# Patient Record
Sex: FEMALE | Race: WHITE | NOT HISPANIC OR LATINO | Employment: OTHER | URBAN - METROPOLITAN AREA
[De-identification: names, ages, dates, MRNs, and addresses within clinical notes are randomized per-mention and may not be internally consistent; named-entity substitution may affect disease eponyms.]

---

## 2017-02-16 ENCOUNTER — OFFICE VISIT (OUTPATIENT)
Dept: FAMILY MEDICINE | Facility: CLINIC | Age: 32
End: 2017-02-16
Payer: COMMERCIAL

## 2017-02-16 VITALS
WEIGHT: 135 LBS | SYSTOLIC BLOOD PRESSURE: 129 MMHG | BODY MASS INDEX: 22.47 KG/M2 | DIASTOLIC BLOOD PRESSURE: 89 MMHG | HEART RATE: 103 BPM | TEMPERATURE: 97.6 F | OXYGEN SATURATION: 98 %

## 2017-02-16 DIAGNOSIS — R07.0 THROAT PAIN: ICD-10-CM

## 2017-02-16 DIAGNOSIS — J01.90 ACUTE SINUSITIS WITH SYMPTOMS > 10 DAYS: Primary | ICD-10-CM

## 2017-02-16 LAB
DEPRECATED S PYO AG THROAT QL EIA: NORMAL
MICRO REPORT STATUS: NORMAL
SPECIMEN SOURCE: NORMAL

## 2017-02-16 PROCEDURE — 87880 STREP A ASSAY W/OPTIC: CPT | Performed by: PHYSICIAN ASSISTANT

## 2017-02-16 PROCEDURE — 87081 CULTURE SCREEN ONLY: CPT | Performed by: PHYSICIAN ASSISTANT

## 2017-02-16 PROCEDURE — 99213 OFFICE O/P EST LOW 20 MIN: CPT | Performed by: PHYSICIAN ASSISTANT

## 2017-02-16 RX ORDER — DOXYCYCLINE 100 MG/1
100 CAPSULE ORAL 2 TIMES DAILY
Qty: 20 CAPSULE | Refills: 0 | Status: SHIPPED | OUTPATIENT
Start: 2017-02-16 | End: 2017-02-26

## 2017-02-16 NOTE — MR AVS SNAPSHOT
After Visit Summary   2/16/2017    Ely Lott    MRN: [id number]           Patient Information     Date Of Birth          1985        Visit Information        Provider Department      2/16/2017 2:20 PM Josiane Mitchell PA-C [facility]        Today's Diagnoses     Acute sinusitis with symptoms > 10 days    -  1    Throat pain          Care Instructions    Doxycycline 100 mg twice a day for 10 days  Increase fluids  Nasal wash  Mucinex DM  F/u if not better after the antibiotic course.     Sinusitis (Antibiotic Treatment)    The sinuses are air-filled spaces within the bones of the face. They connect to the inside of the nose. Sinusitis is an inflammation of the tissue lining the sinus cavity. Sinus inflammation can occur during a cold. It can also be due to allergies to pollens and other particles in the air. Sinusitis can cause symptoms of sinus congestion and fullness. A sinus infection causes fever, headache and facial pain. There is often green or yellow drainage from the nose or into the back of the throat (post-nasal drip). You have been given antibiotics to treat this condition.  Home care:    Take the full course of antibiotics as instructed. Do not stop taking them, even if you feel better.    Drink plenty of water, hot tea, and other liquids. This may help thin mucus. It also may promote sinus drainage.    Heat may help soothe painful areas of the face. Use a towel soaked in hot water. Or,  the shower and direct the hot spray onto your face. Using a vaporizer along with a menthol rub at night may also help.     An expectorant containing guaifenesin may help thin the mucus and promote drainage from the sinuses.    Over-the-counter decongestants may be used unless a similar medicine was prescribed. Nasal sprays work the fastest. Use one that contains phenylephrine or oxymetazoline. First blow the nose gently. Then use the spray. Do not use  these medicines more often than directed on the label or symptoms may get worse. You may also use tablets containing pseudoephedrine. Avoid products that combine ingredients, because side effects may be increased. Read labels. You can also ask the pharmacist for help. (NOTE: Persons with high blood pressure should not use decongestants. They can raise blood pressure.)    Over-the-counter antihistamines may help if allergies contributed to your sinusitis.      Do not use nasal rinses or irrigation during an acute sinus infection, unless told to by your health care provider. Rinsing may spread the infection to other sinuses.    Use acetaminophen or ibuprofen to control pain, unless another pain medicine was prescribed. (If you have chronic liver or kidney disease or ever had a stomach ulcer, talk with your doctor before using these medicines. Aspirin should never be used in anyone under 18 years of age who is ill with a fever. It may cause severe liver damage.)    Don't smoke. This can worsen symptoms.  Follow-up care  Follow up with your healthcare provider or our staff if you are not improving within the next week.  When to seek medical advice  Call your healthcare provider if any of these occur:    Facial pain or headache becoming more severe    Stiff neck    Unusual drowsiness or confusion    Swelling of the forehead or eyelids    Vision problems, including blurred or double vision    Fever of 100.4 F (38 C) or higher, or as directed by your healthcare provider    Seizure    Breathing problems    Symptoms not resolving within 10 days    7631-8228 [employer]. [address] [place] [address]. All rights reserved. This information is not intended as a substitute for professional medical care. Always follow your healthcare professional's instructions.              Follow-ups after your visit        Your next 10 appointments already scheduled     Feb 16, 2017  2:20 PM CST   Office Visit with  "Josiane Mitchell PA-C   [facility] ([facility])    [address] [facility] [address] [phone]           Bring a current list of meds and any records pertaining to this visit.  For Physicals, please bring immunization records and any forms needing to be filled out.  Please arrive 10 minutes early to complete paperwork.              Who to contact     If you have questions or need follow up information about today's clinic visit or your schedule please contact [facility] directly at [phone].  Normal or non-critical lab and imaging results will be communicated to you by MyChart, letter or phone within 4 business days after the clinic has received the results. If you do not hear from us within 7 days, please contact the clinic through [employer] or phone. If you have a critical or abnormal lab result, we will notify you by phone as soon as possible.  Submit refill requests through [email or web address] or call your pharmacy and they will forward the refill request to us. Please allow 3 business days for your refill to be completed.          Additional Information About Your Visit        [employer] Information     [email or web address] lets you send messages to your doctor, view your test results, renew your prescriptions, schedule appointments and more. To sign up, go to [email or web address] . Click on \"Log in\" on the left side of the screen, which will take you to the Welcome page. Then click on \"Sign up Now\" on the right side of the page.     You will be asked to enter the access code listed below, as well as some personal information. Please follow the directions to create your username and password.     Your access code is: [email or web address]  Expires: 2017  2:11 PM     Your access code will  in 90 days. If you need help or a new code, please call your [facility] or [phone].        Care EveryWhere ID     This is your Care " EveryWhere ID. This could be used by other organizations to access your [facility] medical records  [id number]        Your Vitals Were     Pulse Temperature Pulse Oximetry Breastfeeding? BMI (Body Mass Index)       103 97.6  F (36.4  C) (Oral) 98% No 22.47 kg/m2        Blood Pressure from Last 3 Encounters:   02/16/17 129/89   04/18/16 121/73   01/09/15 125/80    Weight from Last 3 Encounters:   02/16/17 135 lb (61.2 kg)   04/18/16 138 lb 12.8 oz (63 kg)   01/09/15 125 lb (56.7 kg)              We Performed the Following     Rapid strep screen          Today's Medication Changes          These changes are accurate as of: 2/16/17  2:11 PM.  If you have any questions, ask your nurse or doctor.               Start taking these medicines.        Dose/Directions    doxycycline Monohydrate 100 MG Caps   Used for:  Acute sinusitis with symptoms > 10 days   Started by:  Josiane Mitchell PA-C        Dose:  100 mg   Take 1 capsule (100 mg) by mouth 2 times daily for 10 days   Quantity:  20 capsule   Refills:  0            Where to get your medicines      These medications were sent to [facility]/pharmacy #[id number] - [place], [place] - [address]  [address] [address]     Phone:  [phone]     doxycycline Monohydrate 100 MG Caps                Primary Care Provider Office Phone # Fax #    Shazia Coleman [phone]       [address] [address]        Thank you!     Thank you for choosing [facility]  for your care. Our goal is always to provide you with excellent care. Hearing back from our patients is one way we can continue to improve our services. Please take a few minutes to complete the written survey that you may receive in the mail after your visit with us. Thank you!             Your Updated Medication List - Protect others around you: Learn how to safely use, store and throw away your  medicines at www.disposemymeds.org.          This list is accurate as of: 2/16/17  2:11 PM.  Always use your most recent med list.                   Brand Name Dispense Instructions for use    doxycycline Monohydrate 100 MG Caps     20 capsule    Take 1 capsule (100 mg) by mouth 2 times daily for 10 days       FLEXERIL PO      Take by mouth At Bedtime       ibuprofen 600 MG tablet    ADVIL/MOTRIN    100 tablet    Take 1 tablet by mouth every 8 hours as needed for pain.

## 2017-02-16 NOTE — PATIENT INSTRUCTIONS
Doxycycline 100 mg twice a day for 10 days  Increase fluids  Nasal wash  Mucinex DM  F/u if not better after the antibiotic course.     Sinusitis (Antibiotic Treatment)    The sinuses are air-filled spaces within the bones of the face. They connect to the inside of the nose. Sinusitis is an inflammation of the tissue lining the sinus cavity. Sinus inflammation can occur during a cold. It can also be due to allergies to pollens and other particles in the air. Sinusitis can cause symptoms of sinus congestion and fullness. A sinus infection causes fever, headache and facial pain. There is often green or yellow drainage from the nose or into the back of the throat (post-nasal drip). You have been given antibiotics to treat this condition.  Home care:    Take the full course of antibiotics as instructed. Do not stop taking them, even if you feel better.    Drink plenty of water, hot tea, and other liquids. This may help thin mucus. It also may promote sinus drainage.    Heat may help soothe painful areas of the face. Use a towel soaked in hot water. Or,  the shower and direct the hot spray onto your face. Using a vaporizer along with a menthol rub at night may also help.     An expectorant containing guaifenesin may help thin the mucus and promote drainage from the sinuses.    Over-the-counter decongestants may be used unless a similar medicine was prescribed. Nasal sprays work the fastest. Use one that contains phenylephrine or oxymetazoline. First blow the nose gently. Then use the spray. Do not use these medicines more often than directed on the label or symptoms may get worse. You may also use tablets containing pseudoephedrine. Avoid products that combine ingredients, because side effects may be increased. Read labels. You can also ask the pharmacist for help. (NOTE: Persons with high blood pressure should not use decongestants. They can raise blood pressure.)    Over-the-counter antihistamines may help if  allergies contributed to your sinusitis.      Do not use nasal rinses or irrigation during an acute sinus infection, unless told to by your health care provider. Rinsing may spread the infection to other sinuses.    Use acetaminophen or ibuprofen to control pain, unless another pain medicine was prescribed. (If you have chronic liver or kidney disease or ever had a stomach ulcer, talk with your doctor before using these medicines. Aspirin should never be used in anyone under 18 years of age who is ill with a fever. It may cause severe liver damage.)    Don't smoke. This can worsen symptoms.  Follow-up care  Follow up with your healthcare provider or our staff if you are not improving within the next week.  When to seek medical advice  Call your healthcare provider if any of these occur:    Facial pain or headache becoming more severe    Stiff neck    Unusual drowsiness or confusion    Swelling of the forehead or eyelids    Vision problems, including blurred or double vision    Fever of 100.4 F (38 C) or higher, or as directed by your healthcare provider    Seizure    Breathing problems    Symptoms not resolving within 10 days    2738-0185 [employer]. [address] [place] [address], [place] [address]. All rights reserved. This information is not intended as a substitute for professional medical care. Always follow your healthcare professional's instructions.

## 2017-02-16 NOTE — NURSING NOTE
"Chief Complaint   Patient presents with     URI     Sore throat       Initial /89 (BP Location: Right arm, Patient Position: Chair, Cuff Size: Adult Regular)  Pulse 103  Temp 97.6  F (36.4  C) (Oral)  Wt 135 lb (61.2 kg)  SpO2 98%  Breastfeeding? No  BMI 22.47 kg/m2 Estimated body mass index is 22.47 kg/(m^2) as calculated from the following:    Height as of 4/18/16: 5' 5\" (1.651 m).    Weight as of this encounter: 135 lb (61.2 kg).  Medication Reconciliation: complete   An,CMA      "

## 2017-02-18 LAB
BACTERIA SPEC CULT: NORMAL
MICRO REPORT STATUS: NORMAL
SPECIMEN SOURCE: NORMAL

## 2017-03-21 ENCOUNTER — OFFICE VISIT (OUTPATIENT)
Dept: FAMILY MEDICINE | Facility: CLINIC | Age: 32
End: 2017-03-21
Payer: COMMERCIAL

## 2017-03-21 VITALS
SYSTOLIC BLOOD PRESSURE: 126 MMHG | DIASTOLIC BLOOD PRESSURE: 76 MMHG | WEIGHT: 136 LBS | HEIGHT: 65 IN | TEMPERATURE: 98.1 F | BODY MASS INDEX: 22.66 KG/M2 | HEART RATE: 106 BPM

## 2017-03-21 DIAGNOSIS — Z32.01 PREGNANCY CONFIRMED BY POSITIVE URINE TEST: Primary | ICD-10-CM

## 2017-03-21 LAB — BETA HCG QUAL IFA URINE: POSITIVE

## 2017-03-21 PROCEDURE — 99213 OFFICE O/P EST LOW 20 MIN: CPT | Performed by: PHYSICIAN ASSISTANT

## 2017-03-21 PROCEDURE — 84703 CHORIONIC GONADOTROPIN ASSAY: CPT | Performed by: PHYSICIAN ASSISTANT

## 2017-03-21 ASSESSMENT — PAIN SCALES - GENERAL: PAINLEVEL: NO PAIN (0)

## 2017-03-21 NOTE — PATIENT INSTRUCTIONS
LMP 2/13/17  EGA 5 weeks 1 day  MYLES 11/21/17    Make appointment with GYN at around 8 weeks of gestation    Pregnancy    Your exam today shows that you are pregnant.  Pregnancy symptoms  During pregnancy your body s hormones change. This causes physical and emotional changes. This is normal. Knowing what to expect is important for your piece of mind and so you know when to seek help for a problem. Here are some of the most common symptoms:    Morning sickness or nausea. This can happen any time of the day or night.    Tender, swollen breasts    Need to urinate frequently    Tiredness or fatigue    Dizziness    Indigestion or heartburn    Food cravings or turn-offs    Constipation    Emotional changes. This can range from anxiety to excitement to depression.  Guidelines for a healthy pregnancy  Here are things you can do to help make sure your baby is born healthy:    Rest when you feel tired. This is especially true in the later months of pregnancy.    Drink more fluids. Your body needs more fluids than you may be used to. Drink 8 to10 glasses of juice, milk, or water every day.    Eat well-balanced meals. Eat at regular times to give your body enough protein. You can expect to gain about 30 pounds during the pregnancy. Don t try to diet or lose weight while you are pregnant.    Take 1 prenatal vitamin every day. This helps give you meet the extra nutritional needs of pregnancy.    Don t take any other medicine during your pregnancy unless your doctor tells you to. This includes prescription medicines and those you buy over the counter. Many drugs can harm the growing baby.    If you have nausea or vomiting, don t eat greasy or fried foods. Eat several smaller meals throughout the day rather than 3 large meals.    If you smoke, you must stop. The nicotine you breathe in goes right to the baby.    Stay away from alcohol, even in moderate amounts. Daily drinking will harm your baby and can cause permanent brain  damage.    Don t use recreational drugs, especially cocaine, crack, and heroin. These will harm your baby. Also avoid marijuana.    If you were using recreational drugs or prescribed medicine when you found out that you were pregnant, talk with your health care provider about possible effects on your growing baby.    If you have medical problems that you need to take medicine for, talk with your doctor about this.  Follow-up care  Call your health care provider to arrange for prenatal care. Prenatal care is important. You can see your family doctor, a pregnancy specialist (obstetrician), or a primary care clinic.  When to seek medical advice  Call your health care provider right away if any of these occur:    Vaginal bleeding    Pain in your belly (abdomen) or back that is moderate or severe    Lots of vomiting, or  you can t keep any fluids down for 6 hours    Burning feeling when you urinate    Headache, dizziness, or rapid weight gain    Fever    Vision changes or blurred vision       2332-5496 [employer]. [address] [place] [address]. All rights reserved. This information is not intended as a substitute for professional medical care. Always follow your healthcare professional's instructions.        Adapting to Pregnancy: First Trimester  As your body adjusts, you may have to change or limit your daily activities. You ll need more rest. You may also need to use the energy you have more wisely.     Eat stomach-friendly foods like cottage cheese, crackers, or bread throughout the day.   Your changing body  Almost every part of your body is affected as you adapt to pregnancy. The uterus and cervix will begin to soften right away. You may not look very pregnant during the first 3 months. But you are likely to have some common signs of early pregnancy:    Nausea    Fatigue    Frequent urination    Mood swings    Bloating of the abdomen    Missed or light periods (first trimester  bleeding)    Nipple or breast tenderness, breast swelling  It s not too late to start good habits  What matters most is protecting your baby from this moment on. If you smoke, drink alcohol, or use drugs, now is the time to stop. If you need help, talk with your healthcare provider.    Smoking increases the risk of  stillbirth or having a low-birth-weight baby. If you smoke, quit now.    Alcohol and drugs have been linked with miscarriage, birth defects, intellectual disability, and low birth weight. Do not drink alcohol or take drugs.  Tips to relieve nausea  Although nausea can happen at any time of the day, it may be worse in the morning. To help prevent nausea:    Eat small, light meals at frequent intervals.    Get up slowly. Eat a few unsalted crackers before you get out of bed.    Avoid smells that bother you.    Avoid spicy and fatty foods.    Eat an ice pop in your favorite flavor.    Get plenty of rest.    Ask your healthcare provider about taking tonya or vitamin B6 for nausea and vomiting.    Talk with your healthcare provider if you take vitamins that upset your stomach.  Work concerns  The end of the first trimester is a good time to discuss working during pregnancy with your employer. Follow your healthcare provider s advice if your job requires you to stand for a long time, work with hazardous tools, or even sit at a desk all day. Your workspace, workload, or scheduled hours may need to be adjusted. Perhaps you can change body postures more often or take an extra break.  Advice for travel  Talk to your healthcare provider first, but the second trimester may be the best time for any travel. You may be advised to avoid certain trips while you re pregnant. Food and water can be concerns in developing countries. Travel by car is a good choice, as you can stop, get out, and stretch. Bring snacks and water along. Fasten the lap belt below your belly, low over your hips. Also be sure to wear the shoulder  harness.  Intimacy  Unless your healthcare provider tells you to, there is no reason to stop having sex while you re pregnant. You or your partner may notice changes in desire. Desire may be less in the first trimester, due to nausea and fatigue. In the second trimester, sex may be very enjoyable. The third trimester can be a challenge comfort-wise. Try different positions and see what s best for you both.    8585-0828 [employer]. [address] [place] [address], [place] [address]. All rights reserved. This information is not intended as a substitute for professional medical care. Always follow your healthcare professional's instructions.        Adapting to Pregnancy: Second Trimester  Keep up the healthy habits you started in your first trimester. You might be a little more tired than normal. So plan your day wisely. Look at the tips below and choose the ones that suit your lifestyle.    If you have any questions, check with your healthcare provider.   If you work  If you can, adjust your work with your employer to fit your needs. Try these tips:    If you stand for long periods, find ways to do some tasks while sitting. Also, try to stand with 1 foot resting on a low stool or ledge. Shift your weight from foot to foot often. Wear low-heeled shoes.    If you sit, keep your knees level with your hips. Rest your feet on a firm surface. Sit tall with support for your low back.    If you work long hours, ask about adjusting your schedule. Try taking shorter breaks more often.  When you travel  The second trimester may be the best time for any travel. Talk to your healthcare provider about any special plans you may need to make. Always:    Wear a seat belt. Fasten the lap part under your belly. Wear the shoulder part also.    Take breaks often during long trips by car or plane. Move around to stretch your legs.    Drink plenty of fluids on flights. The air in plane cabins is very dry.    Avoid hot climates or high  altitudes if you are not used to them.    Avoid places where the food and water might make you sick.    Make sure you are up-to-date on all immunizations, including the flu vaccine. This is especially important when traveling overseas.  Taking time to relax  Find time to rest and relax at work or at home:    Take short time-outs daily. Do relaxation exercises.    Breathe deeply during stressful times.    Try not to take on too much. Plan tasks for times when you have the most energy.    Take naps when you can. Or just sit and relax.    After week 16, avoid lying on your back for more than a few minutes. Instead, lie on your side. Switch sides often.  Continuing as lovers  Unless your healthcare provider tells you otherwise, there is no reason to stop having sex now. Blood supply increases to the pelvic area in the second trimester. Because of this, sex might be more enjoyable. Try different positions and see what s best. Also, talk to your partner about any changes in desire. Spotting may happen after sex. Be sure to let your healthcare provider know if there is heavy bleeding.  Keeping your environment safe  You can still clean house and use scented products. Just take some simple precautions:    Wear gloves when using cleaning fluids.    Open windows to let in fresh air. Use a fan if you paint.    Avoid secondhand smoke.    Don t breathe fumes from nail polish, hair spray, cleansers, or other chemicals.    1723-3988 [employer]. [address] [place] [address], [place] [address]. All rights reserved. This information is not intended as a substitute for professional medical care. Always follow your healthcare professional's instructions.

## 2017-03-21 NOTE — PROGRESS NOTES
"  SUBJECTIVE:                                                    Ely Lott is a 31 year old female who presents to clinic today for the following health issues:    Confirmation of Pregnancy  -LMP : 2/13/17    Patient is feeling well. She has had intermittent cramping and feeling slightly bloated, but otherwise is doing well. No spotting or bleeding.     Problem list and histories reviewed & adjusted, as indicated.  Additional history: as documented    Patient Active Problem List   Diagnosis     Routine general medical examination at a health care facility     ASCUS favor benign     History reviewed. No pertinent past surgical history.    Social History   Substance Use Topics     Smoking status: Never Smoker     Smokeless tobacco: Never Used     Alcohol use Yes      Comment: Occasional     Family History   Problem Relation Age of Onset     DIABETES Father          Current Outpatient Prescriptions   Medication Sig Dispense Refill     Cyclobenzaprine HCl (FLEXERIL PO) Take by mouth At Bedtime       ibuprofen (ADVIL,MOTRIN) 600 MG tablet Take 1 tablet by mouth every 8 hours as needed for pain. (Patient not taking: Reported on 3/21/2017) 100 tablet 3     Allergies   Allergen Reactions     Penicillins        Reviewed and updated as needed this visit by clinical staff  Tobacco  Allergies  Meds  Med Hx  Surg Hx  Fam Hx  Soc Hx      Reviewed and updated as needed this visit by Provider         ROS:  Constitutional, HEENT, cardiovascular, pulmonary, gi and gu systems are negative, except as otherwise noted.    OBJECTIVE:                                                    /76 (BP Location: Left arm, Patient Position: Chair, Cuff Size: Adult Regular)  Pulse 106  Temp 98.1  F (36.7  C) (Oral)  Ht 5' 5\" (1.651 m)  Wt 136 lb (61.7 kg)  LMP 02/13/2017 (Exact Date)  Breastfeeding? No  BMI 22.63 kg/m2  Body mass index is 22.63 kg/(m^2).  GENERAL: healthy, alert and no distress  NECK: no adenopathy, no asymmetry, " masses, or scars and thyroid normal to palpation  RESP: lungs clear to auscultation - no rales, rhonchi or wheezes  CV: regular rate and rhythm, normal S1 S2, no S3 or S4, no murmur, click or rub, no peripheral edema and peripheral pulses strong  ABDOMEN: soft, nontender, no hepatosplenomegaly, no masses and bowel sounds normal  MS: no gross musculoskeletal defects noted, no edema    Diagnostic Test Results:  Results for orders placed or performed in visit on 03/21/17 (from the past 24 hour(s))   Beta HCG qual IFA urine   Result Value Ref Range    Beta HCG Qual IFA Urine Positive (A) NEG        ASSESSMENT/PLAN:                                                        ICD-10-CM    1. Amenorrhea N91.2 Beta HCG qual IFA urine   2. Pregnancy confirmed by positive urine test Z32.01 OB/GYN REFERRAL     LMP 2/13/17  EGA 5 weeks 1 day  MYLES 11/21/17    Make appointment with GYN at around 8 weeks of gestation  Josiane Mitchell PA-C  [facility]

## 2017-03-21 NOTE — MR AVS SNAPSHOT
After Visit Summary   3/21/2017    Ely Lott    MRN: [id number]           Patient Information     Date Of Birth          1985        Visit Information        Provider Department      3/21/2017 7:00 AM Josiane Mitchell PA-C [facility]        Today's Diagnoses     Amenorrhea    -  1    Pregnancy confirmed by positive urine test          Care Instructions    LMP 2/13/17  EGA 5 weeks 1 day  MYLES 11/21/17    Make appointment with GYN at around 8 weeks of gestation    Pregnancy    Your exam today shows that you are pregnant.  Pregnancy symptoms  During pregnancy your body s hormones change. This causes physical and emotional changes. This is normal. Knowing what to expect is important for your piece of mind and so you know when to seek help for a problem. Here are some of the most common symptoms:    Morning sickness or nausea. This can happen any time of the day or night.    Tender, swollen breasts    Need to urinate frequently    Tiredness or fatigue    Dizziness    Indigestion or heartburn    Food cravings or turn-offs    Constipation    Emotional changes. This can range from anxiety to excitement to depression.  Guidelines for a healthy pregnancy  Here are things you can do to help make sure your baby is born healthy:    Rest when you feel tired. This is especially true in the later months of pregnancy.    Drink more fluids. Your body needs more fluids than you may be used to. Drink 8 to10 glasses of juice, milk, or water every day.    Eat well-balanced meals. Eat at regular times to give your body enough protein. You can expect to gain about 30 pounds during the pregnancy. Don t try to diet or lose weight while you are pregnant.    Take 1 prenatal vitamin every day. This helps give you meet the extra nutritional needs of pregnancy.    Don t take any other medicine during your pregnancy unless your doctor tells you to. This includes prescription medicines and  those you buy over the counter. Many drugs can harm the growing baby.    If you have nausea or vomiting, don t eat greasy or fried foods. Eat several smaller meals throughout the day rather than 3 large meals.    If you smoke, you must stop. The nicotine you breathe in goes right to the baby.    Stay away from alcohol, even in moderate amounts. Daily drinking will harm your baby and can cause permanent brain damage.    Don t use recreational drugs, especially cocaine, crack, and heroin. These will harm your baby. Also avoid marijuana.    If you were using recreational drugs or prescribed medicine when you found out that you were pregnant, talk with your health care provider about possible effects on your growing baby.    If you have medical problems that you need to take medicine for, talk with your doctor about this.  Follow-up care  Call your health care provider to arrange for prenatal care. Prenatal care is important. You can see your family doctor, a pregnancy specialist (obstetrician), or a primary care clinic.  When to seek medical advice  Call your health care provider right away if any of these occur:    Vaginal bleeding    Pain in your belly (abdomen) or back that is moderate or severe    Lots of vomiting, or  you can t keep any fluids down for 6 hours    Burning feeling when you urinate    Headache, dizziness, or rapid weight gain    Fever    Vision changes or blurred vision       6129-4867 [employer]. [address] [place] [address], [place] [address]. All rights reserved. This information is not intended as a substitute for professional medical care. Always follow your healthcare professional's instructions.        Adapting to Pregnancy: First Trimester  As your body adjusts, you may have to change or limit your daily activities. You ll need more rest. You may also need to use the energy you have more wisely.     Eat stomach-friendly foods like cottage cheese, crackers, or bread throughout the  day.   Your changing body  Almost every part of your body is affected as you adapt to pregnancy. The uterus and cervix will begin to soften right away. You may not look very pregnant during the first 3 months. But you are likely to have some common signs of early pregnancy:    Nausea    Fatigue    Frequent urination    Mood swings    Bloating of the abdomen    Missed or light periods (first trimester bleeding)    Nipple or breast tenderness, breast swelling  It s not too late to start good habits  What matters most is protecting your baby from this moment on. If you smoke, drink alcohol, or use drugs, now is the time to stop. If you need help, talk with your healthcare provider.    Smoking increases the risk of  stillbirth or having a low-birth-weight baby. If you smoke, quit now.    Alcohol and drugs have been linked with miscarriage, birth defects, intellectual disability, and low birth weight. Do not drink alcohol or take drugs.  Tips to relieve nausea  Although nausea can happen at any time of the day, it may be worse in the morning. To help prevent nausea:    Eat small, light meals at frequent intervals.    Get up slowly. Eat a few unsalted crackers before you get out of bed.    Avoid smells that bother you.    Avoid spicy and fatty foods.    Eat an ice pop in your favorite flavor.    Get plenty of rest.    Ask your healthcare provider about taking tonya or vitamin B6 for nausea and vomiting.    Talk with your healthcare provider if you take vitamins that upset your stomach.  Work concerns  The end of the first trimester is a good time to discuss working during pregnancy with your employer. Follow your healthcare provider s advice if your job requires you to stand for a long time, work with hazardous tools, or even sit at a desk all day. Your workspace, workload, or scheduled hours may need to be adjusted. Perhaps you can change body postures more often or take an extra break.  Advice for travel  Talk to your  healthcare provider first, but the second trimester may be the best time for any travel. You may be advised to avoid certain trips while you re pregnant. Food and water can be concerns in developing countries. Travel by car is a good choice, as you can stop, get out, and stretch. Bring snacks and water along. Fasten the lap belt below your belly, low over your hips. Also be sure to wear the shoulder harness.  Intimacy  Unless your healthcare provider tells you to, there is no reason to stop having sex while you re pregnant. You or your partner may notice changes in desire. Desire may be less in the first trimester, due to nausea and fatigue. In the second trimester, sex may be very enjoyable. The third trimester can be a challenge comfort-wise. Try different positions and see what s best for you both.    2277-9697 [employer]. [address] [place] [address]. All rights reserved. This information is not intended as a substitute for professional medical care. Always follow your healthcare professional's instructions.        Adapting to Pregnancy: Second Trimester  Keep up the healthy habits you started in your first trimester. You might be a little more tired than normal. So plan your day wisely. Look at the tips below and choose the ones that suit your lifestyle.    If you have any questions, check with your healthcare provider.   If you work  If you can, adjust your work with your employer to fit your needs. Try these tips:    If you stand for long periods, find ways to do some tasks while sitting. Also, try to stand with 1 foot resting on a low stool or ledge. Shift your weight from foot to foot often. Wear low-heeled shoes.    If you sit, keep your knees level with your hips. Rest your feet on a firm surface. Sit tall with support for your low back.    If you work long hours, ask about adjusting your schedule. Try taking shorter breaks more often.  When you travel  The second trimester may  be the best time for any travel. Talk to your healthcare provider about any special plans you may need to make. Always:    Wear a seat belt. Fasten the lap part under your belly. Wear the shoulder part also.    Take breaks often during long trips by car or plane. Move around to stretch your legs.    Drink plenty of fluids on flights. The air in plane cabins is very dry.    Avoid hot climates or high altitudes if you are not used to them.    Avoid places where the food and water might make you sick.    Make sure you are up-to-date on all immunizations, including the flu vaccine. This is especially important when traveling overseas.  Taking time to relax  Find time to rest and relax at work or at home:    Take short time-outs daily. Do relaxation exercises.    Breathe deeply during stressful times.    Try not to take on too much. Plan tasks for times when you have the most energy.    Take naps when you can. Or just sit and relax.    After week 16, avoid lying on your back for more than a few minutes. Instead, lie on your side. Switch sides often.  Continuing as lovers  Unless your healthcare provider tells you otherwise, there is no reason to stop having sex now. Blood supply increases to the pelvic area in the second trimester. Because of this, sex might be more enjoyable. Try different positions and see what s best. Also, talk to your partner about any changes in desire. Spotting may happen after sex. Be sure to let your healthcare provider know if there is heavy bleeding.  Keeping your environment safe  You can still clean house and use scented products. Just take some simple precautions:    Wear gloves when using cleaning fluids.    Open windows to let in fresh air. Use a fan if you paint.    Avoid secondhand smoke.    Don t breathe fumes from nail polish, hair spray, cleansers, or other chemicals.    2884-7507 [employer]. [address] [place] [address], [place] [address]. All rights reserved. This  "information is not intended as a substitute for professional medical care. Always follow your healthcare professional's instructions.              Follow-ups after your visit        Additional Services     OB/GYN REFERRAL       Your provider has referred you to:  FMDARY: [facility] [phone]   [email or web address]    Please be aware that coverage of these services is subject to the terms and limitations of your health insurance plan.  Call member services at your health plan with any benefit or coverage questions.      Please bring the following with you to your appointment:    (1) Any X-Rays, CTs or MRIs which have been performed.  Contact the facility where they were done to arrange for  prior to your scheduled appointment.   (2) List of current medications   (3) This referral request   (4) Any documents/labs given to you for this referral                  Who to contact     If you have questions or need follow up information about today's clinic visit or your schedule please contact [facility] directly at [phone].  Normal or non-critical lab and imaging results will be communicated to you by MyChart, letter or phone within 4 business days after the clinic has received the results. If you do not hear from us within 7 days, please contact the clinic through [employer] or phone. If you have a critical or abnormal lab result, we will notify you by phone as soon as possible.  Submit refill requests through [employer] or call your pharmacy and they will forward the refill request to us. Please allow 3 business days for your refill to be completed.          Additional Information About Your Visit        [employer] [email or web address] Information     [employer] lets you send messages to your doctor, view your test results, renew your prescriptions, schedule appointments and more. To sign up, go to [email or web address] . Click on \"Log in\" on the left side " "of the screen, which will take you to the Welcome page. Then click on \"Sign up Now\" on the right side of the page.     You will be asked to enter the access code listed below, as well as some personal information. Please follow the directions to create your username and password.     Your access code is: [email or web address]  Expires: 2017  3:11 PM     Your access code will  in 90 days. If you need help or a new code, please call your [facility] or [phone].        Care EveryWhere ID     This is your Care EveryWhere ID. This could be used by other organizations to access your [place] medical records  [id number]        Your Vitals Were     Pulse Temperature Height Last Period Breastfeeding? BMI (Body Mass Index)    106 98.1  F (36.7  C) (Oral) 5' 5\" (1.651 m) 2017 (Exact Date) No 22.63 kg/m2       Blood Pressure from Last 3 Encounters:   17 126/76   17 129/89   16 121/73    Weight from Last 3 Encounters:   17 136 lb (61.7 kg)   17 135 lb (61.2 kg)   16 138 lb 12.8 oz (63 kg)              We Performed the Following     Beta HCG qual IFA urine     OB/GYN REFERRAL        Primary Care Provider Office Phone # Fax #    Shazia Coleman [phone]       [address] [address]        Thank you!     Thank you for choosing [facility]  for your care. Our goal is always to provide you with excellent care. Hearing back from our patients is one way we can continue to improve our services. Please take a few minutes to complete the written survey that you may receive in the mail after your visit with us. Thank you!             Your Updated Medication List - Protect others around you: Learn how to safely use, store and throw away your medicines at www.disposemymeds.org.          This list is accurate as of: 3/21/17  8:07 AM.  Always use your most recent med list.                   Brand " Name Dispense Instructions for use    FLEXERIL PO      Take by mouth At Bedtime       ibuprofen 600 MG tablet    ADVIL/MOTRIN    100 tablet    Take 1 tablet by mouth every 8 hours as needed for pain.

## 2017-03-21 NOTE — NURSING NOTE
"Chief Complaint   Patient presents with     Confirmation Of Pregnancy       Initial /76 (BP Location: Left arm, Patient Position: Chair, Cuff Size: Adult Regular)  Pulse 106  Temp 98.1  F (36.7  C) (Oral)  Ht 5' 5\" (1.651 m)  Wt 136 lb (61.7 kg)  LMP 02/13/2017 (Exact Date)  Breastfeeding? No  BMI 22.63 kg/m2 Estimated body mass index is 22.63 kg/(m^2) as calculated from the following:    Height as of this encounter: 5' 5\" (1.651 m).    Weight as of this encounter: 136 lb (61.7 kg).  Medication Reconciliation: complete     Josee Mae MA       "

## 2017-04-12 ENCOUNTER — PRENATAL OFFICE VISIT (OUTPATIENT)
Dept: OBGYN | Facility: CLINIC | Age: 32
End: 2017-04-12
Payer: COMMERCIAL

## 2017-04-12 VITALS
DIASTOLIC BLOOD PRESSURE: 81 MMHG | WEIGHT: 135 LBS | BODY MASS INDEX: 22.47 KG/M2 | HEART RATE: 83 BPM | TEMPERATURE: 97.8 F | SYSTOLIC BLOOD PRESSURE: 118 MMHG

## 2017-04-12 DIAGNOSIS — Z34.01 ENCOUNTER FOR SUPERVISION OF NORMAL FIRST PREGNANCY IN FIRST TRIMESTER: Primary | ICD-10-CM

## 2017-04-12 LAB
ERYTHROCYTE [DISTWIDTH] IN BLOOD BY AUTOMATED COUNT: 12.1 % (ref 10–15)
HCT VFR BLD AUTO: 39.9 % (ref 35–47)
HGB BLD-MCNC: 13.9 G/DL (ref 11.7–15.7)
MCH RBC QN AUTO: 31.7 PG (ref 26.5–33)
MCHC RBC AUTO-ENTMCNC: 34.8 G/DL (ref 31.5–36.5)
MCV RBC AUTO: 91 FL (ref 78–100)
PLATELET # BLD AUTO: 222 10E9/L (ref 150–450)
RBC # BLD AUTO: 4.38 10E12/L (ref 3.8–5.2)
WBC # BLD AUTO: 10.1 10E9/L (ref 4–11)

## 2017-04-12 PROCEDURE — 87086 URINE CULTURE/COLONY COUNT: CPT | Performed by: OBSTETRICS & GYNECOLOGY

## 2017-04-12 PROCEDURE — 85027 COMPLETE CBC AUTOMATED: CPT | Performed by: OBSTETRICS & GYNECOLOGY

## 2017-04-12 PROCEDURE — 86850 RBC ANTIBODY SCREEN: CPT | Performed by: OBSTETRICS & GYNECOLOGY

## 2017-04-12 PROCEDURE — 86900 BLOOD TYPING SEROLOGIC ABO: CPT | Performed by: OBSTETRICS & GYNECOLOGY

## 2017-04-12 PROCEDURE — 87389 HIV-1 AG W/HIV-1&-2 AB AG IA: CPT | Performed by: OBSTETRICS & GYNECOLOGY

## 2017-04-12 PROCEDURE — 86901 BLOOD TYPING SEROLOGIC RH(D): CPT | Performed by: OBSTETRICS & GYNECOLOGY

## 2017-04-12 PROCEDURE — 86780 TREPONEMA PALLIDUM: CPT | Performed by: OBSTETRICS & GYNECOLOGY

## 2017-04-12 PROCEDURE — 99207 ZZC FIRST OB VISIT: CPT | Performed by: OBSTETRICS & GYNECOLOGY

## 2017-04-12 PROCEDURE — 86762 RUBELLA ANTIBODY: CPT | Performed by: OBSTETRICS & GYNECOLOGY

## 2017-04-12 PROCEDURE — 87340 HEPATITIS B SURFACE AG IA: CPT | Performed by: OBSTETRICS & GYNECOLOGY

## 2017-04-12 PROCEDURE — 36415 COLL VENOUS BLD VENIPUNCTURE: CPT | Performed by: OBSTETRICS & GYNECOLOGY

## 2017-04-12 RX ORDER — PRENATAL VIT/IRON FUM/FOLIC AC 27MG-0.8MG
1 TABLET ORAL DAILY
COMMUNITY
End: 2023-03-29

## 2017-04-12 NOTE — MR AVS SNAPSHOT
After Visit Summary   4/12/2017    Ely Lott    MRN: [id number]           Patient Information     Date Of Birth          1985        Visit Information        Provider Department      4/12/2017 4:45 PM Kelvin Waddell MD [facility]        Care Instructions                                                        If you have any questions regarding your visit, Please contact your care team.     [employer] Access Services: [phone]    [facility] CLINIC HOURS TELEPHONE NUMBER   Kelvin Waddell M.D.      [place]-    Lakisha Gusman-MARIAN Wills-Medical Assistant   Monday-[place]  8:00a.m-4:45 p.m  Wednesday-[place] 8:00a.m-4:45 p.m.  Thursday-[place]  8:00a.m-4:45 p.m.  Friday-[place]  8:00a.m-4:45 p.m. [facility]  [address] [address] [place] [address] [place] [address]  [phone] ask [facility]  [phone] Fax  Imaging [phone]    [facility] Labor and Delivery  [address] [place] [address] Dr.  [place], [place] [address] [phone]    [facility]  [address] [place] [address]  [phone] [facility]  [phone] Fax  Imaging [phone]     Urgent Care locations:    [facility] Monday-Friday  5 pm - 9 pm  Saturday and Sunday   9 am - 5 pm    Monday-Friday   11 am - 9 pm  Saturday and Sunday   9 am - 5 pm   [phone]       If you need a medication refill, please contact your pharmacy. Please allow 3 business days for your refill to be completed.  As always, Thank you for trusting us with your healthcare needs!          Follow-ups after your visit        Who to contact     If you have questions or need follow up information about today's clinic visit or your schedule please contact [facility] directly at [phone].  Normal or non-critical lab and imaging results will be communicated to you by  "MyChart, letter or phone within 4 business days after the clinic has received the results. If you do not hear from us within 7 days, please contact the clinic through [employer] or phone. If you have a critical or abnormal lab result, we will notify you by phone as soon as possible.  Submit refill requests through [employer] or call your pharmacy and they will forward the refill request to us. Please allow 3 business days for your refill to be completed.          Additional Information About Your Visit        [employer] Information     [employer] lets you send messages to your doctor, view your test results, renew your prescriptions, schedule appointments and more. To sign up, go to [email or web address] . Click on \"Log in\" on the left side of the screen, which will take you to the Welcome page. Then click on \"Sign up Now\" on the right side of the page.     You will be asked to enter the access code listed below, as well as some personal information. Please follow the directions to create your username and password.     Your access code is: [email or web address]  Expires: 2017  3:11 PM     Your access code will  in 90 days. If you need help or a new code, please call your [place] clinic or [phone].        Care EveryWhere ID     This is your Care EveryWhere ID. This could be used by other organizations to access your [place] medical records  [id number]        Your Vitals Were     Pulse Temperature Last Period Breastfeeding? BMI (Body Mass Index)       83 97.8  F (36.6  C) (Oral) 2017 (Exact Date) No 22.47 kg/m2        Blood Pressure from Last 3 Encounters:   17 118/81   17 126/76   17 129/89    Weight from Last 3 Encounters:   17 61.2 kg (135 lb)   17 61.7 kg (136 lb)   17 61.2 kg (135 lb)              Today, you had the following     No orders found for display         Today's Medication Changes          These changes are accurate as of: 17  4:50 PM.  If you have any " questions, ask your nurse or doctor.               Stop taking these medicines if you haven't already. Please contact your care team if you have questions.     FLEXERIL PO   Stopped by:  Kelvin Waddell MD           ibuprofen 600 MG tablet   Commonly known as:  ADVIL/MOTRIN   Stopped by:  Kelvin Waddell MD                    Primary Care Provider Office Phone # Fax #    Shazia ENEDINA Coleman [phone]       [address] [address]        Thank you!     Thank you for choosing [facility]  for your care. Our goal is always to provide you with excellent care. Hearing back from our patients is one way we can continue to improve our services. Please take a few minutes to complete the written survey that you may receive in the mail after your visit with us. Thank you!             Your Updated Medication List - Protect others around you: Learn how to safely use, store and throw away your medicines at www.disposemymeds.org.          This list is accurate as of: 4/12/17  4:50 PM.  Always use your most recent med list.                   Brand Name Dispense Instructions for use    prenatal multivitamin  plus iron 27-0.8 MG Tabs per tablet      Take 1 tablet by mouth daily

## 2017-04-12 NOTE — PATIENT INSTRUCTIONS
If you have any questions regarding your visit, Please contact your care team.     [employer] [facility] Services: [phone]    [facility] CLINIC HOURS TELEPHONE NUMBER   DON [place]-    Lakisha Gusman-MARIAN Wills-Medical Assistant   Monday-[place]  8:00a.m-4:45 p.m  Wednesday-[place] 8:00a.m-4:45 p.m.  Thursday-[place]  8:00a.m-4:45 p.m.  Friday-[place]  8:00a.m-4:45 p.m. [facility]  [address]  [place], [place] [address] [phone] ask [facility]  [phone] Fax  Imaging [phone]    [facility] Labor and Delivery  [address] [place] [address] Dr.  [place], [place] [address] [phone]    [facility]  [address] [place] [address]  [phone] ask [facility]  [phone] Fax  Imaging [phone]     Urgent Care locations:    [place]        [place] Monday-Friday  5 pm - 9 pm  Saturday and Sunday   9 am - 5 pm    Monday-Friday   11 am - 9 pm  Saturday and Sunday   9 am - 5 pm   [phone]       If you need a medication refill, please contact your pharmacy. Please allow 3 business days for your refill to be completed.  As always, Thank you for trusting us with your healthcare needs!

## 2017-04-12 NOTE — NURSING NOTE
"Chief Complaint   Patient presents with     Prenatal Care     1st initial OB visit       Initial /81 (BP Location: Left arm, Patient Position: Chair, Cuff Size: Adult Regular)  Pulse 83  Temp 97.8  F (36.6  C) (Oral)  Wt 61.2 kg (135 lb)  LMP 02/13/2017 (Exact Date)  Breastfeeding? No  BMI 22.47 kg/m2 Estimated body mass index is 22.47 kg/(m^2) as calculated from the following:    Height as of 3/21/17: 1.651 m (5' 5\").    Weight as of this encounter: 61.2 kg (135 lb).  Medication Reconciliation: complete   Johann Beasley CMA      "

## 2017-04-13 LAB
ABO + RH BLD: NORMAL
ABO + RH BLD: NORMAL
BLD GP AB SCN SERPL QL: NORMAL
BLOOD BANK CMNT PATIENT-IMP: NORMAL
HBV SURFACE AG SERPL QL IA: NONREACTIVE
HIV 1+2 AB+HIV1 P24 AG SERPL QL IA: NORMAL
RUBV IGG SERPL IA-ACNC: 44 IU/ML
SPECIMEN EXP DATE BLD: NORMAL
T PALLIDUM IGG+IGM SER QL: NEGATIVE

## 2017-04-14 ENCOUNTER — RADIANT APPOINTMENT (OUTPATIENT)
Dept: ULTRASOUND IMAGING | Facility: CLINIC | Age: 32
End: 2017-04-14
Attending: OBSTETRICS & GYNECOLOGY
Payer: COMMERCIAL

## 2017-04-14 DIAGNOSIS — Z34.01 ENCOUNTER FOR SUPERVISION OF NORMAL FIRST PREGNANCY IN FIRST TRIMESTER: ICD-10-CM

## 2017-04-14 LAB
BACTERIA SPEC CULT: NORMAL
MICRO REPORT STATUS: NORMAL
SPECIMEN SOURCE: NORMAL

## 2017-04-14 PROCEDURE — 76801 OB US < 14 WKS SINGLE FETUS: CPT

## 2017-04-15 PROBLEM — Z34.00 SUPERVISION OF NORMAL FIRST PREGNANCY: Status: ACTIVE | Noted: 2017-04-15

## 2017-04-16 NOTE — PROGRESS NOTES
"\"Lizbeth\" Ely Lott is a 31 year old year old G 1 P 0 who presents for an initial obstetrical visit.  Referred by self.    Currently experiencing normal pregnancy symptoms without particular problems including pain, bleeding, marked vomiting or weight loss except: no exceptions.  This was a semi-planned pregnancy. Menses reg.   Here today with .  Additional concerns: poor sleep, chronic neck pain with chiropractic treatment.     ROS:     Systems reviewed include constitutional; breast;                 cardiac; respiratory; gastrointestinal; genitourinary;                                musculoskeletal; integumentary; psychological;                                hematologic/lymphatic and endocrine.                  These systems were negative for significant symptoms except                 for the following: none and see above HPI.    Past medical, obstetrical, surgical, family and social history reviewed and as noted or updated in chart.     Allergies, meds and supplements are as noted or updated in chart.                    Episode OB dating, demographic and history forms completed or reviewed.    EXAM:  VS as noted. BMI- Body mass index is 22.47 kg/(m^2).    Relatively recent normal general exam- not repeated now.       ASSESSMENT: (Z34.01) Encounter for supervision of normal first pregnancy in first trimester  (primary encounter diagnosis)  Comment:   Plan: CBC WITH PLATELETS, HIV Antigen Antibody Combo,        Rubella Antibody IgG Quantitative, Hepatitis B         surface antigen, Anti Treponema, ABO/RH TYPE         AND SCREEN, Urine Culture Aerobic Bacterial, US        OB < 14 Weeks Single               PLAN:  Anticipatory guidance as noted. Symptoms, problems and concerns reviewed. Recommended weight gain discussed. Problem list initiated. Check u/s now. Pap due in 2 yrs. Orders as noted. RTC in 4 weeks. Discuss special screening tests next.    Kelvin Waddell MD        "

## 2017-05-11 ENCOUNTER — PRENATAL OFFICE VISIT (OUTPATIENT)
Dept: OBGYN | Facility: CLINIC | Age: 32
End: 2017-05-11
Payer: COMMERCIAL

## 2017-05-11 VITALS
HEART RATE: 67 BPM | DIASTOLIC BLOOD PRESSURE: 78 MMHG | BODY MASS INDEX: 22.8 KG/M2 | WEIGHT: 137 LBS | SYSTOLIC BLOOD PRESSURE: 109 MMHG | TEMPERATURE: 96.7 F

## 2017-05-11 DIAGNOSIS — Z34.02 ENCOUNTER FOR SUPERVISION OF NORMAL FIRST PREGNANCY IN SECOND TRIMESTER: ICD-10-CM

## 2017-05-11 PROCEDURE — 99207 ZZC PRENATAL VISIT: CPT | Performed by: OBSTETRICS & GYNECOLOGY

## 2017-05-11 NOTE — PATIENT INSTRUCTIONS
If you have any questions regarding your visit, Please contact your care team.     [facility] Access [facility]: [phone]    [facility] CLINIC HOURS TELEPHONE NUMBER   DON [place]-    Lakisha Gusman-MARIAN Wills-Medical Assistant   Monday-[place]  8:00a.m-4:45 p.m  Wednesday-[place] 8:00a.m-4:45 p.m.  Thursday-[place]  8:00a.m-4:45 p.m.  Friday-[place]  8:00a.m-4:45 p.m. [facility]  [address]  [place], [place] [address] [phone] ask [facility]  [phone] Fax  Imaging [phone]    [facility] Labor and Delivery  [address] [place] [address] Dr.  [place], [place] [address] [phone]    [facility]  [address] [place] [address]  [phone] ask [facility]  [phone] Fax  Imaging [phone]     Urgent Care locations:    [place]        [place] Monday-Friday  5 pm - 9 pm  Saturday and Sunday   9 am - 5 pm    Monday-Friday   11 am - 9 pm  Saturday and Sunday   9 am - 5 pm   [phone]       If you need a medication refill, please contact your pharmacy. Please allow 3 business days for your refill to be completed.  As always, Thank you for trusting us with your healthcare needs!

## 2017-05-11 NOTE — PROGRESS NOTES
No signif signs, symptoms or concerns. Here with . Advice appropriate to gestational age reviewed including pertinent Checklist items. Discussed condition, tests and care plan including RBA. Problem list updated. Discuss special screening tests next.  A/P:  Ely was seen today for prenatal care.    Diagnoses and all orders for this visit:    Encounter for supervision of normal first pregnancy in second trimester        Kelvin Waddell MD

## 2017-05-11 NOTE — MR AVS SNAPSHOT
After Visit Summary   5/11/2017    Ely Lott    MRN: [id number]           Patient Information     Date Of Birth          1985        Visit Information        Provider Department      5/11/2017 4:15 PM Kelvin Waddell MD [facility]        Today's Diagnoses     Encounter for supervision of normal first pregnancy in second trimester          Care Instructions                                                        If you have any questions regarding your visit, Please contact your care team.     [facility] Access Services: [phone]    [facility] CLINIC HOURS TELEPHONE NUMBER   Kelvin Waddell M.D.      [place]-    Lakisha Gusman-MARIAN Wills-Medical Assistant   Monday-[place]  8:00a.m-4:45 p.m  Wednesday-[place] 8:00a.m-4:45 p.m.  Thursday-[place]  8:00a.m-4:45 p.m.  Friday-[place]  8:00a.m-4:45 p.m. [facility]  [address] [place] [address]. [place] [address]  [phone] ask for [facility]  [phone] Fax  Imaging [phone]    [facility] Labor and Delivery  [address]  [place], [place] [address] [phone]    [facility]  [address] [place] [address]  [phone] ask [facility]  [phone] Fax  Imaging [phone]     Urgent Care locations:    [facility] Monday-Friday  5 pm - 9 pm  Saturday and Sunday   9 am - 5 pm    Monday-Friday   11 am - 9 pm  Saturday and Sunday   9 am - 5 pm   [phone]       If you need a medication refill, please contact your pharmacy. Please allow 3 business days for your refill to be completed.  As always, Thank you for trusting us with your healthcare needs!          Follow-ups after your visit        Who to contact     If you have questions or need follow up information about today's clinic visit or your schedule please contact [facility] directly  "at [phone].  Normal or non-critical lab and imaging results will be communicated to you by MyChart, letter or phone within 4 business days after the clinic has received the results. If you do not hear from us within 7 days, please contact the clinic through Brandtonehart or phone. If you have a critical or abnormal lab result, we will notify you by phone as soon as possible.  Submit refill requests through PlayMob or call your pharmacy and they will forward the refill request to us. Please allow 3 business days for your refill to be completed.          Additional Information About Your Visit        Brandtonehart Information     PlayMob lets you send messages to your doctor, view your test results, renew your prescriptions, schedule appointments and more. To sign up, go to [email or web address] . Click on \"Log in\" on the left side of the screen, which will take you to the Welcome page. Then click on \"Sign up Now\" on the right side of the page.     You will be asked to enter the access code listed below, as well as some personal information. Please follow the directions to create your username and password.     Your access code is: [email or web address]  Expires: 2017  3:11 PM     Your access code will  in 90 days. If you need help or a new code, please call your [place] clinic or [phone].        Care EveryWhere ID     This is your Care EveryWhere ID. This could be used by other organizations to access your [place] medical records  [id number]        Your Vitals Were     Pulse Temperature Last Period Breastfeeding? BMI (Body Mass Index)       67 96.7  F (35.9  C) (Oral) 2017 (Exact Date) No 22.8 kg/m2        Blood Pressure from Last 3 Encounters:   17 109/78   17 118/81   17 126/76    Weight from Last 3 Encounters:   17 137 lb (62.1 kg)   17 135 lb (61.2 kg)   17 136 lb (61.7 kg)              Today, you had the following     No orders found for display       Primary Care " Provider Office Phone # Fax #    Shazia Coleman [phone]       [address] [address]        Thank you!     Thank you for choosing [facility]  for your care. Our goal is always to provide you with excellent care. Hearing back from our patients is one way we can continue to improve our services. Please take a few minutes to complete the written survey that you may receive in the mail after your visit with us. Thank you!             Your Updated Medication List - Protect others around you: Learn how to safely use, store and throw away your medicines at www.disposemymeds.org.          This list is accurate as of: 5/11/17  4:33 PM.  Always use your most recent med list.                   Brand Name Dispense Instructions for use    prenatal multivitamin  plus iron 27-0.8 MG Tabs per tablet      Take 1 tablet by mouth daily

## 2017-06-15 ENCOUNTER — PRENATAL OFFICE VISIT (OUTPATIENT)
Dept: OBGYN | Facility: CLINIC | Age: 32
End: 2017-06-15
Payer: COMMERCIAL

## 2017-06-15 VITALS
SYSTOLIC BLOOD PRESSURE: 113 MMHG | BODY MASS INDEX: 23.46 KG/M2 | WEIGHT: 141 LBS | TEMPERATURE: 97 F | HEART RATE: 80 BPM | DIASTOLIC BLOOD PRESSURE: 78 MMHG

## 2017-06-15 DIAGNOSIS — Z34.02 ENCOUNTER FOR SUPERVISION OF NORMAL FIRST PREGNANCY IN SECOND TRIMESTER: ICD-10-CM

## 2017-06-15 PROCEDURE — 36415 COLL VENOUS BLD VENIPUNCTURE: CPT | Performed by: OBSTETRICS & GYNECOLOGY

## 2017-06-15 PROCEDURE — 81511 FTL CGEN ABNOR FOUR ANAL: CPT | Mod: 90 | Performed by: OBSTETRICS & GYNECOLOGY

## 2017-06-15 PROCEDURE — 99000 SPECIMEN HANDLING OFFICE-LAB: CPT | Performed by: OBSTETRICS & GYNECOLOGY

## 2017-06-15 PROCEDURE — 99207 ZZC PRENATAL VISIT: CPT | Performed by: OBSTETRICS & GYNECOLOGY

## 2017-06-15 NOTE — MR AVS SNAPSHOT
After Visit Summary   6/15/2017    Ely Lott    MRN: [id number]           Patient Information     Date Of Birth          1985        Visit Information        Provider Department      6/15/2017 4:30 PM Kelvin Waddell MD [facility]        Today's Diagnoses     Encounter for supervision of normal first pregnancy in second trimester          Care Instructions                                                        If you have any questions regarding your visit, Please contact your care team.     [facility] Access Services: [phone]    [facility] CLINIC HOURS TELEPHONE NUMBER   Kelvin Waddell M.D.      [place]-    Lakisha Gusman-MARIAN Wills-Medical Assistant   Monday-[place]  8:00a.m-4:45 p.m  Wednesday-[place] 8:00a.m-4:45 p.m.  Thursday-[place]  8:00a.m-4:45 p.m.  Friday-[place]  8:00a.m-4:45 p.m. [facility]  [address] [place] [address]. [place] [address]  [phone] ask for [facility]  [phone] Fax  Imaging [phone]    [facility] Labor and Delivery  [address] [place] [address]  [phone]    [facility]  [address] [place] [address]  [phone] ask [facility]  [phone] Fax  Imaging [phone]     Urgent Care locations:    [facility] Monday-Friday  5 pm - 9 pm  Saturday and Sunday   9 am - 5 pm    Monday-Friday   11 am - 9 pm  Saturday and Sunday   9 am - 5 pm   [phone]       If you need a medication refill, please contact your pharmacy. Please allow 3 business days for your refill to be completed.  As always, Thank you for trusting us with your healthcare needs!            Follow-ups after your visit        Who to contact     If you have questions or need follow up information about today's clinic visit or your schedule please contact [facility]  "directly at [phone].  Normal or non-critical lab and imaging results will be communicated to you by Qualtricshart, letter or phone within 4 business days after the clinic has received the results. If you do not hear from us within 7 days, please contact the clinic through Qualtricshart or phone. If you have a critical or abnormal lab result, we will notify you by phone as soon as possible.  Submit refill requests through POS on CLOUD or call your pharmacy and they will forward the refill request to us. Please allow 3 business days for your refill to be completed.          Additional Information About Your Visit        QualtricsharSemasio Information     POS on CLOUD lets you send messages to your doctor, view your test results, renew your prescriptions, schedule appointments and more. To sign up, go to [email or web address] . Click on \"Log in\" on the left side of the screen, which will take you to the Welcome page. Then click on \"Sign up Now\" on the right side of the page.     You will be asked to enter the access code listed below, as well as some personal information. Please follow the directions to create your username and password.     Your access code is: [email or web address]  Expires: 2017  4:32 PM     Your access code will  in 90 days. If you need help or a new code, please call your [place] clinic or [phone].        Care EveryWhere ID     This is your Care EveryWhere ID. This could be used by other organizations to access your [place] medical records  [id number]        Your Vitals Were     Pulse Temperature Last Period Breastfeeding? BMI (Body Mass Index)       80 97  F (36.1  C) (Oral) 2017 (Exact Date) No 23.46 kg/m2        Blood Pressure from Last 3 Encounters:   06/15/17 113/78   17 109/78   17 118/81    Weight from Last 3 Encounters:   06/15/17 141 lb (64 kg)   17 137 lb (62.1 kg)   17 135 lb (61.2 kg)              Today, you had the following     No orders found for display       Primary " Care Provider Office Phone # Fax #    Shazia Coleman [phone]       [address] [address]        Thank you!     Thank you for choosing [facility]  for your care. Our goal is always to provide you with excellent care. Hearing back from our patients is one way we can continue to improve our services. Please take a few minutes to complete the written survey that you may receive in the mail after your visit with us. Thank you!             Your Updated Medication List - Protect others around you: Learn how to safely use, store and throw away your medicines at www.disposemymeds.org.          This list is accurate as of: 6/15/17  4:32 PM.  Always use your most recent med list.                   Brand Name Dispense Instructions for use    prenatal multivitamin  plus iron 27-0.8 MG Tabs per tablet      Take 1 tablet by mouth daily

## 2017-06-15 NOTE — NURSING NOTE
"Chief Complaint   Patient presents with     Prenatal Care     OBV 17w3d       Initial /78 (BP Location: Left arm, Patient Position: Chair, Cuff Size: Adult Regular)  Pulse 80  Temp 97  F (36.1  C) (Oral)  Wt 141 lb (64 kg)  LMP 02/13/2017 (Exact Date)  Breastfeeding? No  BMI 23.46 kg/m2 Estimated body mass index is 23.46 kg/(m^2) as calculated from the following:    Height as of 3/21/17: 5' 5\" (1.651 m).    Weight as of this encounter: 141 lb (64 kg).  Medication Reconciliation: complete   Johann Beasley CMA      "

## 2017-06-15 NOTE — PATIENT INSTRUCTIONS
If you have any questions regarding your visit, Please contact your care team.     [facility] Access Services: [phone]    [facility] CLINIC HOURS TELEPHONE NUMBER   DON [place]-    Lakisha Gusman-MARIAN Wills-Medical Assistant   Monday-[place]  8:00a.m-4:45 p.m  Wednesday-[place] 8:00a.m-4:45 p.m.  Thursday-[place]  8:00a.m-4:45 p.m.  Friday-[place]  8:00a.m-4:45 p.m. [facility]  [address]  [place], [place] [address] [phone] ask [facility]  [phone] Fax  Imaging [phone]    [facility] Labor and Delivery  [address] [place] [address] Dr.  [place], [place] [address] [phone]    [facility]  [address] [place] [address]  [phone] ask [facility]  [phone] Fax  Imaging [phone]     Urgent Care locations:    [place]        [place] Monday-Friday  5 pm - 9 pm  Saturday and Sunday   9 am - 5 pm    Monday-Friday   11 am - 9 pm  Saturday and Sunday   9 am - 5 pm   [phone]       If you need a medication refill, please contact your pharmacy. Please allow 3 business days for your refill to be completed.  As always, Thank you for trusting us with your healthcare needs!

## 2017-06-15 NOTE — LETTER
[address] [address]  [phone]      June 20, 2017      Ely Lott  [address] [place] [address]            Dear Ely,    Your Maternal Quad screen came back normal.  We will see you at your next appointment.      Sincerely,  Kelvin Waddell MD

## 2017-06-18 NOTE — PROGRESS NOTES
No signif signs, symptoms or concerns except transient nausea. Here with .   Discussed special diagnostic and screening tests and plans (y = yes, n = no/declined, u = uncertain/considering): quad scr = y, survey u/s = y, Level 2 survey u/s with MFM = n, CF carrier scr = n, hemoglobinopathy or thalassemia scr= n, SMA, fragile X  and other genetic scr= n, 1st trimester scr with NT and later AFP or with cell free fetal DNA and later AFP = n, cell free fetal DNA= n, genetic amnio/CVS = n.  Advice appropriate to gestational age reviewed including pertinent Checklist items. Discussed condition, tests and care plan including RBA. Problem list updated. Survey u/s next.  A/P:  Ely was seen today for prenatal care.    Diagnoses and all orders for this visit:    Encounter for supervision of normal first pregnancy in second trimester  -     Maternal quad screen  -     US OB > 14 Weeks Complete Single; Future        Kelvin Waddell MD

## 2017-06-19 LAB
# FETUSES US: NORMAL
AFP ADJ MOM AMN: 1.15
AFP SERPL-MCNC: 47 NG/ML
AGE - REPORTED: 31
DATING METHOD: NORMAL
DIABETIC AT CONCEPTION: NO
FAMILY MEMBER DISEASES HX: NO
FAMILY MEMBER DISEASES HX: NO
GA METHOD: NORMAL
GA: 17.43 WK
HCG MOM SERPL: 2.34
HCG SERPL-ACNC: NORMAL M[IU]/ML
HX OF HEREDITARY DISORDERS: NO
IDDM PATIENT QL: NO
INHIBIN A MOM SERPL: 1.52
INHIBIN A SERPL-MCNC: 247
INTEGRATED SCN PATIENT-IMP: NORMAL
LMP START DATE: NORMAL
PATHOLOGY STUDY: NORMAL
PREV HX CHROMOSOME ABNORMALITY: NO
SPECIMEN DRAWN SERPL: NORMAL
TWINS: NORMAL
U ESTRIOL MOM SERPL: 2.02
U ESTRIOL SERPL-MCNC: 2.65 NG/ML

## 2017-07-03 ENCOUNTER — RADIANT APPOINTMENT (OUTPATIENT)
Dept: ULTRASOUND IMAGING | Facility: CLINIC | Age: 32
End: 2017-07-03
Attending: OBSTETRICS & GYNECOLOGY
Payer: COMMERCIAL

## 2017-07-03 DIAGNOSIS — Z34.02 ENCOUNTER FOR SUPERVISION OF NORMAL FIRST PREGNANCY IN SECOND TRIMESTER: ICD-10-CM

## 2017-07-03 PROCEDURE — 76805 OB US >/= 14 WKS SNGL FETUS: CPT

## 2017-07-20 ENCOUNTER — PRENATAL OFFICE VISIT (OUTPATIENT)
Dept: OBGYN | Facility: CLINIC | Age: 32
End: 2017-07-20
Payer: COMMERCIAL

## 2017-07-20 VITALS
TEMPERATURE: 98.7 F | SYSTOLIC BLOOD PRESSURE: 118 MMHG | BODY MASS INDEX: 25.63 KG/M2 | DIASTOLIC BLOOD PRESSURE: 70 MMHG | WEIGHT: 154 LBS | HEART RATE: 81 BPM | OXYGEN SATURATION: 100 %

## 2017-07-20 DIAGNOSIS — Z34.02 ENCOUNTER FOR SUPERVISION OF NORMAL FIRST PREGNANCY IN SECOND TRIMESTER: ICD-10-CM

## 2017-07-20 PROCEDURE — 99207 ZZC PRENATAL VISIT: CPT | Performed by: OBSTETRICS & GYNECOLOGY

## 2017-07-20 NOTE — NURSING NOTE
"Chief Complaint   Patient presents with     Prenatal Care     OBV 22w3d       Initial /70  Pulse 81  Temp 98.7  F (37.1  C) (Oral)  Wt 154 lb (69.9 kg)  LMP 02/13/2017 (Exact Date)  SpO2 100%  Breastfeeding? No  BMI 25.63 kg/m2 Estimated body mass index is 25.63 kg/(m^2) as calculated from the following:    Height as of 3/21/17: 5' 5\" (1.651 m).    Weight as of this encounter: 154 lb (69.9 kg).  Medication Reconciliation: complete   Johann Beasley CMA      "

## 2017-07-20 NOTE — PROGRESS NOTES
No signif signs, symptoms or concerns except weight gain noted. Advice appropriate to gestational age reviewed including pertinent Checklist items. Discussed condition, tests and care plan including RBA. Problem list updated. 1h GTT next.  A/P:  Ely was seen today for prenatal care.    Diagnoses and all orders for this visit:    Encounter for supervision of normal first pregnancy in second trimester        Kelvin Waddell MD

## 2017-07-20 NOTE — PATIENT INSTRUCTIONS
If you have any questions regarding your visit, Please contact your care team.     [facility] Access Services: [phone]    [facility] CLINIC HOURS TELEPHONE NUMBER   DON [place]-    Lakisha Gusman-MARIAN Wills-Medical Assistant   Monday-[place]  8:00a.m-4:45 p.m  Wednesday-[place] 8:00a.m-4:45 p.m.  Thursday-[place]  8:00a.m-4:45 p.m.  Friday-[place]  8:00a.m-4:45 p.m. [facility]  [address]  [place], [place] [address] [phone] ask [facility]  [phone] Fax  Imaging [phone]    [facility] Labor and Delivery  [address] [place] [address] Dr.  [place], [place] [address] [phone]    [facility]  [address] [place] [address]  [phone] ask [facility]  [phone] Fax  Imaging [phone]     Urgent Care locations:    [place]        [place] Monday-Friday  5 pm - 9 pm  Saturday and Sunday   9 am - 5 pm    Monday-Friday   11 am - 9 pm  Saturday and Sunday   9 am - 5 pm   [phone]       If you need a medication refill, please contact your pharmacy. Please allow 3 business days for your refill to be completed.  As always, Thank you for trusting us with your healthcare needs!

## 2017-07-20 NOTE — MR AVS SNAPSHOT
After Visit Summary   7/20/2017    Ely Lott    MRN: [id number]           Patient Information     Date Of Birth          1985        Visit Information        Provider Department      7/20/2017 11:45 AM Kelvin Waddell MD [facility]        Today's Diagnoses     Encounter for supervision of normal first pregnancy in second trimester          Care Instructions                                                        If you have any questions regarding your visit, Please contact your care team.     [facility] Access Services: [phone]    [facility] CLINIC HOURS TELEPHONE NUMBER   Kelvin Waddell M.D.      [place]-    Lakisha Gusman-MARIAN Wills-Medical Assistant   Monday-[place]  8:00a.m-4:45 p.m  Wednesday-[place] 8:00a.m-4:45 p.m.  Thursday-[place]  8:00a.m-4:45 p.m.  Friday-[place]  8:00a.m-4:45 p.m. [facility]  [address] [address] [place] [address]. [place] [address]  [phone] ask for [facility]  [phone] Fax  Imaging [phone]    [facility]  [address] [place] [address]  [phone]    [facility]  [address] [place] [address]  [phone] ask [facility]  [phone] Fax  Imaging [phone]     Urgent Care locations:    [facility] Monday-Friday  5 pm - 9 pm  Saturday and Sunday   9 am - 5 pm    Monday-Friday   11 am - 9 pm  Saturday and Sunday   9 am - 5 pm   [phone]       If you need a medication refill, please contact your pharmacy. Please allow 3 business days for your refill to be completed.  As always, Thank you for trusting us with your healthcare needs!            Follow-ups after your visit        Who to contact     If you have questions or need follow up information about today's clinic visit or your schedule please contact [facility]  "directly at [phone].  Normal or non-critical lab and imaging results will be communicated to you by Taggablehart, letter or phone within 4 business days after the clinic has received the results. If you do not hear from us within 7 days, please contact the clinic through Taggablehart or phone. If you have a critical or abnormal lab result, we will notify you by phone as soon as possible.  Submit refill requests through Cinsay or call your pharmacy and they will forward the refill request to us. Please allow 3 business days for your refill to be completed.          Additional Information About Your Visit        Taggablehart Information     Cinsay lets you send messages to your doctor, view your test results, renew your prescriptions, schedule appointments and more. To sign up, go to [email or web address] . Click on \"Log in\" on the left side of the screen, which will take you to the Welcome page. Then click on \"Sign up Now\" on the right side of the page.     You will be asked to enter the access code listed below, as well as some personal information. Please follow the directions to create your username and password.     Your access code is: [email or web address]  Expires: 2017  4:32 PM     Your access code will  in 90 days. If you need help or a new code, please call your [place] clinic or [phone].        Care EveryWhere ID     This is your Care EveryWhere ID. This could be used by other organizations to access your [place] medical records  [id number]        Your Vitals Were     Pulse Temperature Last Period Pulse Oximetry Breastfeeding? BMI (Body Mass Index)    81 98.7  F (37.1  C) (Oral) 2017 (Exact Date) 100% No 25.63 kg/m2       Blood Pressure from Last 3 Encounters:   17 118/70   06/15/17 113/78   17 109/78    Weight from Last 3 Encounters:   17 154 lb (69.9 kg)   06/15/17 141 lb (64 kg)   17 137 lb (62.1 kg)              Today, you had the following     No orders found for " display       Primary Care Provider Office Phone # Fax #    Shazia Coleman [phone]       [address] [address]        Equal Access to Services     GIOVANNI MTZ : Tucker shankar rafaelo Soomaali, waaxda luqadaha, qaybta kaalmada adeegyada, angelina hieu chrisziggy waltercelia parkskelsey diego. So [facility] [phone].    ATENCIÓN: Si habla español, tiene a piedra disposición servicios gratuitos de asistencia lingüística. Llame al [phone].    We comply with applicable federal civil rights laws and [place] laws. We do not discriminate on the basis of race, color, national origin, age, disability sex, sexual orientation or gender identity.            Thank you!     Thank you for choosing [facility]  for your care. Our goal is always to provide you with excellent care. Hearing back from our patients is one way we can continue to improve our services. Please take a few minutes to complete the written survey that you may receive in the mail after your visit with us. Thank you!             Your Updated Medication List - Protect others around you: Learn how to safely use, store and throw away your medicines at www.disposemymeds.org.          This list is accurate as of: 7/20/17 11:51 AM.  Always use your most recent med list.                   Brand Name Dispense Instructions for use Diagnosis    prenatal multivitamin  plus iron 27-0.8 MG Tabs per tablet      Take 1 tablet by mouth daily

## 2017-08-11 ENCOUNTER — NURSE TRIAGE (OUTPATIENT)
Dept: NURSING | Facility: CLINIC | Age: 32
End: 2017-08-11

## 2017-08-11 ENCOUNTER — OFFICE VISIT (OUTPATIENT)
Dept: URGENT CARE | Facility: URGENT CARE | Age: 32
End: 2017-08-11
Payer: COMMERCIAL

## 2017-08-11 VITALS
SYSTOLIC BLOOD PRESSURE: 118 MMHG | BODY MASS INDEX: 26.13 KG/M2 | HEART RATE: 77 BPM | TEMPERATURE: 98.4 F | WEIGHT: 157 LBS | DIASTOLIC BLOOD PRESSURE: 76 MMHG | OXYGEN SATURATION: 99 %

## 2017-08-11 DIAGNOSIS — M79.662 PAIN OF LEFT CALF: Primary | ICD-10-CM

## 2017-08-11 PROCEDURE — 99213 OFFICE O/P EST LOW 20 MIN: CPT | Performed by: NURSE PRACTITIONER

## 2017-08-11 NOTE — PROGRESS NOTES
SUBJECTIVE:                                                    Ely Lott is a 31 year old female who presents to clinic today for the following health issues:      Calf pIN      Duration: onset 10am this morning    Description  Location: lower left leg    Intensity:  severe    Accompanying signs and symptoms: numbness, bruising, constant muscle ache and cramping    History  Previous similar problem: no   Previous evaluation:  none    Precipitating or alleviating factors:  Trauma or overuse: no   Aggravating factors include: walking    Therapies tried and outcome: nothing        Problem list and histories reviewed & adjusted, as indicated.  Additional history: as documented    Patient Active Problem List   Diagnosis     ASCUS favor benign     Chronic neck pain     Supervision of normal first pregnancy     Past Surgical History:   Procedure Laterality Date     IR RHIZOTOMY  2015, 2016       Social History   Substance Use Topics     Smoking status: Never Smoker     Smokeless tobacco: Never Used     Alcohol use Yes      Comment: Occasional, not in PG     Family History   Problem Relation Age of Onset     DIABETES Father          Current Outpatient Prescriptions   Medication Sig Dispense Refill     Prenatal Vit-Fe Fumarate-FA (PRENATAL MULTIVITAMIN  PLUS IRON) 27-0.8 MG TABS per tablet Take 1 tablet by mouth daily       Allergies   Allergen Reactions     Penicillins          Reviewed and updated as needed this visit by clinical staff     Reviewed and updated as needed this visit by Provider         ROS:  C: NEGATIVE for fever, chills, change in weight  E/M: NEGATIVE for ear, mouth and throat problems  R: NEGATIVE for significant cough or SOB  CV: NEGATIVE for chest pain, palpitations or peripheral edema  MUSCULOSKELETAL: POSITIVE  for calf pain on left    OBJECTIVE:     /76 (BP Location: Left arm, Patient Position: Chair)  Pulse 77  Temp 98.4  F (36.9  C) (Oral)  Wt 157 lb (71.2 kg)  LMP 02/13/2017  (Exact Date)  SpO2 99%  BMI 26.13 kg/m2  Body mass index is 26.13 kg/(m^2).  GENERAL: healthy, alert and no distress  NECK: no adenopathy, no asymmetry, masses, or scars and thyroid normal to palpation  RESP: lungs clear to auscultation - no rales, rhonchi or wheezes  CV: regular rate and rhythm, normal S1 S2, no S3 or S4, no murmur, click or rub, no peripheral edema and peripheral pulses strong  ABDOMEN: soft, nontender, no hepatosplenomegaly, no masses and bowel sounds normal  MS: left calf muscle tenderness, slight bruising on skin        ASSESSMENT/PLAN:       ICD-10-CM    1. Pain of left calf M79.662      Patient is having a left calf muscle pain, no history of trauma or use of birth control, no recent travel. I advised her not to massage her calf and  to go to hospital for a venous doppler to rule out DVT.   The patient indicates understanding of these issues and agrees with the plan.  Rubi Lovell NP  [facility]

## 2017-08-11 NOTE — MR AVS SNAPSHOT
"              After Visit Summary   8/11/2017    Ely Lott    MRN: [id number]           Patient Information     Date Of Birth          1985        Visit Information        Provider Department      8/11/2017 6:25 PM Rubi Lovell NP [facility]        Today's Diagnoses     Pain of left calf    -  1       Follow-ups after your visit        Follow-up notes from your care team     Return if symptoms worsen or fail to improve.      Your next 10 appointments already scheduled     Aug 16, 2017  3:15 PM CDT   ESTABLISHED PRENATAL with Kelvin Waddell MD   [facility]    [address] [place] [id number] [phone]              Who to contact     If you have questions or need follow up information about today's clinic visit or your schedule please contact [facility] directly at [phone].  Normal or non-critical lab and imaging results will be communicated to you by MyChart, letter or phone within 4 business days after the clinic has received the results. If you do not hear from us within 7 days, please contact the clinic through MyChart or phone. If you have a critical or abnormal lab result, we will notify you by phone as soon as possible.  Submit refill requests through [employer] or call your pharmacy and they will forward the refill request to us. Please allow 3 business days for your refill to be completed.          Additional Information About Your Visit        MyChart Information     [employer] lets you send messages to your doctor, view your test results, renew your prescriptions, schedule appointments and more. To sign up, go to [email or web address] . Click on \"Log in\" on the left side of the screen, which will take you to the Welcome page. Then click on \"Sign up Now\" on the right side of the page.     You will be asked to enter the access code listed below, as well as some personal " information. Please follow the directions to create your username and password.     Your access code is: [email or web address]  Expires: 2017  4:32 PM     Your access code will  in 90 days. If you need help or a new code, please call your [place] clinic or [phone].        Care EveryWhere ID     This is your Care EveryWhere ID. This could be used by other organizations to access your [place] medical records  [id number]        Your Vitals Were     Pulse Temperature Last Period Pulse Oximetry BMI (Body Mass Index)       77 98.4  F (36.9  C) (Oral) 2017 (Exact Date) 99% 26.13 kg/m2        Blood Pressure from Last 3 Encounters:   17 118/76   17 118/70   06/15/17 113/78    Weight from Last 3 Encounters:   17 157 lb (71.2 kg)   17 154 lb (69.9 kg)   06/15/17 141 lb (64 kg)              Today, you had the following     No orders found for display       Primary Care Provider Office Phone # Fax #    Shazia Coleman [phone]       [address] [address]        Equal Access to Services     GIOVANNI MTZ : Hadii naomi hallo Soomaali, waaxda luqadaha, qaybta kaalmada adeegyada, waxay hieu haykarla sanderson . So [facility] [phone].    ATENCIÓN: Si habla español, tiene a piedra disposición servicios gratuitos de asistencia lingüística. Llame al [phone].    We comply with applicable federal civil rights laws and [place] laws. We do not discriminate on the basis of race, color, national origin, age, disability sex, sexual orientation or gender identity.            Thank you!     Thank you for choosing [facility]  for your care. Our goal is always to provide you with excellent care. Hearing back from our patients is one way we can continue to improve our services. Please take a few minutes to complete the written survey that you may receive in the mail after your visit with us. Thank  you!             Your Updated Medication List - Protect others around you: Learn how to safely use, store and throw away your medicines at www.disposemymeds.org.          This list is accurate as of: 8/11/17  7:42 PM.  Always use your most recent med list.                   Brand Name Dispense Instructions for use Diagnosis    prenatal multivitamin plus iron 27-0.8 MG Tabs per tablet      Take 1 tablet by mouth daily

## 2017-08-11 NOTE — NURSING NOTE
"Initial /76 (BP Location: Left arm, Patient Position: Chair)  Pulse 77  Temp 98.4  F (36.9  C) (Oral)  Wt 157 lb (71.2 kg)  LMP 02/13/2017 (Exact Date)  SpO2 99%  BMI 26.13 kg/m2 Estimated body mass index is 26.13 kg/(m^2) as calculated from the following:    Height as of 3/21/17: 5' 5\" (1.651 m).    Weight as of this encounter: 157 lb (71.2 kg). .    "

## 2017-08-11 NOTE — TELEPHONE ENCOUNTER
Reason for Disposition    [1] Thigh or calf pain AND [2] only 1 side AND [3] present > 1 hour    Additional Information    Negative: Shock suspected (e.g., cold/pale/clammy skin, too weak to stand, low BP, rapid pulse)    Negative: Sounds like a life-threatening emergency to the triager    Negative: Bruises with fever    Negative: Tiny bruises (spots or dots) of unknown cause    Negative: Bruise(s) of forehead or head    Negative: Bruise(s) of face or jaw    Negative: Followed an injury, and triager doesn't know which injury guideline to use first    Negative: Post-operative bruising    Negative: Dizziness or lightheadedness    Negative: [1] Bruise on head/face, chest, or abdomen AND [2]  taking Coumadin (warfarin) or other strong blood thinner, or known bleeding disorder (e.g., thrombocytopenia)    Negative: Unexplained bleeding from another site (e.g., gums, nose, urine) as well    Negative: Looks like a broken bone or dislocated joint (e.g., crooked or deformed)    Negative: Sounds like a life-threatening emergency to the triager    Negative: Followed a leg injury    Negative: Leg swelling is main symptom    Negative: Back pain radiating (shooting) into leg(s)    Negative: Knee pain is main symptom    Negative: Ankle pain is main symptom    Negative: Pregnant    Negative: Chest pain    Negative: Difficulty breathing    Negative: Entire foot is cool or blue in comparison to other side    Negative: Unable to walk    Negative: [1] Red area or streak AND [2] fever    Negative: [1] Swollen joint AND [2] fever    Negative: [1] Cast on leg or ankle AND [2] now increased pain    Negative: Patient sounds very sick or weak to the triager    Negative: [1] SEVERE pain (e.g., excruciating, unable to do any normal activities) AND [2] not improved after 2 hours of pain medicine    Protocols used: LEG PAIN-ADULT-AH, BRUISES-ADULT-AH    States pain is 6 out of 10. Pain is still present even though two purple bruises appeared.  Concern is that it's a clot in her calf. They are on their way to urgent care for evaluation. Advised they may refer her to an emergency room.  Josi Lira RN-[facility] Nurse Advisors

## 2017-08-16 ENCOUNTER — PRENATAL OFFICE VISIT (OUTPATIENT)
Dept: OBGYN | Facility: CLINIC | Age: 32
End: 2017-08-16
Payer: COMMERCIAL

## 2017-08-16 VITALS
TEMPERATURE: 97.2 F | BODY MASS INDEX: 26.46 KG/M2 | DIASTOLIC BLOOD PRESSURE: 76 MMHG | HEART RATE: 86 BPM | WEIGHT: 159 LBS | SYSTOLIC BLOOD PRESSURE: 117 MMHG

## 2017-08-16 DIAGNOSIS — Z34.02 ENCOUNTER FOR SUPERVISION OF NORMAL FIRST PREGNANCY IN SECOND TRIMESTER: ICD-10-CM

## 2017-08-16 LAB
GLUCOSE 1H P 50 G GLC PO SERPL-MCNC: 123 MG/DL (ref 60–129)
HGB BLD-MCNC: 11.2 G/DL (ref 11.7–15.7)

## 2017-08-16 PROCEDURE — 36415 COLL VENOUS BLD VENIPUNCTURE: CPT | Performed by: OBSTETRICS & GYNECOLOGY

## 2017-08-16 PROCEDURE — 82950 GLUCOSE TEST: CPT | Performed by: OBSTETRICS & GYNECOLOGY

## 2017-08-16 PROCEDURE — 99207 ZZC PRENATAL VISIT: CPT | Performed by: OBSTETRICS & GYNECOLOGY

## 2017-08-16 PROCEDURE — 00000218 ZZHCL STATISTIC OBHBG - HEMOGLOBIN: Performed by: OBSTETRICS & GYNECOLOGY

## 2017-08-16 NOTE — NURSING NOTE
"Chief Complaint   Patient presents with     Prenatal Care     OBV 26w1d       Initial /76 (BP Location: Left arm, Patient Position: Chair, Cuff Size: Adult Regular)  Pulse 86  Temp 97.2  F (36.2  C) (Oral)  Wt 159 lb (72.1 kg)  LMP 02/13/2017 (Exact Date)  Breastfeeding? No  BMI 26.46 kg/m2 Estimated body mass index is 26.46 kg/(m^2) as calculated from the following:    Height as of 3/21/17: 5' 5\" (1.651 m).    Weight as of this encounter: 159 lb (72.1 kg).  Medication Reconciliation: complete   Johann Beasley CMA      "

## 2017-08-16 NOTE — PROGRESS NOTES
No signif signs, symptoms or concerns except Urgent Care evaluation for left calf pain and neg leg u/s at [facility]- improved. Working [place] [employer]. Advice appropriate to gestational age reviewed including pertinent Checklist items. Discussed condition, tests and care plan including RBA. Problem list updated.   A/P:  Ely was seen today for prenatal care.    Diagnoses and all orders for this visit:    Encounter for supervision of normal first pregnancy in second trimester  -     Glucose tolerance gest screen 1 hour  -     OB hemoglobin        Kelvin Waddell MD

## 2017-08-16 NOTE — MR AVS SNAPSHOT
After Visit Summary   8/16/2017    Ely Lott    MRN: [id number]           Patient Information     Date Of Birth          1985        Visit Information        Provider Department      8/16/2017 3:15 PM Kelvin Waddell MD [facility]        Today's Diagnoses     Encounter for supervision of normal first pregnancy in second trimester          Care Instructions                                                        If you have any questions regarding your visit, Please contact your care team.     [facility] Access Services: [phone]    [facility] CLINIC HOURS TELEPHONE NUMBER   Kelvin Waddell M.D.      [place]-    Lakisha Gusman-MARIAN Wills-Medical Assistant   Monday-[place]  8:00a.m-4:45 p.m  Wednesday-[place] 8:00a.m-4:45 p.m.  Thursday-[place]  8:00a.m-4:45 p.m.  Friday-[place]  8:00a.m-4:45 p.m. [facility]  [address] [address] [place] [address]. [place] [address]  [phone] ask for [facility]  [phone] Fax  Imaging [phone]    [facility] Labor and Delivery  [address] [place] [address]  [phone]    [facility]  [address] [place] [address]  [phone] ask [facility]  [phone] Fax  Imaging [phone]     Urgent Care locations:    [facility] Monday-Friday  5 pm - 9 pm  Saturday and Sunday   9 am - 5 pm    Monday-Friday   11 am - 9 pm  Saturday and Sunday   9 am - 5 pm   [phone]       If you need a medication refill, please contact your pharmacy. Please allow 3 business days for your refill to be completed.  As always, Thank you for trusting us with your healthcare needs!            Follow-ups after your visit        Who to contact     If you have questions or need follow up information about today's clinic visit or your schedule please contact [facility]  "directly at [phone].  Normal or non-critical lab and imaging results will be communicated to you by Bringmehart, letter or phone within 4 business days after the clinic has received the results. If you do not hear from us within 7 days, please contact the clinic through Bringmehart or phone. If you have a critical or abnormal lab result, we will notify you by phone as soon as possible.  Submit refill requests through [employer] or call your pharmacy and they will forward the refill request to us. Please allow 3 business days for your refill to be completed.          Additional Information About Your Visit        BringmeharDotProduct Information     [employer] lets you send messages to your doctor, view your test results, renew your prescriptions, schedule appointments and more. To sign up, go to [email or web address] . Click on \"Log in\" on the left side of the screen, which will take you to the Welcome page. Then click on \"Sign up Now\" on the right side of the page.     You will be asked to enter the access code listed below, as well as some personal information. Please follow the directions to create your username and password.     Your access code is: [email or web address]  Expires: 2017  4:32 PM     Your access code will  in 90 days. If you need help or a new code, please call your [place] clinic or [phone].        Care EveryWhere ID     This is your Care EveryWhere ID. This could be used by other organizations to access your [place] medical records  [id number]        Your Vitals Were     Pulse Temperature Last Period Breastfeeding? BMI (Body Mass Index)       86 97.2  F (36.2  C) (Oral) 2017 (Exact Date) No 26.46 kg/m2        Blood Pressure from Last 3 Encounters:   17 117/76   17 118/76   17 118/70    Weight from Last 3 Encounters:   17 159 lb (72.1 kg)   17 157 lb (71.2 kg)   17 154 lb (69.9 kg)              Today, you had the following     No orders found for display       " Primary Care Provider Office Phone # Fax #    Shazia Coleman [phone]       [address] [address]        Equal Access to Services     GIOVANNI MTZ : Tucker shankar rafaelo Soomaali, waaxda luqadaha, qaybta kaalmada adeceliayada, angelina fernyin hayaaziggy martinez kaseykelsey diego. So [facility] [phone].    ATENCIÓN: Si habla español, tiene a piedra disposición servicios gratuitos de asistencia lingüística. Llame al [phone].    We comply with applicable federal civil rights laws and [place] laws. We do not discriminate on the basis of race, color, national origin, age, disability sex, sexual orientation or gender identity.            Thank you!     Thank you for choosing [facility]  for your care. Our goal is always to provide you with excellent care. Hearing back from our patients is one way we can continue to improve our services. Please take a few minutes to complete the written survey that you may receive in the mail after your visit with us. Thank you!             Your Updated Medication List - Protect others around you: Learn how to safely use, store and throw away your medicines at www.disposemymeds.org.          This list is accurate as of: 8/16/17  3:23 PM.  Always use your most recent med list.                   Brand Name Dispense Instructions for use Diagnosis    prenatal multivitamin plus iron 27-0.8 MG Tabs per tablet      Take 1 tablet by mouth daily

## 2017-08-16 NOTE — PATIENT INSTRUCTIONS
If you have any questions regarding your visit, Please contact your care team.     [facility] Access Services: [phone]    [facility] CLINIC HOURS TELEPHONE NUMBER   DON [place]-    Lakisha Gusman-MARIAN Wills-Medical Assistant   Monday-[place]  8:00a.m-4:45 p.m  Wednesday-[place] 8:00a.m-4:45 p.m.  Thursday-[place]  8:00a.m-4:45 p.m.  Friday-[place]  8:00a.m-4:45 p.m. [facility]  [address]  [place], [place] [address] [phone] ask [facility]  [phone] Fax  Imaging [phone]    [facility] Labor and Delivery  [address] [place] [address] Dr.  [place], [place] [address] [phone]    [facility]  [address] [place] [address]  [phone] ask [facility]  [phone] Fax  Imaging [phone]     Urgent Care locations:    [place]        [place] Monday-Friday  5 pm - 9 pm  Saturday and Sunday   9 am - 5 pm    Monday-Friday   11 am - 9 pm  Saturday and Sunday   9 am - 5 pm   [phone]       If you need a medication refill, please contact your pharmacy. Please allow 3 business days for your refill to be completed.  As always, Thank you for trusting us with your healthcare needs!

## 2017-09-03 ENCOUNTER — NURSE TRIAGE (OUTPATIENT)
Dept: NURSING | Facility: CLINIC | Age: 32
End: 2017-09-03
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
## 2017-09-06 ENCOUNTER — PRENATAL OFFICE VISIT (OUTPATIENT)
Dept: OBGYN | Facility: CLINIC | Age: 32
End: 2017-09-06
Payer: COMMERCIAL

## 2017-09-06 VITALS
WEIGHT: 166 LBS | DIASTOLIC BLOOD PRESSURE: 84 MMHG | TEMPERATURE: 96.4 F | SYSTOLIC BLOOD PRESSURE: 122 MMHG | BODY MASS INDEX: 27.62 KG/M2 | HEART RATE: 90 BPM

## 2017-09-06 DIAGNOSIS — Z34.03 ENCOUNTER FOR SUPERVISION OF NORMAL FIRST PREGNANCY IN THIRD TRIMESTER: ICD-10-CM

## 2017-09-06 PROCEDURE — 99207 ZZC PRENATAL VISIT: CPT | Performed by: OBSTETRICS & GYNECOLOGY

## 2017-09-06 NOTE — PATIENT INSTRUCTIONS
If you have any questions regarding your visit, Please contact your care team.     [facility]: [phone]    [facility] CLINIC HOURS TELEPHONE NUMBER   DON [place]-    Lakisha Gusman-MARIAN Wills-Medical Assistant   Monday-[place]  8:00a.m-4:45 p.m  Wednesday-[place] 8:00a.m-4:45 p.m.  Thursday-[place]  8:00a.m-4:45 p.m.  Friday-[place]  8:00a.m-4:45 p.m. [facility]  [address]  [place], [place] [address] [phone] ask [facility]  [phone] Fax  Imaging [phone]    [facility] Labor and Delivery  [address] [place] [address] Dr.  [place], [place] [address] [phone]    [facility]  [address] [place] [address]  [phone] ask [facility]  [phone] Fax  Imaging [phone]     Urgent Care locations:    [place]        [place] Monday-Friday  5 pm - 9 pm  Saturday and Sunday   9 am - 5 pm    Monday-Friday   11 am - 9 pm  Saturday and Sunday   9 am - 5 pm   [phone]       If you need a medication refill, please contact your pharmacy. Please allow 3 business days for your refill to be completed.  As always, Thank you for trusting us with your healthcare needs!

## 2017-09-06 NOTE — NURSING NOTE
"Chief Complaint   Patient presents with     Prenatal Care     OBV 29w2d       Initial /84 (BP Location: Left arm, Patient Position: Chair, Cuff Size: Adult Regular)  Pulse 90  Temp 96.4  F (35.8  C) (Oral)  Wt 166 lb (75.3 kg)  LMP 02/13/2017 (Exact Date)  Breastfeeding? No  BMI 27.62 kg/m2 Estimated body mass index is 27.62 kg/(m^2) as calculated from the following:    Height as of 3/21/17: 5' 5\" (1.651 m).    Weight as of this encounter: 166 lb (75.3 kg).  Medication Reconciliation: complete   Johann Beasley CMA      "

## 2017-09-06 NOTE — PROGRESS NOTES
No signif signs, symptoms or concerns except some activity related aches and weight gain reviewed. Starting labor classes soon. Advice appropriate to gestational age reviewed including pertinent Checklist items. Discussed condition, tests and care plan including RBA. Problem list updated. Considering Tdap next.  A/P:  Ely was seen today for prenatal care.    Diagnoses and all orders for this visit:    Encounter for supervision of normal first pregnancy in third trimester        Kelvin Waddell MD

## 2017-09-06 NOTE — MR AVS SNAPSHOT
After Visit Summary   9/6/2017    Ely Lott    MRN: [id number]           Patient Information     Date Of Birth          1985        Visit Information        Provider Department      9/6/2017 10:30 AM Kelvin Waddell MD [facility]        Today's Diagnoses     Encounter for supervision of normal first pregnancy in third trimester          Care Instructions                                                        If you have any questions regarding your visit, Please contact your care team.     [facility] Access Services: [phone]    [facility] CLINIC HOURS TELEPHONE NUMBER   Kelvin Waddell M.D.      [place]-    Lakisha Gusman-MARIAN Wills-Medical Assistant   Monday-[place]  8:00a.m-4:45 p.m  Wednesday-[place] 8:00a.m-4:45 p.m.  Thursday-[place]  8:00a.m-4:45 p.m.  Friday-[place]  8:00a.m-4:45 p.m. [facility]  [address] [place] [address]. [place] [address]  [phone] ask for [facility]  [phone] Fax  Imaging [phone]    [facility] Labor and Delivery  [address] [place] [address]  [phone]    [facility]  [address] [place] [address]  [phone] ask [facility]  [phone] Fax  Imaging [phone]     Urgent Care locations:    [facility] Monday-Friday  5 pm - 9 pm  Saturday and Sunday   9 am - 5 pm    Monday-Friday   11 am - 9 pm  Saturday and Sunday   9 am - 5 pm   [phone]       If you need a medication refill, please contact your pharmacy. Please allow 3 business days for your refill to be completed.  As always, Thank you for trusting us with your healthcare needs!              Follow-ups after your visit        Who to contact     If you have questions or need follow up information about today's clinic visit or your schedule please contact [facility]  directly at [phone].  Normal or non-critical lab and imaging results will be communicated to you by MyChart, letter or phone within 4 business days after the clinic has received the results. If you do not hear from us within 7 days, please contact the clinic through [employer] or phone. If you have a critical or abnormal lab result, we will notify you by phone as soon as possible.  Submit refill requests through [employer] or call your pharmacy and they will forward the refill request to us. Please allow 3 business days for your refill to be completed.          Additional Information About Your Visit        [employer] Information     [employer] gives you secure access to your electronic health record. If you see a primary care provider, you can also send messages to your care team and make appointments. If you have questions, please call your primary care clinic.  If you do not have a primary care provider, please call [phone] and they will assist you.        Care EveryWhere ID     This is your Care EveryWhere ID. This could be used by other organizations to access your [place] medical records  [id number]        Your Vitals Were     Pulse Temperature Last Period Breastfeeding? BMI (Body Mass Index)       90 96.4  F (35.8  C) (Oral) 02/13/2017 (Exact Date) No 27.62 kg/m2        Blood Pressure from Last 3 Encounters:   09/06/17 122/84   08/16/17 117/76   08/11/17 118/76    Weight from Last 3 Encounters:   09/06/17 166 lb (75.3 kg)   08/16/17 159 lb (72.1 kg)   08/11/17 157 lb (71.2 kg)              Today, you had the following     No orders found for display       Primary Care Provider Office Phone # Fax #    Shazia Coleman [phone]       [address] [address]        Equal Access to Services     GIOVANNI MTZ : Tucker Bryan, wajayesh luqadaha, qaybta kaalmada carlos, angelina diego. So [facility]  [phone].    ATENCIÓN: Si charlene bardales, tiene a piedra disposición servicios gratuitos de asistencia lingüística. Henny al [phone].    We comply with applicable federal civil rights laws and [place] laws. We do not discriminate on the basis of race, color, national origin, age, disability sex, sexual orientation or gender identity.            Thank you!     Thank you for choosing [facility]  for your care. Our goal is always to provide you with excellent care. Hearing back from our patients is one way we can continue to improve our services. Please take a few minutes to complete the written survey that you may receive in the mail after your visit with us. Thank you!             Your Updated Medication List - Protect others around you: Learn how to safely use, store and throw away your medicines at www.disposemymeds.org.          This list is accurate as of: 9/6/17 10:46 AM.  Always use your most recent med list.                   Brand Name Dispense Instructions for use Diagnosis    prenatal multivitamin plus iron 27-0.8 MG Tabs per tablet      Take 1 tablet by mouth daily

## 2017-09-20 ENCOUNTER — PRENATAL OFFICE VISIT (OUTPATIENT)
Dept: OBGYN | Facility: CLINIC | Age: 32
End: 2017-09-20
Payer: COMMERCIAL

## 2017-09-20 VITALS
WEIGHT: 166 LBS | HEART RATE: 81 BPM | TEMPERATURE: 97 F | BODY MASS INDEX: 27.62 KG/M2 | DIASTOLIC BLOOD PRESSURE: 61 MMHG | SYSTOLIC BLOOD PRESSURE: 119 MMHG

## 2017-09-20 DIAGNOSIS — Z34.03 ENCOUNTER FOR SUPERVISION OF NORMAL FIRST PREGNANCY IN THIRD TRIMESTER: Primary | ICD-10-CM

## 2017-09-20 DIAGNOSIS — Z23 NEED FOR TDAP VACCINATION: ICD-10-CM

## 2017-09-20 PROCEDURE — 99207 ZZC PRENATAL VISIT: CPT | Performed by: OBSTETRICS & GYNECOLOGY

## 2017-09-20 PROCEDURE — 90471 IMMUNIZATION ADMIN: CPT | Performed by: OBSTETRICS & GYNECOLOGY

## 2017-09-20 PROCEDURE — 90715 TDAP VACCINE 7 YRS/> IM: CPT | Performed by: OBSTETRICS & GYNECOLOGY

## 2017-09-20 NOTE — NURSING NOTE
Screening Questionnaire for Adult Immunization    Are you sick today?   No   Do you have allergies to medications, food, a vaccine component or latex?   Yes   Have you ever had a serious reaction after receiving a vaccination?   No   Do you have a long-term health problem with heart disease, lung disease, asthma, kidney disease, metabolic disease (e.g. diabetes), anemia, or other blood disorder?   No   Do you have cancer, leukemia, HIV/AIDS, or any other immune system problem?   No   In the past 3 months, have you taken medications that affect  your immune system, such as prednisone, other steroids, or anticancer drugs; drugs for the treatment of rheumatoid arthritis, Crohn s disease, or psoriasis; or have you had radiation treatments?   No   Have you had a seizure, or a brain or other nervous system problem?   No   During the past year, have you received a transfusion of blood or blood     products, or been given immune (gamma) globulin or antiviral drug?   No   For women: Are you pregnant or is there a chance you could become        pregnant during the next month?   Yes   Have you received any vaccinations in the past 4 weeks?   No     Immunization questionnaire Established pregnancy patient. Allergies noted in chart       Per orders of Dr. Waddell, injection of TDAP given by Johann Beasley. Patient instructed to remain in clinic for 15 minutes afterwards, and to report any adverse reaction to me immediately.       Screening performed by Johann Beasley on 9/20/2017 at 11:10 AM.

## 2017-09-20 NOTE — PATIENT INSTRUCTIONS
If you have any questions regarding your visit, Please contact your care team.     [employer] [facility]: [phone]    [facility] CLINIC HOURS TELEPHONE NUMBER   DON [place]-    Lakisha Gusman-MARIAN Wills-Medical Assistant   Monday-[place]  8:00a.m-4:45 p.m  Wednesday-[place] 8:00a.m-4:45 p.m.  Thursday-[place]  8:00a.m-4:45 p.m.  Friday-[place]  8:00a.m-4:45 p.m. [facility]  [address]  [place], [place] [address] [phone] ask [facility]  [phone] Fax  Imaging [phone]    [facility] Labor and Delivery  [address] [place] [address] Dr.  [place], [place] [address] [phone]    [facility]  [address] [place] [address]  [phone] ask [facility]  [phone] Fax  Imaging [phone]     Urgent Care locations:    [place]        [place] Monday-Friday  5 pm - 9 pm  Saturday and Sunday   9 am - 5 pm    Monday-Friday   11 am - 9 pm  Saturday and Sunday   9 am - 5 pm   [phone]       If you need a medication refill, please contact your pharmacy. Please allow 3 business days for your refill to be completed.  As always, Thank you for trusting us with your healthcare needs!

## 2017-09-20 NOTE — PROGRESS NOTES
No signif signs, symptoms or concerns. Tdap given. Flu vac declined. Advice appropriate to gestational age reviewed including pertinent Checklist items. Discussed condition, tests and care plan including RBA. Problem list updated.   A/P:  Ely was seen today for prenatal care.    Diagnoses and all orders for this visit:    Need for Tdap vaccination  -     TDAP VACCINE (ADACEL)    Encounter for supervision of normal first pregnancy in third trimester  -     TDAP VACCINE (ADACEL)        Kelvin Waddell MD

## 2017-09-20 NOTE — NURSING NOTE
"Chief Complaint   Patient presents with     Prenatal Care     OBV 31w1d       Initial /61 (BP Location: Left arm, Patient Position: Chair, Cuff Size: Adult Regular)  Pulse 81  Temp 97  F (36.1  C) (Oral)  Wt 166 lb (75.3 kg)  LMP 02/13/2017 (Exact Date)  Breastfeeding? No  BMI 27.62 kg/m2 Estimated body mass index is 27.62 kg/(m^2) as calculated from the following:    Height as of 3/21/17: 5' 5\" (1.651 m).    Weight as of this encounter: 166 lb (75.3 kg).  Medication Reconciliation: complete   Johann Beasley CMA      "

## 2017-09-20 NOTE — PROGRESS NOTES
Immunization History   Administered Date(s) Administered     DTAP (<7y) 01/06/1987, 04/06/1987, 05/28/1987, 05/11/1988, 02/07/1992, 11/04/1998     HIB 05/11/1998     HepB 01/11/1999, 06/16/1999     MMR 02/25/1988, 06/06/1996     OPV, trivalent, live 01/06/1987, 04/06/1987, 05/28/1987, 05/11/1988, 02/07/1992     TDAP Vaccine (Adacel) 04/12/2013, 09/20/2017

## 2017-09-20 NOTE — MR AVS SNAPSHOT
After Visit Summary   9/20/2017    Ely Lott    MRN: [id number]           Patient Information     Date Of Birth          1985        Visit Information        Provider Department      9/20/2017 11:00 AM Kelvin Waddell MD [facility]        Today's Diagnoses     Encounter for supervision of normal first pregnancy in third trimester          Care Instructions                                                        If you have any questions regarding your visit, Please contact your care team.     [facility] Access Services: [phone]    [facility] CLINIC HOURS TELEPHONE NUMBER   Kelvin Waddell M.D.      [place]-    Lakisha Gusman-MARIAN Wills-Medical Assistant   Monday-[place]  8:00a.m-4:45 p.m  Wednesday-[place] 8:00a.m-4:45 p.m.  Thursday-[place]  8:00a.m-4:45 p.m.  Friday-[place]  8:00a.m-4:45 p.m. [facility]  [address] [place] [address]. [place] [address]  [phone] ask for [facility] [place] [facility]  [phone] Fax  Imaging [phone]    [facility] Labor and Delivery  [address] [address]  [phone]    [facility]  [address] [place] [address]  [phone] ask [facility]  [phone] Fax  Imaging [phone]     Urgent Care locations:    [facility] Monday-Friday  5 pm - 9 pm  Saturday and Sunday   9 am - 5 pm    Monday-Friday   11 am - 9 pm  Saturday and Sunday   9 am - 5 pm   [phone]       If you need a medication refill, please contact your pharmacy. Please allow 3 business days for your refill to be completed.  As always, Thank you for trusting us with your healthcare needs!            Follow-ups after your visit        Who to contact     If you have questions or need follow up information about today's clinic visit or your schedule please contact [facility]  directly at [phone].  Normal or non-critical lab and imaging results will be communicated to you by MyChart, letter or phone within 4 business days after the clinic has received the results. If you do not hear from us within 7 days, please contact the clinic through [employer] or phone. If you have a critical or abnormal lab result, we will notify you by phone as soon as possible.  Submit refill requests through [employer] or call your pharmacy and they will forward the refill request to us. Please allow 3 business days for your refill to be completed.          Additional Information About Your Visit        [employer] Information     [employer] gives you secure access to your electronic health record. If you see a primary care provider, you can also send messages to your care team and make appointments. If you have questions, please call your primary care clinic.  If you do not have a primary care provider, please call [phone] and they will assist you.        Care EveryWhere ID     This is your Care EveryWhere ID. This could be used by other organizations to access your [place] medical records  [id number]        Your Vitals Were     Pulse Temperature Last Period Breastfeeding? BMI (Body Mass Index)       81 97  F (36.1  C) (Oral) 02/13/2017 (Exact Date) No 27.62 kg/m2        Blood Pressure from Last 3 Encounters:   09/20/17 119/61   09/06/17 122/84   08/16/17 117/76    Weight from Last 3 Encounters:   09/20/17 166 lb (75.3 kg)   09/06/17 166 lb (75.3 kg)   08/16/17 159 lb (72.1 kg)              Today, you had the following     No orders found for display       Primary Care Provider Office Phone # Fax #    Shazia Coleman [phone]       [address] [address]        Equal Access to Services     GIOVANNI MTZ : Tucker Bryan, wajayesh guardado, qaybta kaalmastephenie gonzalez, angelina diego. So [facility]  [phone].    ATENCIÓN: Si charlene bardales, tiene a piedra disposición servicios gratuitos de asistencia lingüística. Henny al [phone].    We comply with applicable federal civil rights laws and [place] laws. We do not discriminate on the basis of race, color, national origin, age, disability sex, sexual orientation or gender identity.            Thank you!     Thank you for choosing [facility]  for your care. Our goal is always to provide you with excellent care. Hearing back from our patients is one way we can continue to improve our services. Please take a few minutes to complete the written survey that you may receive in the mail after your visit with us. Thank you!             Your Updated Medication List - Protect others around you: Learn how to safely use, store and throw away your medicines at www.disposemymeds.org.          This list is accurate as of: 9/20/17 11:04 AM.  Always use your most recent med list.                   Brand Name Dispense Instructions for use Diagnosis    prenatal multivitamin plus iron 27-0.8 MG Tabs per tablet      Take 1 tablet by mouth daily

## 2017-10-03 ENCOUNTER — PRENATAL OFFICE VISIT (OUTPATIENT)
Dept: OBGYN | Facility: CLINIC | Age: 32
End: 2017-10-03
Payer: COMMERCIAL

## 2017-10-03 VITALS
DIASTOLIC BLOOD PRESSURE: 82 MMHG | BODY MASS INDEX: 28.12 KG/M2 | SYSTOLIC BLOOD PRESSURE: 121 MMHG | TEMPERATURE: 96.9 F | WEIGHT: 169 LBS | HEART RATE: 79 BPM

## 2017-10-03 DIAGNOSIS — Z34.03 ENCOUNTER FOR SUPERVISION OF NORMAL FIRST PREGNANCY IN THIRD TRIMESTER: ICD-10-CM

## 2017-10-03 DIAGNOSIS — R82.90 NONSPECIFIC FINDING ON EXAMINATION OF URINE: Primary | ICD-10-CM

## 2017-10-03 LAB
ALBUMIN UR-MCNC: ABNORMAL MG/DL
AMORPH CRY #/AREA URNS HPF: ABNORMAL /HPF
APPEARANCE UR: ABNORMAL
BACTERIA #/AREA URNS HPF: ABNORMAL /HPF
BILIRUB UR QL STRIP: NEGATIVE
COLOR UR AUTO: YELLOW
GLUCOSE UR STRIP-MCNC: NEGATIVE MG/DL
HGB UR QL STRIP: NEGATIVE
KETONES UR STRIP-MCNC: NEGATIVE MG/DL
LEUKOCYTE ESTERASE UR QL STRIP: ABNORMAL
MUCOUS THREADS #/AREA URNS LPF: PRESENT /LPF
NITRATE UR QL: NEGATIVE
NON-SQ EPI CELLS #/AREA URNS LPF: ABNORMAL /LPF
PH UR STRIP: 7 PH (ref 5–7)
RBC #/AREA URNS AUTO: ABNORMAL /HPF
SOURCE: ABNORMAL
SP GR UR STRIP: 1.01 (ref 1–1.03)
UROBILINOGEN UR STRIP-ACNC: 0.2 EU/DL (ref 0.2–1)
WBC #/AREA URNS AUTO: ABNORMAL /HPF

## 2017-10-03 PROCEDURE — 87086 URINE CULTURE/COLONY COUNT: CPT | Performed by: OBSTETRICS & GYNECOLOGY

## 2017-10-03 PROCEDURE — 99207 ZZC PRENATAL VISIT: CPT | Performed by: OBSTETRICS & GYNECOLOGY

## 2017-10-03 PROCEDURE — 81001 URINALYSIS AUTO W/SCOPE: CPT | Performed by: OBSTETRICS & GYNECOLOGY

## 2017-10-03 NOTE — MR AVS SNAPSHOT
After Visit Summary   10/3/2017    Ely Lott    MRN: [id number]           Patient Information     Date Of Birth          1985        Visit Information        Provider Department      10/3/2017 9:45 AM Kelvin Waddell MD [facility]        Today's Diagnoses     Encounter for supervision of normal first pregnancy in third trimester          Care Instructions                                                        If you have any questions regarding your visit, Please contact your care team.     [facility] Access Services: [phone]    [facility] CLINIC HOURS TELEPHONE NUMBER   Kelvin Waddell M.D.      [place]-    Lakisha Gusman-MARIAN Wills-Medical Assistant   Monday-[place]  8:00a.m-4:45 p.m  Wednesday-[place] 8:00a.m-4:45 p.m.  Thursday-[place]  8:00a.m-4:45 p.m.  Friday-[place]  8:00a.m-4:45 p.m. [facility]  [address] [place] [address]. [place] [address]  [phone] ask for [facility]  [phone] Fax  Imaging [phone]    [facility] Labor and Delivery  [address] [place] [address]  [phone]    [facility]  [address] [place] [address]  [phone] ask [facility]  [phone] Fax  Imaging [phone]     Urgent Care locations:    [facility] Monday-Friday  5 pm - 9 pm  Saturday and Sunday   9 am - 5 pm    Monday-Friday   11 am - 9 pm  Saturday and Sunday   9 am - 5 pm   [phone]       If you need a medication refill, please contact your pharmacy. Please allow 3 business days for your refill to be completed.  As always, Thank you for trusting us with your healthcare needs!            Follow-ups after your visit        Who to contact     If you have questions or need follow up information about today's clinic visit or your schedule please contact [facility]  directly at [phone].  Normal or non-critical lab and imaging results will be communicated to you by MyChart, letter or phone within 4 business days after the clinic has received the results. If you do not hear from us within 7 days, please contact the clinic through Swift[employer]hart or phone. If you have a critical or abnormal lab result, we will notify you by phone as soon as possible.  Submit refill requests through [employer] or call your pharmacy and they will forward the refill request to us. Please allow 3 business days for your refill to be completed.          Additional Information About Your Visit        [employer] Information     [employer] gives you secure access to your electronic health record. If you see a primary care provider, you can also send messages to your care team and make appointments. If you have questions, please call your primary care clinic.  If you do not have a primary care provider, please call [phone] and they will assist you.        Care EveryWhere ID     This is your Care EveryWhere ID. This could be used by other organizations to access your [place] medical records  [id number]        Your Vitals Were     Pulse Temperature Last Period Breastfeeding? BMI (Body Mass Index)       79 96.9  F (36.1  C) (Oral) 02/13/2017 (Exact Date) No 28.12 kg/m2        Blood Pressure from Last 3 Encounters:   10/03/17 121/82   09/20/17 119/61   09/06/17 122/84    Weight from Last 3 Encounters:   10/03/17 169 lb (76.7 kg)   09/20/17 166 lb (75.3 kg)   09/06/17 166 lb (75.3 kg)              Today, you had the following     No orders found for display       Primary Care Provider Office Phone # Fax #    Shazia Coleman [phone]       [address] [address]        Equal Access to Services     GIOVANNI MTZ : Tucker Bryan, waaxda luqadaha, qaybta kaalmada carlos, angelina diego. So [facility]  [phone].    ATENCIÓN: Si charlene bardales, tiene a piedra disposición servicios gratuitos de asistencia lingüística. Henny al [phone].    We comply with applicable federal civil rights laws and [place] laws. We do not discriminate on the basis of race, color, national origin, age, disability, sex, sexual orientation, or gender identity.            Thank you!     Thank you for choosing [facility]  for your care. Our goal is always to provide you with excellent care. Hearing back from our patients is one way we can continue to improve our services. Please take a few minutes to complete the written survey that you may receive in the mail after your visit with us. Thank you!             Your Updated Medication List - Protect others around you: Learn how to safely use, store and throw away your medicines at www.disposemymeds.org.          This list is accurate as of: 10/3/17  9:49 AM.  Always use your most recent med list.                   Brand Name Dispense Instructions for use Diagnosis    prenatal multivitamin plus iron 27-0.8 MG Tabs per tablet      Take 1 tablet by mouth daily

## 2017-10-03 NOTE — PROGRESS NOTES
No signif signs, symptoms or concerns except some urinary freq and mild dysuria- check UA. Taking classes- discussed Peds/FP. Advice appropriate to gestational age reviewed including pertinent Checklist items. Discussed condition, tests and care plan including RBA. Problem list updated. Possible GBS next.  A/P:  Ely was seen today for prenatal care.    Diagnoses and all orders for this visit:    Encounter for supervision of normal first pregnancy in third trimester  -     UA with Microscopic reflex to Culture        Kelvin Waddell MD

## 2017-10-03 NOTE — NURSING NOTE
"Chief Complaint   Patient presents with     Prenatal Care     OBV 33w1d       Initial /82 (BP Location: Left arm, Patient Position: Chair, Cuff Size: Adult Regular)  Pulse 79  Temp 96.9  F (36.1  C) (Oral)  Wt 169 lb (76.7 kg)  LMP 02/13/2017 (Exact Date)  Breastfeeding? No  BMI 28.12 kg/m2 Estimated body mass index is 28.12 kg/(m^2) as calculated from the following:    Height as of 3/21/17: 5' 5\" (1.651 m).    Weight as of this encounter: 169 lb (76.7 kg).  Medication Reconciliation: complete   Johann Beasley CMA      "

## 2017-10-04 LAB
BACTERIA SPEC CULT: NORMAL
SPECIMEN SOURCE: NORMAL

## 2017-10-17 ENCOUNTER — PRENATAL OFFICE VISIT (OUTPATIENT)
Dept: OBGYN | Facility: CLINIC | Age: 32
End: 2017-10-17
Payer: COMMERCIAL

## 2017-10-17 VITALS
BODY MASS INDEX: 28.79 KG/M2 | DIASTOLIC BLOOD PRESSURE: 82 MMHG | TEMPERATURE: 97 F | WEIGHT: 173 LBS | SYSTOLIC BLOOD PRESSURE: 122 MMHG | HEART RATE: 88 BPM

## 2017-10-17 DIAGNOSIS — Z34.03 ENCOUNTER FOR SUPERVISION OF NORMAL FIRST PREGNANCY IN THIRD TRIMESTER: ICD-10-CM

## 2017-10-17 PROCEDURE — 87653 STREP B DNA AMP PROBE: CPT | Performed by: OBSTETRICS & GYNECOLOGY

## 2017-10-17 PROCEDURE — 99207 ZZC PRENATAL VISIT: CPT | Performed by: OBSTETRICS & GYNECOLOGY

## 2017-10-17 NOTE — PATIENT INSTRUCTIONS
If you have any questions regarding your visit, Please contact your care team.     [facility] Access Services: [phone]    [facility] CLINIC HOURS TELEPHONE NUMBER   DON [place]-    Lakisha Gusman-MARIAN Wills-Medical Assistant   Monday-[place]  8:00a.m-4:45 p.m  Wednesday-[place] 8:00a.m-4:45 p.m.  Thursday-[place]  8:00a.m-4:45 p.m.  Friday-[place]  8:00a.m-4:45 p.m. [facility]  [address]  [place], [place] [address] [phone] ask [facility]  [phone] Fax  Imaging [phone]    [facility] Labor and Delivery  [address] [place] [address] Dr.  [place], [place] [address] [phone]    [facility]  [address] [place] [place] [address]  [phone] ask [facility]  [phone] Fax  Imaging [phone]     Urgent Care locations:    [place]        [place] Monday-Friday  5 pm - 9 pm  Saturday and Sunday   9 am - 5 pm    Monday-Friday   11 am - 9 pm  Saturday and Sunday   9 am - 5 pm   [phone]       If you need a medication refill, please contact your pharmacy. Please allow 3 business days for your refill to be completed.  As always, Thank you for trusting us with your healthcare needs!

## 2017-10-17 NOTE — PROGRESS NOTES
No signif signs, symptoms or concerns except pregnancy discomforts. Advice appropriate to gestational age reviewed including pertinent Checklist items. Discussed condition, tests and care plan including RBA. Problem list updated. Check cervix next.  A/P:  Ely was seen today for prenatal care.    Diagnoses and all orders for this visit:    Encounter for supervision of normal first pregnancy in third trimester  -     Group B strep PCR        Kelvin Waddell MD

## 2017-10-17 NOTE — MR AVS SNAPSHOT
After Visit Summary   10/17/2017    Ely Lott    MRN: [id number]           Patient Information     Date Of Birth          1985        Visit Information        Provider Department      10/17/2017 10:00 AM Kelvin Waddell MD [facility]        Today's Diagnoses     Encounter for supervision of normal first pregnancy in third trimester          Care Instructions                                                        If you have any questions regarding your visit, Please contact your care team.     [facility] Access Services: [phone]    [facility] CLINIC HOURS TELEPHONE NUMBER   Kelvin Waddell M.D.      [place]-    Lakisha Gusman-MARIAN Wills-Medical Assistant   Monday-[place]  8:00a.m-4:45 p.m  Wednesday-[place] 8:00a.m-4:45 p.m.  Thursday-[place]  8:00a.m-4:45 p.m.  Friday-[place]  8:00a.m-4:45 p.m. [facility]  [address] [place] [address]. [place] [address]  [phone] ask for [facility]  [phone] Fax  Imaging [phone]    [facility] Labor and Delivery  [address] [place] [address]  [phone]    [facility]  [address] [place] [address]  [phone] ask [facility]  [phone] Fax  Imaging [phone]     Urgent Care locations:    [facility] Monday-Friday  5 pm - 9 pm  Saturday and Sunday   9 am - 5 pm    Monday-Friday   11 am - 9 pm  Saturday and Sunday   9 am - 5 pm   [phone]       If you need a medication refill, please contact your pharmacy. Please allow 3 business days for your refill to be completed.  As always, Thank you for trusting us with your healthcare needs!            Follow-ups after your visit        Who to contact     If you have questions or need follow up information about today's clinic visit or your schedule please contact [facility]  directly at [phone].  Normal or non-critical lab and imaging results will be communicated to you by MyChart, letter or phone within 4 business days after the clinic has received the results. If you do not hear from us within 7 days, please contact the clinic through MDJunctionhart or phone. If you have a critical or abnormal lab result, we will notify you by phone as soon as possible.  Submit refill requests through [email or web address] or call your pharmacy and they will forward the refill request to us. Please allow 3 business days for your refill to be completed.          Additional Information About Your Visit        MDJunctionhart Information     [email or web address] gives you secure access to your electronic health record. If you see a primary care provider, you can also send messages to your care team and make appointments. If you have questions, please call your primary care clinic.  If you do not have a primary care provider, please call [phone] and they will assist you.        Care EveryWhere ID     This is your Care EveryWhere ID. This could be used by other organizations to access your [facility] medical records  [id number]        Your Vitals Were     Pulse Temperature Last Period Breastfeeding? BMI (Body Mass Index)       88 97  F (36.1  C) (Oral) 02/13/2017 (Exact Date) No 28.79 kg/m2        Blood Pressure from Last 3 Encounters:   10/17/17 122/82   10/03/17 121/82   09/20/17 119/61    Weight from Last 3 Encounters:   10/17/17 173 lb (78.5 kg)   10/03/17 169 lb (76.7 kg)   09/20/17 166 lb (75.3 kg)              Today, you had the following     No orders found for display       Primary Care Provider Office Phone # Fax #    Shazia Coleman [phone]       [address] [address]        Equal Access to Services     GIOVANNI MTZ : Tucker Bryan, wajayesh guardado, qaybta kaalmastephenie gonzalez, angelina diego. So [facility]  [phone].    ATENCIÓN: Si charlene bardales, tiene a piedra disposición servicios gratuitos de asistencia lingüística. Henny al [phone].    We comply with applicable federal civil rights laws and [place] laws. We do not discriminate on the basis of race, color, national origin, age, disability, sex, sexual orientation, or gender identity.            Thank you!     Thank you for choosing [facility]  for your care. Our goal is always to provide you with excellent care. Hearing back from our patients is one way we can continue to improve our services. Please take a few minutes to complete the written survey that you may receive in the mail after your visit with us. Thank you!             Your Updated Medication List - Protect others around you: Learn how to safely use, store and throw away your medicines at www.disposemymeds.org.          This list is accurate as of: 10/17/17 10:18 AM.  Always use your most recent med list.                   Brand Name Dispense Instructions for use Diagnosis    prenatal multivitamin plus iron 27-0.8 MG Tabs per tablet      Take 1 tablet by mouth daily

## 2017-10-17 NOTE — NURSING NOTE
"Chief Complaint   Patient presents with     Prenatal Care     OBV 35w1d       Initial /82 (BP Location: Left arm, Patient Position: Chair, Cuff Size: Adult Regular)  Pulse 88  Temp 97  F (36.1  C) (Oral)  Wt 173 lb (78.5 kg)  LMP 02/13/2017 (Exact Date)  Breastfeeding? No  BMI 28.79 kg/m2 Estimated body mass index is 28.79 kg/(m^2) as calculated from the following:    Height as of 3/21/17: 5' 5\" (1.651 m).    Weight as of this encounter: 173 lb (78.5 kg).  Medication Reconciliation: complete   Johann Beasley CMA      "

## 2017-10-18 LAB
GP B STREP DNA SPEC QL NAA+PROBE: NEGATIVE
SPECIMEN SOURCE: NORMAL

## 2017-10-26 ENCOUNTER — PRENATAL OFFICE VISIT (OUTPATIENT)
Dept: OBGYN | Facility: CLINIC | Age: 32
End: 2017-10-26
Payer: COMMERCIAL

## 2017-10-26 VITALS
SYSTOLIC BLOOD PRESSURE: 125 MMHG | TEMPERATURE: 96.4 F | WEIGHT: 176 LBS | DIASTOLIC BLOOD PRESSURE: 83 MMHG | BODY MASS INDEX: 29.29 KG/M2 | HEART RATE: 80 BPM

## 2017-10-26 DIAGNOSIS — Z34.03 ENCOUNTER FOR SUPERVISION OF NORMAL FIRST PREGNANCY IN THIRD TRIMESTER: ICD-10-CM

## 2017-10-26 PROCEDURE — 99207 ZZC PRENATAL VISIT: CPT | Performed by: OBSTETRICS & GYNECOLOGY

## 2017-10-26 NOTE — PATIENT INSTRUCTIONS
If you have any questions regarding your visit, Please contact your care team.     [facility] Access Services: [phone]    [facility] CLINIC HOURS TELEPHONE NUMBER   DON [place]-    Lakisha Gusman-MARIAN Wills-Medical Assistant   Monday-[place]  8:00a.m-4:45 p.m  Wednesday-[place] 8:00a.m-4:45 p.m.  Thursday-[place]  8:00a.m-4:45 p.m.  Friday-[place]  8:00a.m-4:45 p.m. [facility]  [address]  [place], [place] [address] [phone] ask [facility]  [phone] Fax  Imaging [phone]    [facility] Labor and Delivery  [address] [place] [address] Dr.  [place], [place] [address] [phone]    [facility]  [address] [place] [address]  [phone] ask [facility]  [phone] Fax  Imaging [phone]     Urgent Care locations:    [place]        [place] Monday-Friday  5 pm - 9 pm  Saturday and Sunday   9 am - 5 pm    Monday-Friday   11 am - 9 pm  Saturday and Sunday   9 am - 5 pm   [phone]       If you need a medication refill, please contact your pharmacy. Please allow 3 business days for your refill to be completed.  As always, Thank you for trusting us with your healthcare needs!

## 2017-10-26 NOTE — NURSING NOTE
"Chief Complaint   Patient presents with     Prenatal Care     OBV 36w3d       Initial /83 (BP Location: Left arm, Patient Position: Chair, Cuff Size: Adult Regular)  Pulse 80  Temp 96.4  F (35.8  C) (Oral)  Wt 176 lb (79.8 kg)  LMP 02/13/2017 (Exact Date)  Breastfeeding? No  BMI 29.29 kg/m2 Estimated body mass index is 29.29 kg/(m^2) as calculated from the following:    Height as of 3/21/17: 5' 5\" (1.651 m).    Weight as of this encounter: 176 lb (79.8 kg).  Medication Reconciliation: complete   Johann Beasley CMA      "

## 2017-10-26 NOTE — MR AVS SNAPSHOT
After Visit Summary   10/26/2017    Ely Lott    MRN: [id number]           Patient Information     Date Of Birth          1985        Visit Information        Provider Department      10/26/2017 1:45 PM Kelvin Waddell MD [facility]        Today's Diagnoses     Encounter for supervision of normal first pregnancy in third trimester          Care Instructions                                                        If you have any questions regarding your visit, Please contact your care team.     [facility] Access Services: [phone]    [facility] CLINIC HOURS TELEPHONE NUMBER   Kelvin Waddell M.D.      [place]-    Lakisha Gusman-MARIAN Wills-Medical Assistant   Monday-[place]  8:00a.m-4:45 p.m  Wednesday-[place] 8:00a.m-4:45 p.m.  Thursday-[place]  8:00a.m-4:45 p.m.  Friday-[place]  8:00a.m-4:45 p.m. [facility]  [address] [place] [address]. [place] [address]  [phone] ask for [facility]  [phone] Fax  Imaging [phone]    [facility] Labor and Delivery  [address] [place] [address]  [phone]    [facility]  [address] [place] [address]  [phone] ask [facility]  [phone] Fax  Imaging [phone]     Urgent Care locations:    [facility] Monday-Friday  5 pm - 9 pm  Saturday and Sunday   9 am - 5 pm    Monday-Friday   11 am - 9 pm  Saturday and Sunday   9 am - 5 pm   [phone]       If you need a medication refill, please contact your pharmacy. Please allow 3 business days for your refill to be completed.  As always, Thank you for trusting us with your healthcare needs!            Follow-ups after your visit        Who to contact     If you have questions or need follow up information about today's clinic visit or your schedule please contact [facility]  directly at [phone].  Normal or non-critical lab and imaging results will be communicated to you by MyChart, letter or phone within 4 business days after the clinic has received the results. If you do not hear from us within 7 days, please contact the clinic through Tunehart or phone. If you have a critical or abnormal lab result, we will notify you by phone as soon as possible.  Submit refill requests through [employer] or call your pharmacy and they will forward the refill request to us. Please allow 3 business days for your refill to be completed.          Additional Information About Your Visit        Tunehart Information     [employer] gives you secure access to your electronic health record. If you see a primary care provider, you can also send messages to your care team and make appointments. If you have questions, please call your primary care clinic.  If you do not have a primary care provider, please call [phone] and they will assist you.        Care EveryWhere ID     This is your Care EveryWhere ID. This could be used by other organizations to access your [facility] medical records  [id number]        Your Vitals Were     Pulse Temperature Last Period Breastfeeding? BMI (Body Mass Index)       80 96.4  F (35.8  C) (Oral) 02/13/2017 (Exact Date) No 29.29 kg/m2        Blood Pressure from Last 3 Encounters:   10/26/17 125/83   10/17/17 122/82   10/03/17 121/82    Weight from Last 3 Encounters:   10/26/17 176 lb (79.8 kg)   10/17/17 173 lb (78.5 kg)   10/03/17 169 lb (76.7 kg)              Today, you had the following     No orders found for display       Primary Care Provider Office Phone # Fax #    Shazia Coleman [phone]       [address] [address]        Equal Access to Services     GIOVANNI MTZ : Tucker Bryan, waaxda luqadaha, qaybta kaalmada carlos, angelina diego. So [facility]  [phone].    ATENCIÓN: Si charlene bardales, tiene a piedra disposición servicios gratuitos de asistencia lingüística. Henny al [phone].    We comply with applicable federal civil rights laws and [place] laws. We do not discriminate on the basis of race, color, national origin, age, disability, sex, sexual orientation, or gender identity.            Thank you!     Thank you for choosing [facility]  for your care. Our goal is always to provide you with excellent care. Hearing back from our patients is one way we can continue to improve our services. Please take a few minutes to complete the written survey that you may receive in the mail after your visit with us. Thank you!             Your Updated Medication List - Protect others around you: Learn how to safely use, store and throw away your medicines at www.disposemymeds.org.          This list is accurate as of: 10/26/17  1:53 PM.  Always use your most recent med list.                   Brand Name Dispense Instructions for use Diagnosis    prenatal multivitamin plus iron 27-0.8 MG Tabs per tablet      Take 1 tablet by mouth daily

## 2017-10-26 NOTE — PROGRESS NOTES
No signif signs, symptoms or concerns. Advice appropriate to gestational age reviewed including pertinent Checklist items. Discussed condition, tests and care plan including RBA. Problem list updated.   A/P:  Ely was seen today for prenatal care.    Diagnoses and all orders for this visit:    Encounter for supervision of normal first pregnancy in third trimester        Kelvin Waddell MD

## 2017-11-01 ENCOUNTER — PRENATAL OFFICE VISIT (OUTPATIENT)
Dept: OBGYN | Facility: CLINIC | Age: 32
End: 2017-11-01
Payer: COMMERCIAL

## 2017-11-01 VITALS
HEART RATE: 93 BPM | DIASTOLIC BLOOD PRESSURE: 85 MMHG | SYSTOLIC BLOOD PRESSURE: 122 MMHG | WEIGHT: 178 LBS | TEMPERATURE: 96.7 F | BODY MASS INDEX: 29.62 KG/M2

## 2017-11-01 DIAGNOSIS — Z34.03 ENCOUNTER FOR SUPERVISION OF NORMAL FIRST PREGNANCY IN THIRD TRIMESTER: ICD-10-CM

## 2017-11-01 PROCEDURE — 99207 ZZC PRENATAL VISIT: CPT | Performed by: OBSTETRICS & GYNECOLOGY

## 2017-11-01 NOTE — NURSING NOTE
"Chief Complaint   Patient presents with     Prenatal Care     OBV 37w2d       Initial /85 (BP Location: Left arm, Patient Position: Chair, Cuff Size: Adult Regular)  Pulse 93  Temp 96.7  F (35.9  C) (Oral)  Wt 178 lb (80.7 kg)  LMP 02/13/2017 (Exact Date)  Breastfeeding? No  BMI 29.62 kg/m2 Estimated body mass index is 29.62 kg/(m^2) as calculated from the following:    Height as of 3/21/17: 5' 5\" (1.651 m).    Weight as of this encounter: 178 lb (80.7 kg).  Medication Reconciliation: complete   Johann Beasley CMA      "

## 2017-11-01 NOTE — PROGRESS NOTES
No signif signs, symptoms or concerns except some contractions and headache. Here with . Advice appropriate to gestational age reviewed including pertinent Checklist items. Discussed condition, tests and care plan including RBA. Problem list updated.   A/P:  Ely was seen today for prenatal care.    Diagnoses and all orders for this visit:    Encounter for supervision of normal first pregnancy in third trimester        Kelvin Waddell MD

## 2017-11-01 NOTE — MR AVS SNAPSHOT
After Visit Summary   11/1/2017    Ely Lott    MRN: [id number]           Patient Information     Date Of Birth          1985        Visit Information        Provider Department      11/1/2017 2:00 PM Kelvin Waddell MD [facility]        Today's Diagnoses     Encounter for supervision of normal first pregnancy in third trimester          Care Instructions                                                        If you have any questions regarding your visit, Please contact your care team.     [facility] Access Services: [phone]    [facility] CLINIC HOURS TELEPHONE NUMBER   Kelvin Waddell M.D.      [place]-    Lakisha Gusman-RN    Christai-Medical Assistant   Monday-[place]  8:00a.m-4:45 p.m  Wednesday-[place] 8:00a.m-4:45 p.m.  Thursday-[place]  8:00a.m-4:45 p.m.  Friday-[place]  8:00a.m-4:45 p.m. [facility]  [address] [place] [address]. [place].  [place], [place] [address] [phone] ask [facility]  [phone] Fax  Imaging [phone]    [facility] Labor and Delivery  [address]  [place], [place] [address] [phone]    [facility]  [address] [facility] [place] [address]  [phone] ask [facility]  [phone] Fax  Imaging [phone]     Urgent Care locations:    [facility] Monday-Friday  5 pm - 9 pm  Saturday and Sunday   9 am - 5 pm    Monday-Friday   11 am - 9 pm  Saturday and Sunday   9 am - 5 pm   [phone]       If you need a medication refill, please contact your pharmacy. Please allow 3 business days for your refill to be completed.  As always, Thank you for trusting us with your healthcare needs!            Follow-ups after your visit        Your next 10 appointments already scheduled     Nov 08, 2017 11:15 AM CST   ESTABLISHED PRENATAL with Kelvin Waddell MD   [facility]  [facility]    [address] [facility] [id number] [phone]              Who to contact     If you have questions or need follow up information about today's clinic visit or your schedule please contact [facility] directly at [phone].  Normal or non-critical lab and imaging results will be communicated to you by MyChart, letter or phone within 4 business days after the clinic has received the results. If you do not hear from us within 7 days, please contact the clinic through BinWisehart or phone. If you have a critical or abnormal lab result, we will notify you by phone as soon as possible.  Submit refill requests through [employer] or call your pharmacy and they will forward the refill request to us. Please allow 3 business days for your refill to be completed.          Additional Information About Your Visit        MyChart Information     [employer] gives you secure access to your electronic health record. If you see a primary care provider, you can also send messages to your care team and make appointments. If you have questions, please call your primary care clinic.  If you do not have a primary care provider, please call [phone] and they will assist you.        Care EveryWhere ID     This is your Care EveryWhere ID. This could be used by other organizations to access your [facility] medical records  [id number]        Your Vitals Were     Pulse Temperature Last Period Breastfeeding? BMI (Body Mass Index)       93 96.7  F (35.9  C) (Oral) 02/13/2017 (Exact Date) No 29.62 kg/m2        Blood Pressure from Last 3 Encounters:   11/01/17 122/85   10/26/17 125/83   10/17/17 122/82    Weight from Last 3 Encounters:   11/01/17 178 lb (80.7 kg)   10/26/17 176 lb (79.8 kg)   10/17/17 173 lb (78.5 kg)              Today, you had the following     No orders found for display       Primary Care Provider Office Phone # Fax #    Shazia Coleman [phone]  [phone]       [address] [address]        Equal Access to Services     GIOVANNI MTZ : Hadii aad ku haddovdeb Irvin, wayonida tremainejo, giuseppe kajoseda carlos, angelina fernyin hayaan sabacelia montelongo lagenesisziggy johnathon. So [facility] [phone].    ATENCIÓN: Si habla español, tiene a piedra disposición servicios gratuitos de asistencia lingüística. Llame al [phone].    We comply with applicable federal civil rights laws and [place] laws. We do not discriminate on the basis of race, color, national origin, age, disability, sex, sexual orientation, or gender identity.            Thank you!     Thank you for choosing [facility]  for your care. Our goal is always to provide you with excellent care. Hearing back from our patients is one way we can continue to improve our services. Please take a few minutes to complete the written survey that you may receive in the mail after your visit with us. Thank you!             Your Updated Medication List - Protect others around you: Learn how to safely use, store and throw away your medicines at www.disposemymeds.org.          This list is accurate as of: 11/1/17  2:05 PM.  Always use your most recent med list.                   Brand Name Dispense Instructions for use Diagnosis    prenatal multivitamin plus iron 27-0.8 MG Tabs per tablet      Take 1 tablet by mouth daily

## 2017-11-01 NOTE — PATIENT INSTRUCTIONS
If you have any questions regarding your visit, Please contact your care team.     [facility] Access Services: [phone]    [facility] CLINIC HOURS TELEPHONE NUMBER   DON [place]-    Lakisha Gusman-MARIAN Wills-Medical Assistant   Monday-[place]  8:00a.m-4:45 p.m  Wednesday-[place] 8:00a.m-4:45 p.m.  Thursday-[place]  8:00a.m-4:45 p.m.  Friday-[place]  8:00a.m-4:45 p.m. [facility]  [address]  [place], [place] [address] [phone] ask [facility]  [phone] Fax  Imaging [phone]    [facility] Labor and Delivery  [address] [place] [address] Dr.  [place], [place] [address] [phone]    [facility]  [address] [place] [address]  [phone] ask [facility]  [phone] Fax  Imaging [phone]     Urgent Care locations:    [place]        [place] Monday-Friday  5 pm - 9 pm  Saturday and Sunday   9 am - 5 pm    Monday-Friday   11 am - 9 pm  Saturday and Sunday   9 am - 5 pm   [phone]       If you need a medication refill, please contact your pharmacy. Please allow 3 business days for your refill to be completed.  As always, Thank you for trusting us with your healthcare needs!

## 2017-11-08 ENCOUNTER — PRENATAL OFFICE VISIT (OUTPATIENT)
Dept: OBGYN | Facility: CLINIC | Age: 32
End: 2017-11-08
Payer: COMMERCIAL

## 2017-11-08 VITALS
WEIGHT: 179 LBS | DIASTOLIC BLOOD PRESSURE: 85 MMHG | BODY MASS INDEX: 29.79 KG/M2 | TEMPERATURE: 96.8 F | HEART RATE: 95 BPM | SYSTOLIC BLOOD PRESSURE: 126 MMHG

## 2017-11-08 DIAGNOSIS — Z34.03 ENCOUNTER FOR SUPERVISION OF NORMAL FIRST PREGNANCY IN THIRD TRIMESTER: ICD-10-CM

## 2017-11-08 PROCEDURE — 99207 ZZC PRENATAL VISIT: CPT | Performed by: OBSTETRICS & GYNECOLOGY

## 2017-11-08 NOTE — PROGRESS NOTES
No signif signs, symptoms or concerns. Here with . Advice appropriate to gestational age reviewed including pertinent Checklist items. Discussed condition, tests and care plan including RBA. Problem list updated.   A/P:  Ely was seen today for prenatal care.    Diagnoses and all orders for this visit:    Encounter for supervision of normal first pregnancy in third trimester        Kelvin Waddell MD

## 2017-11-08 NOTE — PATIENT INSTRUCTIONS
If you have any questions regarding your visit, Please contact your care team.     [employer]: [phone]    [facility] CLINIC HOURS TELEPHONE NUMBER   DON Murrieta-    Radha Gusman-MARIAN Wills-Medical Assistant   Monday-[place]  8:00a.m-4:45 p.m  Wednesday-[place] 8:00a.m-4:45 p.m.  Thursday-[place]  8:00a.m-4:45 p.m.  Friday-[place]  8:00a.m-4:45 p.m. [facility]  [address] [place] [address]  [phone] ask [facility]  [phone] Fax  Imaging [phone]    [facility] Labor and Delivery  [address] [place] [address] [place] [address]  [phone]    [facility]  [address] [place] [address]  [phone] [facility]  [phone] Fax  Imaging [phone]     Urgent Care locations:    [place]        [place] Monday-Friday  5 pm - 9 pm  Saturday and Sunday   9 am - 5 pm    Monday-Friday   11 am - 9 pm  Saturday and Sunday   9 am - 5 pm   [phone]       If you need a medication refill, please contact your pharmacy. Please allow 3 business days for your refill to be completed.  As always, Thank you for trusting us with your healthcare needs!

## 2017-11-08 NOTE — MR AVS SNAPSHOT
After Visit Summary   11/8/2017    Ely Lott    MRN: [id number]           Patient Information     Date Of Birth          1985        Visit Information        Provider Department      11/8/2017 11:15 AM Kelvin Waddell MD [facility]        Today's Diagnoses     Encounter for supervision of normal first pregnancy in third trimester          Care Instructions                                                        If you have any questions regarding your visit, Please contact your care team.     [facility] Access Services: [phone]    [facility] CLINIC HOURS TELEPHONE NUMBER   Kelvin Waddell M.D.      Marley-    Radha Gusman-RN    Christai-Medical Assistant   Monday-[place]  8:00a.m-4:45 p.m  Wednesday-[place] 8:00a.m-4:45 p.m.  Thursday-[place]  8:00a.m-4:45 p.m.  Friday-[place]  8:00a.m-4:45 p.m. [facility]  [address] [place] [address]  [phone] ask [facility]  [phone] Fax  Imaging [phone]    [facility] Labor and Delivery  [address] [place] [address] Dr.  [place], [place] [address] [phone]    [facility]  [address] [facility] [place] [address]  [phone] ask [facility]  [phone] Fax  Imaging [phone]     Urgent Care locations:    [facility] Monday-Friday  5 pm - 9 pm  Saturday and Sunday   9 am - 5 pm    Monday-Friday   11 am - 9 pm  Saturday and Sunday   9 am - 5 pm   [phone]       If you need a medication refill, please contact your pharmacy. Please allow 3 business days for your refill to be completed.  As always, Thank you for trusting us with your healthcare needs!            Follow-ups after your visit        Your next 10 appointments already scheduled     Nov 08, 2017 11:15 AM CST   ESTABLISHED PRENATAL with Kelvin Waddell MD   [facility] ([place]  [facility])    [address] [facility] [id number] [phone]            Nov 15, 2017  3:00 PM CST   ESTABLISHED PRENATAL with Kelvin Waddell MD   [facility]    [address] [facility] [id number] [phone]              Who to contact     If you have questions or need follow up information about today's clinic visit or your schedule please contact [facility] directly at [phone].  Normal or non-critical lab and imaging results will be communicated to you by [employer], letter or phone within 4 business days after the clinic has received the results. If you do not hear from us within 7 days, please contact the clinic through "[employer]t or phone. If you have a critical or abnormal lab result, we will notify you by phone as soon as possible.  Submit refill requests through [employer] or call your pharmacy and they will forward the refill request to us. Please allow 3 business days for your refill to be completed.          Additional Information About Your Visit        MyChart Information     [employer] gives you secure access to your electronic health record. If you see a primary care provider, you can also send messages to your care team and make appointments. If you have questions, please call your primary care clinic.  If you do not have a primary care provider, please call [phone] and they will assist you.        Care EveryWhere ID     This is your Care EveryWhere ID. This could be used by other organizations to access your [place] medical records  [id number]        Your Vitals Were     Pulse Temperature Last Period Breastfeeding? BMI (Body Mass Index)       95 96.8  F (36  C) (Oral) 02/13/2017 (Exact Date) No 29.79 kg/m2        Blood Pressure from Last 3 Encounters:   11/08/17 126/85   11/01/17 122/85   10/26/17 125/83    Weight from Last 3 Encounters:   11/08/17 179 lb (81.2 kg)    11/01/17 178 lb (80.7 kg)   10/26/17 176 lb (79.8 kg)              Today, you had the following     No orders found for display       Primary Care Provider Office Phone # Fax #    Shazia Coleman [phone]       [address] [address]        Equal Access to Services     AGUSTÍN MTZ : Hadii aad ku hadasho Soomaali, waaxda luqadaha, qaybta kaalmada adeegyada, waxay idiin hayaan adeeg kharash la'aan ah. So [facility] [phone].    ATENCIÓN: Si habla español, tiene a piedra disposición servicios gratuitos de asistencia lingüística. Llame al [phone].    We comply with applicable federal civil rights laws and [place] laws. We do not discriminate on the basis of race, color, national origin, age, disability, sex, sexual orientation, or gender identity.            Thank you!     Thank you for choosing [facility]  for your care. Our goal is always to provide you with excellent care. Hearing back from our patients is one way we can continue to improve our services. Please take a few minutes to complete the written survey that you may receive in the mail after your visit with us. Thank you!             Your Updated Medication List - Protect others around you: Learn how to safely use, store and throw away your medicines at www.disposemymeds.org.          This list is accurate as of: 11/8/17 11:02 AM.  Always use your most recent med list.                   Brand Name Dispense Instructions for use Diagnosis    prenatal multivitamin plus iron 27-0.8 MG Tabs per tablet      Take 1 tablet by mouth daily

## 2017-11-08 NOTE — NURSING NOTE
"Chief Complaint   Patient presents with     Prenatal Care     OBV 38w2d       Initial /85 (BP Location: Left arm, Patient Position: Chair, Cuff Size: Adult Regular)  Pulse 95  Temp 96.8  F (36  C) (Oral)  Wt 179 lb (81.2 kg)  LMP 02/13/2017 (Exact Date)  Breastfeeding? No  BMI 29.79 kg/m2 Estimated body mass index is 29.79 kg/(m^2) as calculated from the following:    Height as of 3/21/17: 5' 5\" (1.651 m).    Weight as of this encounter: 179 lb (81.2 kg).  Medication Reconciliation: complete   Johann Beasley CMA      "

## 2017-11-15 ENCOUNTER — PRENATAL OFFICE VISIT (OUTPATIENT)
Dept: OBGYN | Facility: CLINIC | Age: 32
End: 2017-11-15
Payer: COMMERCIAL

## 2017-11-15 VITALS
TEMPERATURE: 97.1 F | SYSTOLIC BLOOD PRESSURE: 121 MMHG | HEART RATE: 83 BPM | WEIGHT: 180 LBS | BODY MASS INDEX: 29.95 KG/M2 | DIASTOLIC BLOOD PRESSURE: 88 MMHG

## 2017-11-15 DIAGNOSIS — Z34.03 ENCOUNTER FOR SUPERVISION OF NORMAL FIRST PREGNANCY IN THIRD TRIMESTER: ICD-10-CM

## 2017-11-15 PROCEDURE — 99207 ZZC PRENATAL VISIT: CPT | Performed by: OBSTETRICS & GYNECOLOGY

## 2017-11-15 NOTE — NURSING NOTE
"Chief Complaint   Patient presents with     Prenatal Care     OBV 39w2d       Initial /88 (BP Location: Left arm, Patient Position: Chair, Cuff Size: Adult Regular)  Pulse 83  Temp 97.1  F (36.2  C) (Oral)  Wt 180 lb (81.6 kg)  LMP 02/13/2017 (Exact Date)  Breastfeeding? No  BMI 29.95 kg/m2 Estimated body mass index is 29.95 kg/(m^2) as calculated from the following:    Height as of 3/21/17: 5' 5\" (1.651 m).    Weight as of this encounter: 180 lb (81.6 kg).  Medication Reconciliation: complete   Johann Beasley CMA      "

## 2017-11-15 NOTE — PROGRESS NOTES
No signif signs, symptoms or concerns except desires elective induction soon and will arrange. Here with . Advice appropriate to gestational age reviewed including pertinent Checklist items. Discussed condition, tests and care plan including RBA. Problem list updated.   A/P:  Ely was seen today for prenatal care.    Diagnoses and all orders for this visit:    Encounter for supervision of normal first pregnancy in third trimester        Kelvin Waddell MD

## 2017-11-15 NOTE — MR AVS SNAPSHOT
After Visit Summary   11/15/2017    Ely Lott    MRN: [id number]           Patient Information     Date Of Birth          1985        Visit Information        Provider Department      11/15/2017 3:00 PM Kelvin Waddell MD [facility]        Today's Diagnoses     Encounter for supervision of normal first pregnancy in third trimester          Care Instructions                                                        If you have any questions regarding your visit, Please contact your care team.     [facility] Access Services: [phone]    [facility] CLINIC HOURS TELEPHONE NUMBER   Kelvin Waddell M.D.      Mar[place]-    Radha Gusman-MARIAN Wills-Medical Assistant   Monday-[place]  8:00a.m-4:45 p.m  Wednesday-[place] 8:00a.m-4:45 p.m.  Thursday-[place]  8:00a.m-4:45 p.m.  Friday-[place]  8:00a.m-4:45 p.m. [facility]  [address] [place], [place] [address] [address]  [phone] ask for [facility]  [phone] Fax  Imaging [phone]    [facility] Labor and Delivery  [address] [place] [address] Dr.  [place], [place] [address] [phone]    [facility]  [address] [place] [address]  [phone] ask [facility]  [phone] Fax  Imaging [phone]     Urgent Care locations:    [facility] Monday-Friday  5 pm - 9 pm  Saturday and Sunday   9 am - 5 pm    Monday-Friday   11 am - 9 pm  Saturday and Sunday   9 am - 5 pm   [phone]       If you need a medication refill, please contact your pharmacy. Please allow 3 business days for your refill to be completed.  As always, Thank you for trusting us with your healthcare needs!            Follow-ups after your visit        Who to contact     If you have questions or need follow up information about today's clinic visit or your schedule please contact [facility]  directly at [phone].  Normal or non-critical lab and imaging results will be communicated to you by MyChart, letter or phone within 4 business days after the clinic has received the results. If you do not hear from us within 7 days, please contact the clinic through [employer] or phone. If you have a critical or abnormal lab result, we will notify you by phone as soon as possible.  Submit refill requests through Kauli or call your pharmacy and they will forward the refill request to us. Please allow 3 business days for your refill to be completed.          Additional Information About Your Visit        [employer] Information     [employer] gives you secure access to your electronic health record. If you see a primary care provider, you can also send messages to your care team and make appointments. If you have questions, please call your primary care clinic.  If you do not have a primary care provider, please call [phone] and they will assist you.        Care EveryWhere ID     This is your Care EveryWhere ID. This could be used by other organizations to access your [place] medical records  [id number]        Your Vitals Were     Pulse Temperature Last Period Breastfeeding? BMI (Body Mass Index)       83 97.1  F (36.2  C) (Oral) 02/13/2017 (Exact Date) No 29.95 kg/m2        Blood Pressure from Last 3 Encounters:   11/15/17 121/88   11/08/17 126/85   11/01/17 122/85    Weight from Last 3 Encounters:   11/15/17 180 lb (81.6 kg)   11/08/17 179 lb (81.2 kg)   11/01/17 178 lb (80.7 kg)              Today, you had the following     No orders found for display       Primary Care Provider Office Phone # Fax #    Shazia Coleman [phone]       [address] [address]        Equal Access to Services     GIOVANNI MTZ : Tucker Bryan, wajayesh luqadaha, qaybta kaalmada carlos, angelina diego. So [facility]  [phone].    ATENCIÓN: Si charlene bardales, tiene a piedra disposición servicios gratuitos de asistencia lingüística. Henny al [phone].    We comply with applicable federal civil rights laws and [place] laws. We do not discriminate on the basis of race, color, national origin, age, disability, sex, sexual orientation, or gender identity.            Thank you!     Thank you for choosing [facility]  for your care. Our goal is always to provide you with excellent care. Hearing back from our patients is one way we can continue to improve our services. Please take a few minutes to complete the written survey that you may receive in the mail after your visit with us. Thank you!             Your Updated Medication List - Protect others around you: Learn how to safely use, store and throw away your medicines at www.disposemymeds.org.          This list is accurate as of: 11/15/17  3:07 PM.  Always use your most recent med list.                   Brand Name Dispense Instructions for use Diagnosis    prenatal multivitamin plus iron 27-0.8 MG Tabs per tablet      Take 1 tablet by mouth daily

## 2017-11-15 NOTE — PATIENT INSTRUCTIONS
If you have any questions regarding your visit, Please contact your care team.     [employer] Access Services: [phone]    [facility] CLINIC HOURS TELEPHONE NUMBER   DON [place]-    Radha Gusman-MARIAN Wills-Medical Assistant   Monday-[place]  8:00a.m-4:45 p.m  Wednesday-[place] 8:00a.m-4:45 p.m.  Thursday-[place]  8:00a.m-4:45 p.m.  Friday-[place]  8:00a.m-4:45 p.m. [facility]  [address] [place] [address]  [phone] ask [facility]  [phone] Fax  Imaging [phone]    [facility] Labor and Delivery  [address] [place] [address] [place] [address]  [phone]    [facility]  [address] [place] [address]  [phone] [facility]  [phone] Fax  Imaging [phone]     Urgent Care locations:    [place]        [place] Monday-Friday  5 pm - 9 pm  Saturday and Sunday   9 am - 5 pm    Monday-Friday   11 am - 9 pm  Saturday and Sunday   9 am - 5 pm   [phone]       If you need a medication refill, please contact your pharmacy. Please allow 3 business days for your refill to be completed.  As always, Thank you for trusting us with your healthcare needs!

## 2017-11-30 ENCOUNTER — PRENATAL OFFICE VISIT (OUTPATIENT)
Dept: OBGYN | Facility: CLINIC | Age: 32
End: 2017-11-30
Payer: COMMERCIAL

## 2017-11-30 VITALS
TEMPERATURE: 97.1 F | SYSTOLIC BLOOD PRESSURE: 114 MMHG | BODY MASS INDEX: 26.13 KG/M2 | DIASTOLIC BLOOD PRESSURE: 78 MMHG | HEART RATE: 97 BPM | WEIGHT: 157 LBS

## 2017-11-30 DIAGNOSIS — R52 POSTPARTUM PAIN: Primary | ICD-10-CM

## 2017-11-30 PROCEDURE — 99024 POSTOP FOLLOW-UP VISIT: CPT | Performed by: OBSTETRICS & GYNECOLOGY

## 2017-11-30 RX ORDER — OXYCODONE AND ACETAMINOPHEN 5; 325 MG/1; MG/1
1-2 TABLET ORAL EVERY 6 HOURS PRN
Qty: 12 TABLET | Refills: 0 | Status: SHIPPED | OUTPATIENT
Start: 2017-11-30 | End: 2018-01-04

## 2017-11-30 RX ORDER — OXYCODONE AND ACETAMINOPHEN 5; 325 MG/1; MG/1
1-2 TABLET ORAL
COMMUNITY
Start: 2017-11-19 | End: 2017-11-30

## 2017-11-30 RX ORDER — IBUPROFEN 600 MG/1
600 TABLET, FILM COATED ORAL
COMMUNITY
Start: 2017-11-19 | End: 2018-01-04

## 2017-11-30 NOTE — NURSING NOTE
"Chief Complaint   Patient presents with     Post Partum Exam     Follow-up delivery from 11/17/17       Initial /78 (BP Location: Left arm, Patient Position: Chair, Cuff Size: Adult Regular)  Pulse 97  Temp 97.1  F (36.2  C) (Oral)  Wt 157 lb (71.2 kg)  LMP 02/13/2017 (Exact Date)  Breastfeeding? Yes  BMI 26.13 kg/m2 Estimated body mass index is 26.13 kg/(m^2) as calculated from the following:    Height as of 3/21/17: 5' 5\" (1.651 m).    Weight as of this encounter: 157 lb (71.2 kg).  Medication Reconciliation: complete   Johann Beasley CMA      "

## 2017-11-30 NOTE — MR AVS SNAPSHOT
After Visit Summary   11/30/2017    Ely Lott    MRN: [id number]           Patient Information     Date Of Birth          1985        Visit Information        Provider Department      11/30/2017 1:00 PM Kelvin Waddell MD [facility]        Care Instructions                                                        If you have any questions regarding your visit, Please contact your care team.     [facility] Access Services: [phone]    [facility] CLINIC HOURS TELEPHONE NUMBER   Kelvin Waddell M.D.      [place]-    Radha Gusman-MARIAN Wills-Medical Assistant   Monday-[place]  8:00a.m-4:45 p.m  [facility] 8:00[facility]-4:45 p.m.  [facility]  8:00a.m-4:45 p.m.  [facility]  8:00a.m-4:45 p.m. [facility]  [address] [place], [place] [address]Nathaniel  [place] [address] [phone] [facility]  [phone] Fax  Imaging [id number]-[phone]    [facility] Labor and Delivery  [address] [place] [address] Dr.  [place], [place] [address] [phone]    [facility]  [address] [place], [place] [address] [phone] [facility]  [phone] Fax  Imaging [phone]     Urgent Care locations:    [facility] Monday-Friday  5 pm - 9 pm  Saturday and Sunday   9 am - 5 pm    Monday-Friday   11 am - 9 pm  Saturday and Sunday   9 am - 5 pm   [phone]       If you need a medication refill, please contact your pharmacy. Please allow 3 business days for your refill to be completed.  As always, Thank you for trusting us with your healthcare needs!            Follow-ups after your visit        Your next 10 appointments already scheduled     Nov 30, 2017  1:00 PM CST   Post Partum with Kelvin Waddell MD   [facility] ([facility])    [address] [facility] [address]   [phone]               Who to contact     If you have questions or need follow up information about today's clinic visit or your schedule please contact [facility] [place] directly at [phone].  Normal or non-critical lab and imaging results will be communicated to you by MyChart, letter or phone within 4 business days after the clinic has received the results. If you do not hear from us within 7 days, please contact the clinic through MyChart or phone. If you have a critical or abnormal lab result, we will notify you by phone as soon as possible.  Submit refill requests through Idibon or call your pharmacy and they will forward the refill request to us. Please allow 3 business days for your refill to be completed.          Additional Information About Your Visit        [employer] Information     [employer] gives you secure access to your electronic health record. If you see a primary care provider, you can also send messages to your care team and make appointments. If you have questions, please call your primary care clinic.  If you do not have a primary care provider, please call [phone] and they will assist you.        Care EveryWhere ID     This is your Care EveryWhere ID. This could be used by other organizations to access your [place] medical records  [id number]        Your Vitals Were     Pulse Temperature Last Period Breastfeeding? BMI (Body Mass Index)       97 97.1  F (36.2  C) (Oral) 02/13/2017 (Exact Date) Yes 26.13 kg/m2        Blood Pressure from Last 3 Encounters:   11/30/17 114/78   11/15/17 121/88   11/08/17 126/85    Weight from Last 3 Encounters:   11/30/17 157 lb (71.2 kg)   11/15/17 180 lb (81.6 kg)   11/08/17 179 lb (81.2 kg)              Today, you had the following     No orders found for display       Primary Care Provider Office Phone # Fax #    Shazia Coleman [phone]       [address] [address]        Equal Access  to Services     GIOVANNI MTZ : Tucker Bryan, wajayesh guardado, qaangelina gomez. So [facility] [place] [facility] [phone].    ATENCIÓN: Si habla español, tiene a piedra disposición servicios gratuitos de asistencia lingüística. Llame al [phone].    We comply with applicable federal civil rights laws and [place] laws. We do not discriminate on the basis of race, color, national origin, age, disability, sex, sexual orientation, or gender identity.            Thank you!     Thank you for choosing [facility]  for your care. Our goal is always to provide you with excellent care. Hearing back from our patients is one way we can continue to improve our services. Please take a few minutes to complete the written survey that you may receive in the mail after your visit with us. Thank you!             Your Updated Medication List - Protect others around you: Learn how to safely use, store and throw away your medicines at www.disposemymeds.org.          This list is accurate as of: 11/30/17 12:59 PM.  Always use your most recent med list.                   Brand Name Dispense Instructions for use Diagnosis    ibuprofen 600 MG tablet    ADVIL/MOTRIN     Take 600 mg by mouth        oxyCODONE-acetaminophen 5-325 MG per tablet    PERCOCET     Take 1-2 tablets by mouth        prenatal multivitamin plus iron 27-0.8 MG Tabs per tablet      Take 1 tablet by mouth daily

## 2017-12-21 ENCOUNTER — OFFICE VISIT (OUTPATIENT)
Dept: OBGYN | Facility: OTHER | Age: 32
End: 2017-12-21
Payer: COMMERCIAL

## 2017-12-21 VITALS
DIASTOLIC BLOOD PRESSURE: 70 MMHG | BODY MASS INDEX: 27.25 KG/M2 | HEART RATE: 80 BPM | WEIGHT: 163.75 LBS | SYSTOLIC BLOOD PRESSURE: 110 MMHG

## 2017-12-21 PROBLEM — Z34.00 SUPERVISION OF NORMAL FIRST PREGNANCY: Status: RESOLVED | Noted: 2017-04-15 | Resolved: 2017-12-21

## 2017-12-21 PROCEDURE — 99207 ZZC POST PARTUM EXAM: CPT | Performed by: OBSTETRICS & GYNECOLOGY

## 2017-12-21 ASSESSMENT — PATIENT HEALTH QUESTIONNAIRE - PHQ9: SUM OF ALL RESPONSES TO PHQ QUESTIONS 1-9: 2

## 2017-12-21 NOTE — NURSING NOTE
"Chief Complaint   Patient presents with     Post Partum Exam       Initial /70 (BP Location: Right arm, Patient Position: Chair, Cuff Size: Adult Regular)  Pulse 80  Wt 163 lb 12 oz (74.3 kg)  LMP 2017 (Exact Date)  BMI 27.25 kg/m2 Estimated body mass index is 27.25 kg/(m^2) as calculated from the following:    Height as of 3/21/17: 5' 5\" (1.651 m).    Weight as of this encounter: 163 lb 12 oz (74.3 kg).  BP completed using cuff size: regular        The following HM Due: NONE      The following patient reported/Care Every where data was sent to:  P ABSTRACT QUALITY INITIATIVES [82102]       N/a    Lynne Ross CMA  2017           "

## 2017-12-21 NOTE — MR AVS SNAPSHOT
After Visit Summary   12/21/2017    Ely Lott    MRN: [id number]           Patient Information     Date Of Birth          1985        Visit Information        Provider Department      12/21/2017 9:45 AM Samia Tong MD [facility]        Today's Diagnoses     Postpartum care and examination    -  1       Follow-ups after your visit        Follow-up notes from your care team     Return if symptoms worsen or fail to improve.      Who to contact     If you have questions or need follow up information about today's clinic visit or your schedule please contact [facility] directly at [phone].  Normal or non-critical lab and imaging results will be communicated to you by MyChart, letter or phone within 4 business days after the clinic has received the results. If you do not hear from us within 7 days, please contact the clinic through [employer] or phone. If you have a critical or abnormal lab result, we will notify you by phone as soon as possible.  Submit refill requests through [employer] or call your pharmacy and they will forward the refill request to us. Please allow 3 business days for your refill to be completed.          Additional Information About Your Visit        MyChart Information     [employer] gives you secure access to your electronic health record. If you see a primary care provider, you can also send messages to your care team and make appointments. If you have questions, please call your primary care clinic.  If you do not have a primary care provider, please call [phone] and they will assist you.        Care EveryWhere ID     This is your Care EveryWhere ID. This could be used by other organizations to access your [place] medical records  [id number]        Your Vitals Were     Pulse Last Period BMI (Body Mass Index)             80 02/13/2017 (Exact Date) 27.25 kg/m2          Blood Pressure from Last 3 Encounters:   12/21/17 110/70    11/30/17 114/78   11/15/17 121/88    Weight from Last 3 Encounters:   12/21/17 163 lb 12 oz (74.3 kg)   11/30/17 157 lb (71.2 kg)   11/15/17 180 lb (81.6 kg)              Today, you had the following     No orders found for display       Primary Care Provider Office Phone # Fax #    Shazia Coleman [phone]       [address] [address]        Equal Access to Services     [facility] : Hadii aad ku hadasho Soomaali, waaxda luqadaha, qaybta kaalmada adeegyastephenie, angelina sanderson . So [facility] [phone].    ATENCIÓN: Si habla español, tiene a piedra disposición servicios gratuitos de asistencia lingüística. LlSt. [facility] [phone].    We comply with applicable federal civil rights laws and [place] laws. We do not discriminate on the basis of race, color, national origin, age, disability, sex, sexual orientation, or gender identity.            Thank you!     Thank you for choosing [facility]  for your care. Our goal is always to provide you with excellent care. Hearing back from our patients is one way we can continue to improve our services. Please take a few minutes to complete the written survey that you may receive in the mail after your visit with us. Thank you!             Your Updated Medication List - Protect others around you: Learn how to safely use, store and throw away your medicines at www.disposemymeds.org.          This list is accurate as of: 12/21/17 11:00 AM.  Always use your most recent med list.                   Brand Name Dispense Instructions for use Diagnosis    ibuprofen 600 MG tablet    ADVIL/MOTRIN     Take 600 mg by mouth        oxyCODONE-acetaminophen 5-325 MG per tablet    PERCOCET    12 tablet    Take 1-2 tablets by mouth every 6 hours as needed for pain    Postpartum pain       prenatal multivitamin plus iron 27-0.8 MG Tabs per tablet      Take 1 tablet by mouth daily

## 2017-12-21 NOTE — PROGRESS NOTES
SUBJECTIVE:  32 year old  here for a 6-week postpartum checkup.      Patient had an elective induction and delivered vaginally at 39w4d. There was a triple nuchal cord, tight.  Shoulder dystocia and 4th degree laceration.      Patient has been doing well.  She has no vaginal bleeding.  No bladder concerns.  She is having normal BM and normal flatus.  She has some perineal pain, midway between the vagina and the anus.  Otherwise no concerns.         Obstetric History       T1      L1     SAB0   TAB0   Ectopic0   Multiple0   Live Births1       # Outcome Date GA Lbr Ammon/2nd Weight Sex Delivery Anes PTL Lv   1 Term 17 39w4d 28:00 / 03:00 8 lb 4 oz (3.742 kg) M  EPI N ARSALAN      Name: Angel      Complications: Shoulder Dystocia,Fourth degree perineal laceration      Apgar1:  8                Apgar5: 9          Since delivery, she has been breast feeding.  She has not had intercourse since delivery. Patient screened for postpartum depression. Has good support system in place.    Lab Results   Component Value Date    PAP ASC-US 2016    PAP NIL 2013         EXAM:  /70 (BP Location: Right arm, Patient Position: Chair, Cuff Size: Adult Regular)  Pulse 80  Wt 163 lb 12 oz (74.3 kg)  LMP 2017 (Exact Date)  BMI 27.25 kg/m2     General: alert, healthy appearing woman  HEENT: normal  Abdomen: soft, nontender, no palpable masses  Pelvic exam:    External Genitalia:  Normal, no lesions or discharge  Urethral Meatus:  No lesions or discharge, non-inflamed, no prolapse  Bladder:  No tenderness or fullness  Vagina:  Normal mucosa, no abnormal discharge or lesions, non-inflamed.  Repair healing well.    Cervix:  Normal appearance, no lesions or discharge  Uterus: Normal size, non-tender  Adnexa:  No palpable masses or tenderness  Anus and Perineum:  Small 2 mm area at 12 oclock from the anus appears to be granulation tissue.  Repair is intact with no signs of infection.        ASSESSMENT:  Normal postpartum exam  4th degree laceration appears to be healing well.      PLAN:  1. Contraceptive options reviewed; patient wishes to utilize: condoms  2. Continue with annual health maintenance exams.   3. She will monitor her symptoms.  May resume intercourse in two weeks if she is feeling ok. Precautions given.  She will contact us with pain, abnormal discharge, abnormal bowel function, or dyspareunia.  We will have her come in for an exam if needed, then consider pelvic floor physical therapy.  She prefers this in [facility] if that is needed.  All questions answered and patient is comfortable with plan.   4.  She will consider primary  with her next baby.  Discussed optimal pregnancy interval.     Samia Tong MD

## 2018-01-04 ENCOUNTER — PRENATAL OFFICE VISIT (OUTPATIENT)
Dept: OBGYN | Facility: CLINIC | Age: 33
End: 2018-01-04
Payer: COMMERCIAL

## 2018-01-04 VITALS
HEART RATE: 85 BPM | BODY MASS INDEX: 25.29 KG/M2 | SYSTOLIC BLOOD PRESSURE: 115 MMHG | WEIGHT: 152 LBS | DIASTOLIC BLOOD PRESSURE: 78 MMHG

## 2018-01-04 DIAGNOSIS — L92.9 ABNORMAL GRANULATION TISSUE: Primary | ICD-10-CM

## 2018-01-04 PROCEDURE — 99024 POSTOP FOLLOW-UP VISIT: CPT | Performed by: OBSTETRICS & GYNECOLOGY

## 2018-01-04 NOTE — MR AVS SNAPSHOT
After Visit Summary   1/4/2018    Ely Lott    MRN: [id number]           Patient Information     Date Of Birth          1985        Visit Information        Provider Department      1/4/2018 1:00 PM Kelvin Waddell MD [facility]        Care Instructions                                                        If you have any questions regarding your visit, Please contact your care team.     [employer] Access Services: [phone]    [facility] CLINIC HOURS TELEPHONE NUMBER       DON Murrieta-      Duncan Wills-Medical Assistant       Monday-[place]  8:00a.m-4:45 p.m  Wednesday-[place] 8:00a.m-4:45 p.m.  Thursday-Normal  8:00a.m-4:45 p.m.  Friday-Normal  8:00a.m-4:45 p.m. [facility]  [address] [address] [place] [address] [place] [address]  [phone] ask for [facility]  [phone] Fax  Imaging [phone]    [facility] Labor and Delivery  [address] [place] [address] Dr.  [place], [place] [address] [phone]    [facility]  [address] [place] [address] [phone] [facility]  [phone] Fax  Imaging [phone]     Urgent Care locations:    [place]        [place] Monday-Friday  5 pm - 9 pm  Saturday and Sunday   9 am - 5 pm    Monday-Friday   11 am - 9 pm  Saturday and Sunday   9 am - 5 pm   [phone]       If you need a medication refill, please contact your pharmacy. Please allow 3 business days for your refill to be completed.  As always, Thank you for trusting us with your healthcare needs!            Follow-ups after your visit        Who to contact     If you have questions or need follow up information about today's clinic visit or your schedule please contact [facility] directly at [phone].  Normal or non-critical lab and imaging results will be communicated to you by [employer],  letter or phone within 4 business days after the clinic has received the results. If you do not hear from us within 7 days, please contact the clinic through [employer] or phone. If you have a critical or abnormal lab result, we will notify you by phone as soon as possible.  Submit refill requests through [employer] or call your pharmacy and they will forward the refill request to us. Please allow 3 business days for your refill to be completed.          Additional Information About Your Visit        [employer] Information     [employer] gives you secure access to your electronic health record. If you see a primary care provider, you can also send messages to your care team and make appointments. If you have questions, please call your primary care clinic.  If you do not have a primary care provider, please call [phone] and they will assist you.        Care EveryWhere ID     This is your Care EveryWhere ID. This could be used by other organizations to access your [place] medical records  [id number]        Your Vitals Were     Pulse Breastfeeding? BMI (Body Mass Index)             85 Yes 25.29 kg/m2          Blood Pressure from Last 3 Encounters:   01/04/18 115/78   12/21/17 110/70   11/30/17 114/78    Weight from Last 3 Encounters:   01/04/18 152 lb (68.9 kg)   12/21/17 163 lb 12 oz (74.3 kg)   11/30/17 157 lb (71.2 kg)              Today, you had the following     No orders found for display       Primary Care Provider Office Phone # Fax #    Shazia Coleman [phone]       [address] [address]        Equal Access to Services     [facility]: Hadii aad ku hadasho Soomaali, waaxda luqadaha, qaybta kaalmada carlos, angelina diego. So [facility] [phone].    ATENCIÓN: Si habla español, tiene a piedra disposición servicios gratuitos de asistencia lingüística. Llame al [phone].    We comply with applicable federal civil  rights laws and [place] laws. We do not discriminate on the basis of race, color, national origin, age, disability, sex, sexual orientation, or gender identity.            Thank you!     Thank you for choosing [facility]  for your care. Our goal is always to provide you with excellent care. Hearing back from our patients is one way we can continue to improve our services. Please take a few minutes to complete the written survey that you may receive in the mail after your visit with us. Thank you!             Your Updated Medication List - Protect others around you: Learn how to safely use, store and throw away your medicines at www.disposemymeds.org.          This list is accurate as of: 1/4/18  1:10 PM.  Always use your most recent med list.                   Brand Name Dispense Instructions for use Diagnosis    ibuprofen 600 MG tablet    ADVIL/MOTRIN     Take 600 mg by mouth        oxyCODONE-acetaminophen 5-325 MG per tablet    PERCOCET    12 tablet    Take 1-2 tablets by mouth every 6 hours as needed for pain    Postpartum pain       prenatal multivitamin plus iron 27-0.8 MG Tabs per tablet      Take 1 tablet by mouth daily

## 2018-01-04 NOTE — PATIENT INSTRUCTIONS
If you have any questions regarding your visit, Please contact your care team.     [employer] Access Services: [phone]    [facility] CLINIC HOURS TELEPHONE NUMBER       DON Murrieta-      Duncan Wills-Medical Assistant       Monday-[place]  8:00a.m-4:45 p.m  Wednesday-[place] 8:00a.m-4:45 p.m.  Thursday-[place]  8:00a.m-4:45 p.m.  Friday-[place]  8:00a.m-4:45 p.m. [facility]  [address] [place] [address]  [phone] ask [facility]  [phone] Fax  Imaging [phone]    [facility] Labor and Delivery  [address] [place] [address] [place] [address]  [phone]    [facility]  [address] [place] [address]  [phone] [facility]  [phone] Fax  Imaging [phone]     Urgent Care locations:    [place]        [place] Monday-Friday  5 pm - 9 pm  Saturday and Sunday   9 am - 5 pm    Monday-Friday   11 am - 9 pm  Saturday and Sunday   9 am - 5 pm   [phone]       If you need a medication refill, please contact your pharmacy. Please allow 3 business days for your refill to be completed.  As always, Thank you for trusting us with your healthcare needs!

## 2018-01-04 NOTE — NURSING NOTE
"Chief Complaint   Patient presents with     Post Partum Exam     Follow-up 4th degree laceration       Initial /78 (BP Location: Left arm, Patient Position: Chair, Cuff Size: Adult Regular)  Pulse 85  Wt 152 lb (68.9 kg)  Breastfeeding? Yes  BMI 25.29 kg/m2 Estimated body mass index is 25.29 kg/(m^2) as calculated from the following:    Height as of 3/21/17: 5' 5\" (1.651 m).    Weight as of this encounter: 152 lb (68.9 kg).  Medication Reconciliation: complete   Johann Beasley CMA      "

## 2018-01-05 NOTE — PROGRESS NOTES
"OB-GYN Problem-Oriented Visit or Consultation      Ely Lott is a 32 year old year old P 1 who presents with a chief complaint of persistent focal pain above anus at 4th degree laceration repair and concerned if it may have torn more. Normal GI/ function and control. Breast feeding with some supplementation and son here- doing well. Dyspareunia x 1 and abandoned. No LMP recorded. Patient is not currently having periods (Reason: Postpartum).     Past medical, obstetrical, surgical, family and social history reviewed and as noted or updated in chart.     Allergies, meds and supplements are as noted or updated in chart.      ROS:   Systems reviewed include:  constitutional, breast, gastrointestinal, genitourinary, integumentary, psychological, hematologic/lymphatic and endocrine.    These systems were negative for significant symptoms except for the following additional: none; see HPI.    EXAM:  VS as noted. /78 (BP Location: Left arm, Patient Position: Chair, Cuff Size: Adult Regular)  Pulse 85  Wt 152 lb (68.9 kg)  Breastfeeding? Yes  BMI 25.29 kg/m2  Constitutionally normal.       Pelvis was  normal or negative except for, or in particular noting, the following  pertinent findings: vulva normal with intact repair and no sign of fistula, focal tender granulation tissue at \"12\" of anus and extending slightly inward. After discussion, this was treated with topical silver nitrate.     Assessment:   Encounter Diagnoses   Name Primary?     Abnormal granulation tissue Yes       I reviewed the condition, causes, differential diagnosis, prognosis, evaluation and management considerations and options.  Questions answered and information given. See orders.         Plan and Recommendations: Continue sitz baths and protective care. Recheck in 2-3 weeks with possible repeat treatment then.     A/P:  Ely was seen today for post partum exam.    Diagnoses and all orders for this visit:    Abnormal granulation " tissue  -     CHEM CAUTERY GRANULATION TISSUE        Kelvin Waddell MD

## 2018-01-17 ENCOUNTER — PRENATAL OFFICE VISIT (OUTPATIENT)
Dept: OBGYN | Facility: CLINIC | Age: 33
End: 2018-01-17
Payer: COMMERCIAL

## 2018-01-17 VITALS
SYSTOLIC BLOOD PRESSURE: 129 MMHG | HEART RATE: 84 BPM | WEIGHT: 153 LBS | DIASTOLIC BLOOD PRESSURE: 84 MMHG | BODY MASS INDEX: 25.46 KG/M2 | TEMPERATURE: 97 F

## 2018-01-17 DIAGNOSIS — L92.9 ABNORMAL GRANULATION TISSUE: Primary | ICD-10-CM

## 2018-01-17 PROCEDURE — 99024 POSTOP FOLLOW-UP VISIT: CPT | Performed by: OBSTETRICS & GYNECOLOGY

## 2018-01-17 NOTE — PROGRESS NOTES
"Ely Lott is a 32 year old year old who is here today for a recheck of lower perineum/anterior anus granulation tissue.  See prior notes.     Significant interval changes: none.  No signif signs, symptoms or concerns otherwise. BMs normal. Feels less tender. Here with son- doing well.    Past medical, obstetrical, surgical, family and social history reviewed and as noted or updated in chart.      Exam: /84 (BP Location: Left arm, Patient Position: Chair, Cuff Size: Adult Regular)  Pulse 84  Temp 97  F (36.1  C) (Oral)  Wt 153 lb (69.4 kg)  Breastfeeding? Yes  BMI 25.46 kg/m2 Constitutionally normal. Focal tender granulation tissue at \"12\" of anus is less than before with focal tenderness. After discussion, this was treated again with topical silver nitrate.     A/P: Satisfactory progress. Observe for 4 weeks and continue same care. RTC prn.     Ely was seen today for post partum exam.    Diagnoses and all orders for this visit:    Abnormal granulation tissue  -     CHEM CAUTERY GRANULATION TISSUE        Kelvin Waddell MD       "

## 2018-01-17 NOTE — PATIENT INSTRUCTIONS
If you have any questions regarding your visit, Please contact your care team.     [employer] Access Services: [phone]    [facility] CLINIC HOURS TELEPHONE NUMBER       DON Murrieta-      Duncan Wills-Medical Assistant       Monday-[place]  8:00a.m-4:45 p.m  Wednesday-[place] 8:00a.m-4:45 p.m.  Thursday-[place]  8:00a.m-4:45 p.m.  Friday-[place]  8:00a.m-4:45 p.m. [facility]  [address] [place] [address]  [phone] ask [facility]  [phone] Fax  Imaging [phone]    [facility] Labor and Delivery  [address] [place] [address] [place] [address]  [phone]    [facility]  [address] [place] [address]  [phone] [facility]  [phone] Fax  Imaging [phone]     Urgent Care locations:    [place]        [place] Monday-Friday  5 pm - 9 pm  Saturday and Sunday   9 am - 5 pm    Monday-Friday   11 am - 9 pm  Saturday and Sunday   9 am - 5 pm   [phone]       If you need a medication refill, please contact your pharmacy. Please allow 3 business days for your refill to be completed.  As always, Thank you for trusting us with your healthcare needs!

## 2018-01-17 NOTE — NURSING NOTE
"Chief Complaint   Patient presents with     Post Partum Exam     Follow-up treatment for 4th degree laceration       Initial /84 (BP Location: Left arm, Patient Position: Chair, Cuff Size: Adult Regular)  Pulse 84  Temp 97  F (36.1  C) (Oral)  Wt 153 lb (69.4 kg)  Breastfeeding? Yes  BMI 25.46 kg/m2 Estimated body mass index is 25.46 kg/(m^2) as calculated from the following:    Height as of 3/21/17: 5' 5\" (1.651 m).    Weight as of this encounter: 153 lb (69.4 kg).  Medication Reconciliation: complete   Johann Beasley CMA      "

## 2018-01-17 NOTE — MR AVS SNAPSHOT
After Visit Summary   1/17/2018    Ely Lott    MRN: [id number]           Patient Information     Date Of Birth          1985        Visit Information        Provider Department      1/17/2018 2:30 PM Kelvin Waddell MD [facility]        Care Instructions                                                        If you have any questions regarding your visit, Please contact your care team.     [employer] Access Services: [phone]    [facility] CLINIC HOURS TELEPHONE NUMBER       DON Murrieta-      Duncan Wills-Medical Assistant       Monday-[place]  8:00a.m-4:45 p.m  Wednesday-[place] 8:00a.m-4:45 p.m.  Thursday-[place]  8:00a.m-4:45 p.m.  Friday-[place]  8:00a.m-4:45 p.m. [facility]  [address] [place] [address] [place] [address]  [phone] ask for [facility]  [phone] Fax  Imaging [phone]    [facility] Labor and Delivery  [address] [place] [address] Dr.  [place], [place] [address] [phone]    [facility]  [address] [place] [address] [phone] [facility]  [phone] Fax  Imaging [phone]     Urgent Care locations:    [place]        [place] Monday-Friday  5 pm - 9 pm  Saturday and Sunday   9 am - 5 pm    Monday-Friday   11 am - 9 pm  Saturday and Sunday   9 am - 5 pm   [phone]       If you need a medication refill, please contact your pharmacy. Please allow 3 business days for your refill to be completed.  As always, Thank you for trusting us with your healthcare needs!            Follow-ups after your visit        Who to contact     If you have questions or need follow up information about today's clinic visit or your schedule please contact [facility] directly at [phone].  Normal or non-critical lab and imaging results will be communicated to you by  MyChart, letter or phone within 4 business days after the clinic has received the results. If you do not hear from us within 7 days, please contact the clinic through [employer] or phone. If you have a critical or abnormal lab result, we will notify you by phone as soon as possible.  Submit refill requests through [employer] or call your pharmacy and they will forward the refill request to us. Please allow 3 business days for your refill to be completed.          Additional Information About Your Visit        [employer] Information     [employer] gives you secure access to your electronic health record. If you see a primary care provider, you can also send messages to your care team and make appointments. If you have questions, please call your primary care clinic.  If you do not have a primary care provider, please call [phone] and they will assist you.        Care EveryWhere ID     This is your Care EveryWhere ID. This could be used by other organizations to access your [place] medical records  [id number]        Your Vitals Were     Pulse Temperature Breastfeeding? BMI (Body Mass Index)          84 97  F (36.1  C) (Oral) Yes 25.46 kg/m2         Blood Pressure from Last 3 Encounters:   01/17/18 129/84   01/04/18 115/78   12/21/17 110/70    Weight from Last 3 Encounters:   01/17/18 153 lb (69.4 kg)   01/04/18 152 lb (68.9 kg)   12/21/17 163 lb 12 oz (74.3 kg)              Today, you had the following     No orders found for display       Primary Care Provider Office Phone # Fax #    Shazia Coleman [phone]       [address] [address]        Equal Access to Services     GIOVANNI MTZ : Hadii naomi Bryan, wajayesh luzainab, qaybta kaalangelina ace. So [facility] [phone].    ATENCIÓN: Si habla español, tiene a piedra disposición servicios gratuitos de asistencia lingüística. Llame al  [phone].    We comply with applicable federal civil rights laws and [place] laws. We do not discriminate on the basis of race, color, national origin, age, disability, sex, sexual orientation, or gender identity.            Thank you!     Thank you for choosing [facility]  for your care. Our goal is always to provide you with excellent care. Hearing back from our patients is one way we can continue to improve our services. Please take a few minutes to complete the written survey that you may receive in the mail after your visit with us. Thank you!             Your Updated Medication List - Protect others around you: Learn how to safely use, store and throw away your medicines at www.disposemymeds.org.          This list is accurate as of: 1/17/18  2:34 PM.  Always use your most recent med list.                   Brand Name Dispense Instructions for use Diagnosis    prenatal multivitamin plus iron 27-0.8 MG Tabs per tablet      Take 1 tablet by mouth daily

## 2018-05-22 ENCOUNTER — OFFICE VISIT (OUTPATIENT)
Dept: FAMILY MEDICINE | Facility: CLINIC | Age: 33
End: 2018-05-22
Payer: COMMERCIAL

## 2018-05-22 VITALS
HEART RATE: 94 BPM | OXYGEN SATURATION: 97 % | DIASTOLIC BLOOD PRESSURE: 80 MMHG | WEIGHT: 145 LBS | TEMPERATURE: 97.7 F | SYSTOLIC BLOOD PRESSURE: 130 MMHG | HEIGHT: 65 IN | BODY MASS INDEX: 24.16 KG/M2

## 2018-05-22 DIAGNOSIS — Z36.87 UNSURE OF LMP (LAST MENSTRUAL PERIOD) AS REASON FOR ULTRASOUND SCAN: ICD-10-CM

## 2018-05-22 DIAGNOSIS — Z32.01 PREGNANCY EXAMINATION OR TEST, POSITIVE RESULT: Primary | ICD-10-CM

## 2018-05-22 DIAGNOSIS — N91.2 ABSENCE OF MENSTRUATION: ICD-10-CM

## 2018-05-22 LAB — BETA HCG QUAL IFA URINE: POSITIVE

## 2018-05-22 PROCEDURE — 84703 CHORIONIC GONADOTROPIN ASSAY: CPT | Performed by: PREVENTIVE MEDICINE

## 2018-05-22 PROCEDURE — 99213 OFFICE O/P EST LOW 20 MIN: CPT | Performed by: PREVENTIVE MEDICINE

## 2018-05-22 ASSESSMENT — PATIENT HEALTH QUESTIONNAIRE - PHQ9: 5. POOR APPETITE OR OVEREATING: NOT AT ALL

## 2018-05-22 ASSESSMENT — PAIN SCALES - GENERAL: PAINLEVEL: NO PAIN (0)

## 2018-05-22 ASSESSMENT — ANXIETY QUESTIONNAIRES
5. BEING SO RESTLESS THAT IT IS HARD TO SIT STILL: NOT AT ALL
6. BECOMING EASILY ANNOYED OR IRRITABLE: SEVERAL DAYS
2. NOT BEING ABLE TO STOP OR CONTROL WORRYING: NOT AT ALL
IF YOU CHECKED OFF ANY PROBLEMS ON THIS QUESTIONNAIRE, HOW DIFFICULT HAVE THESE PROBLEMS MADE IT FOR YOU TO DO YOUR WORK, TAKE CARE OF THINGS AT HOME, OR GET ALONG WITH OTHER PEOPLE: NOT DIFFICULT AT ALL
7. FEELING AFRAID AS IF SOMETHING AWFUL MIGHT HAPPEN: NOT AT ALL
3. WORRYING TOO MUCH ABOUT DIFFERENT THINGS: SEVERAL DAYS
1. FEELING NERVOUS, ANXIOUS, OR ON EDGE: NOT AT ALL
GAD7 TOTAL SCORE: 2

## 2018-05-22 NOTE — PATIENT INSTRUCTIONS
At [facility], we strive to deliver an exceptional experience to you, every time we see you.  If you receive a survey in the mail, please send us back your thoughts. We really do value your feedback.    Based on your medical history, these are the current health maintenance/preventive care services that you are due for (some may have been done at this visit.)  Health Maintenance Due   Topic Date Due     URINE DRUG SCREEN Q1 YR  11/11/2001     SILVIA QUESTIONNAIRE 1 YEAR  11/11/2004       Suggested websites for health information:  Www.Pica8.org : Up to date and easily searchable information on multiple topics.  Www.Correlor.gov : medication info, interactive tutorials, watch real surgeries online  Www.familydoctor.org : good info from the Academy of Family Physicians  Www.cdc.gov : public health info, travel advisories, epidemics (H1N1)  Www.aap.org : children's health info, normal development, vaccinations  Www.health.[facility].[place].us : [place] dept of health, public health issues in [place], N1N1    Your care team:                            Family Medicine Internal Medicine   MD Lavelle Soler MD Shantel Branch-Fleming, MD Katya Georgiev PA-C Megan Hill, APRN CNP    Smith Yuan MD Pediatrics   Ashwin Smalls, PAFLOR Gallo, MD Munira Banks APRN CNP   MD Shwetha Marlow MD Deborah Mielke, MD Kim Thein, APRN [facility]      Clinic hours: Monday - Thursday 7 am-7 pm; Fridays 7 am-5 pm.   Urgent care: Monday - Friday 11 am-9 pm; Saturday and Sunday 9 am-5 pm.  Pharmacy : Monday -Thursday 8 am-8 pm; Friday 8 am-6 pm; Saturday and Sunday 9 am-5 pm.     Clinic: [phone]   Pharmacy: [phone]

## 2018-05-22 NOTE — MR AVS SNAPSHOT
After Visit Summary   5/22/2018    Ely Lott    MRN: [id number]           Patient Information     Date Of Birth          1985        Visit Information        Provider Department      5/22/2018 2:00 PM Sosa Coleman MD [facility]        Today's Diagnoses     Pregnancy examination or test, positive result    -  1    Absence of menstruation        Unsure of LMP (last menstrual period) as reason for ultrasound scan          Care Instructions    At [facility], we strive to deliver an exceptional experience to you, every time we see you.  If you receive a survey in the mail, please send us back your thoughts. We really do value your feedback.    Based on your medical history, these are the current health maintenance/preventive care services that you are due for (some may have been done at this visit.)  Health Maintenance Due   Topic Date Due     URINE DRUG SCREEN Q1 YR  11/11/2001     SILVIA QUESTIONNAIRE 1 YEAR  11/11/2004       Suggested websites for health information:  Www.Zipments : Up to date and easily searchable information on multiple topics.  Www.medlineplus.gov : medication info, interactive tutorials, watch real surgeries online  Www.familydoctor.org : good info from the Academy of Family Physicians  Www.cdc.gov : public health info, travel advisories, epidemics (H1N1)  Www.aap.org : children's health info, normal development, vaccinations  Www.health.state[place] : [place] dept of health, public health issues in [place], N1N1    Your care team:                            Family Medicine Internal Medicine   MD Lavelle Soler MD Shantel Branch-Fleming, MD Katya Georgiev PA-C Megan Hill, APRN STERLING Yuan MD Pediatrics   RE Alanis, MD Munira Banks APRN CNP   MD Shwetha Marlow MD Deborah Mielke, MD Kim Thein, APRN [facility]      Clinic hours: Monday - Thursday 7 am-7 pm;  Fridays 7 am-5 pm.   Urgent care: Monday - Friday 11 am-9 pm; Saturday and Sunday 9 am-5 pm.  Pharmacy : Monday -Thursday 8 am-8 pm; Friday 8 am-6 pm; Saturday and Sunday 9 am-5 pm.     Clinic: [phone]   Pharmacy: [phone]              Follow-ups after your visit        Additional Services     OB/GYN REFERRAL       Your provider has referred you to:  [facility]: [facility] [phone]   [email or web address]/    Please be aware that coverage of these services is subject to the terms and limitations of your health insurance plan.  Call member services at your health plan with any benefit or coverage questions.      Please bring the following with you to your appointment:    (1) Any X-Rays, CTs or MRIs which have been performed.  Contact the facility where they were done to arrange for  prior to your scheduled appointment.   (2) List of current medications   (3) This referral request   (4) Any documents/labs given to you for this referral                  Follow-up notes from your care team     Return for Establish with OB GYN.      Future tests that were ordered for you today     Open Future Orders        Priority Expected Expires Ordered    US OB < 14 Weeks Single Routine  5/22/2019 5/22/2018            Who to contact     If you have questions or need follow up information about today's clinic visit or your schedule please contact [facility] directly at [phone].  Normal or non-critical lab and imaging results will be communicated to you by MyChart, letter or phone within 4 business days after the clinic has received the results. If you do not hear from us within 7 days, please contact the clinic through MyChart or phone. If you have a critical or abnormal lab result, we will notify you by phone as soon as possible.  Submit refill requests through YUPPTV or call your pharmacy and they will forward the refill request to  "us. Please allow 3 business days for your refill to be completed.          Additional Information About Your Visit        MyChart Information     [employer]ap[employer] gives you secure access to your electronic health record. If you see a primary care provider, you can also send messages to your care team and make appointments. If you have questions, please call your primary care clinic.  If you do not have a primary care provider, please call [phone] and they will assist you.        Care EveryWhere ID     This is your Care EveryWhere ID. This could be used by other organizations to access your [place] medical records  [id number]        Your Vitals Were     Pulse Temperature Height Pulse Oximetry Breastfeeding? BMI (Body Mass Index)    94 97.7  F (36.5  C) (Oral) 5' 5\" (1.651 m) 97% No 24.13 kg/m2       Blood Pressure from Last 3 Encounters:   05/22/18 130/80   01/17/18 129/84   01/04/18 115/78    Weight from Last 3 Encounters:   05/22/18 145 lb (65.8 kg)   01/17/18 153 lb (69.4 kg)   01/04/18 152 lb (68.9 kg)              We Performed the Following     Beta HCG Qual, Urine - [facility] and [place] ([id number])     OB/GYN REFERRAL        Primary Care Provider Office Phone # Fax #    [facility] [phone]       [address]  [facility] [address]        Equal Access to Services     GIOVANNI MTZ : Hadii aad ku hadasho Soomaali, waaxda luqadaha, qaybta kaalmada adeegyada, waxay idiin hayaan adecelia kharakelsey sanderson . So [facility] [phone].    ATENCIÓN: Si habla español, tiene a piedra disposición servicios gratuitos de asistencia lingüística. Llame al [phone].    We comply with applicable federal civil rights laws and [place] laws. We do not discriminate on the basis of race, color, national origin, age, disability, sex, sexual orientation, or gender identity.            Thank you!     Thank you for choosing [facility]  for your care. Our goal is always to provide you with " excellent care. Hearing back from our patients is one way we can continue to improve our services. Please take a few minutes to complete the written survey that you may receive in the mail after your visit with us. Thank you!             Your Updated Medication List - Protect others around you: Learn how to safely use, store and throw away your medicines at www.disposemymeds.org.          This list is accurate as of 5/22/18  2:47 PM.  Always use your most recent med list.                   Brand Name Dispense Instructions for use Diagnosis    prenatal multivitamin plus iron 27-0.8 MG Tabs per tablet      Take 1 tablet by mouth daily

## 2018-05-22 NOTE — PROGRESS NOTES
"  SUBJECTIVE:   Ely Lott is a 32 year old female who presents to clinic today for the following health issues:    Concern: Confirmation of pregnancy    Here for confirmation of pregnancy.  No abdominal pain, no dysuria or frequency, no vaginal spotting or bleeding.  Has had nausea but no emesis.  Taking prenatal vitamins.  No use of tobacco or alcohol. Needs referral to OB/GYN.     Has not been menstruating since breast feeding, stopped 2-3 weeks ago.   No contraception   , did have a 4th degree laceration   LMP unknown    Problem list and histories reviewed & adjusted, as indicated.  Additional history: as documented    Patient Active Problem List   Diagnosis     ASCUS favor benign     Chronic neck pain     Past Surgical History:   Procedure Laterality Date     IR RHIZOTOMY  ,        Social History   Substance Use Topics     Smoking status: Never Smoker     Smokeless tobacco: Never Used     Alcohol use Yes     Family History   Problem Relation Age of Onset     DIABETES Father          Current Outpatient Prescriptions   Medication Sig Dispense Refill     Prenatal Vit-Fe Fumarate-FA (PRENATAL MULTIVITAMIN  PLUS IRON) 27-0.8 MG TABS per tablet Take 1 tablet by mouth daily       Allergies   Allergen Reactions     Penicillins      BP Readings from Last 3 Encounters:   18 130/80   18 129/84   18 115/78    Wt Readings from Last 3 Encounters:   18 145 lb (65.8 kg)   18 153 lb (69.4 kg)   18 152 lb (68.9 kg)                  Labs reviewed in EPIC    Reviewed and updated as needed this visit by clinical staff       Reviewed and updated as needed this visit by Provider         ROS:  Constitutional, HEENT, cardiovascular, pulmonary, gi and gu systems are negative, except as otherwise noted.    OBJECTIVE:                                                    /80  Pulse 94  Temp 97.7  F (36.5  C) (Oral)  Ht 5' 5\" (1.651 m)  Wt 145 lb (65.8 kg)  SpO2 97%  " Breastfeeding? No  BMI 24.13 kg/m2  Body mass index is 24.13 kg/(m^2).  GENERAL APPEARANCE: healthy, alert and no distress  EYES: Eyes grossly normal to inspection and conjunctivae and sclerae normal  NECK: trachea midline and normal to palpation  RESP: lungs clear to auscultation - no rales, rhonchi or wheezes  CV: regular rates and rhythm, normal S1 S2, no S3 or S4 and no murmur, click or rub  ABDOMEN: soft, non-tender and no rebound or guarding   MS: extremities normal- no gross deformities noted  SKIN: no suspicious lesions or rashes  NEURO: Normal strength and tone, mentation intact and speech normal  PSYCH: mentation appears normal and affect normal/bright    Diagnostic test results:  Diagnostic Test Results:  Results for orders placed or performed in visit on 18 (from the past 24 hour(s))   Beta HCG Qual, Urine - [facility] and [place] ([id number])   Result Value Ref Range    Beta HCG Qual IFA Urine Positive (A) NEG^Negative           ASSESSMENT/PLAN:                                                    1. Pregnancy examination or test, positive result  -  - US OB < 14 Weeks Single; Future  - OB/GYN REFERRAL    2. Absence of menstruation  -Positive pregnancy test results discussed with patient   - Beta HCG Qual, Urine - [facility] and [place] ([id number])    3. Unsure of LMP (last menstrual period) as reason for ultrasound scan  -Unsure of LMP  -Await results to determine MYLES and gestational age   - US OB < 14 Weeks Single; Future      Follow up with Provider - establish with OB GYN     Sosa Coleman MD MPH    [facility]

## 2018-05-23 ENCOUNTER — RADIANT APPOINTMENT (OUTPATIENT)
Dept: ULTRASOUND IMAGING | Facility: CLINIC | Age: 33
End: 2018-05-23
Payer: COMMERCIAL

## 2018-05-23 DIAGNOSIS — Z36.87 UNSURE OF LMP (LAST MENSTRUAL PERIOD) AS REASON FOR ULTRASOUND SCAN: ICD-10-CM

## 2018-05-23 DIAGNOSIS — Z32.01 PREGNANCY EXAMINATION OR TEST, POSITIVE RESULT: ICD-10-CM

## 2018-05-23 PROCEDURE — 76801 OB US < 14 WKS SINGLE FETUS: CPT

## 2018-05-23 ASSESSMENT — ANXIETY QUESTIONNAIRES: GAD7 TOTAL SCORE: 2

## 2018-05-23 ASSESSMENT — PATIENT HEALTH QUESTIONNAIRE - PHQ9: SUM OF ALL RESPONSES TO PHQ QUESTIONS 1-9: 4

## 2018-05-23 NOTE — PROGRESS NOTES
Ely,    Ultrasound of the pelvis showed a sac in the uterus corresponding to 5 weeks and 1 day. It is still very early on to see a fetus or heartbeat. Please follow up with Dr. Waddell.    Please do not hesitate to call us at [phone] if you have any questions or concerns.    Thank you,    Sosa Coleman MD MPH

## 2018-05-31 ENCOUNTER — TELEPHONE (OUTPATIENT)
Dept: FAMILY MEDICINE | Facility: CLINIC | Age: 33
End: 2018-05-31

## 2018-05-31 DIAGNOSIS — Z36.87 UNSURE OF LMP (LAST MENSTRUAL PERIOD) AS REASON FOR ULTRASOUND SCAN: Primary | ICD-10-CM

## 2018-05-31 NOTE — TELEPHONE ENCOUNTER
Reason for Call: Request for an orderl:    Order or referral being requested: Wants another Preg US     Date needed: as soon as possible    Has the patient been seen by the PCP for this problem? YES    Additional comments: please call and advise when approved so she can schedule this test    Phone number Patient can be reached at:  Cell number on file:    Telephone Information:   Mobile [phone]     Best Time:  any    Can we leave a detailed message on this number?  YES    Call taken on 5/31/2018 at 2:26 PM by Sara Zepeda

## 2018-06-01 NOTE — TELEPHONE ENCOUNTER
I do not see any OB GYN appointments coming up, has the patient scheduled anything with them as yet? I went ahead and placed US orders in the meantime.  Thanks,  Sosa Coleman MD MPH

## 2018-06-01 NOTE — TELEPHONE ENCOUNTER
This writer attempted to contact patient on 06/01/18      Reason for call US OB and left detailed message.      If patient calls back:  Patient contacted by 2nd floor [place] Care Team (MA/TC). Inform patient that someone from the team will contact them, document that pt called and route to care team.         Carmel English MA\

## 2018-06-12 ENCOUNTER — RADIANT APPOINTMENT (OUTPATIENT)
Dept: ULTRASOUND IMAGING | Facility: CLINIC | Age: 33
End: 2018-06-12
Payer: COMMERCIAL

## 2018-06-12 DIAGNOSIS — Z36.87 UNSURE OF LMP (LAST MENSTRUAL PERIOD) AS REASON FOR ULTRASOUND SCAN: ICD-10-CM

## 2018-06-12 PROCEDURE — 76801 OB US < 14 WKS SINGLE FETUS: CPT

## 2018-06-12 NOTE — PROGRESS NOTES
Ely,     Ultrasound showed a live pregnancy at 8 weeks 2 days. Two small areas of bleeding noted. Sometimes, these areas of bleeding can indicate increased risk of miscarriage. This will be monitored by your OB GYN doctor. Please make sure that you are scheduled for pre arian care.     Please do not hesitate to call us at [phone] if you have any questions or concerns.    Thank you,    Sosa Coleman MD MPH

## 2018-07-05 ENCOUNTER — PRENATAL OFFICE VISIT (OUTPATIENT)
Dept: OBGYN | Facility: CLINIC | Age: 33
End: 2018-07-05
Payer: COMMERCIAL

## 2018-07-05 VITALS
WEIGHT: 144 LBS | OXYGEN SATURATION: 99 % | DIASTOLIC BLOOD PRESSURE: 78 MMHG | HEART RATE: 88 BPM | BODY MASS INDEX: 23.96 KG/M2 | SYSTOLIC BLOOD PRESSURE: 121 MMHG

## 2018-07-05 DIAGNOSIS — Z34.80 SUPERVISION OF OTHER NORMAL PREGNANCY, ANTEPARTUM: ICD-10-CM

## 2018-07-05 DIAGNOSIS — Z34.80 PRENATAL CARE, SUBSEQUENT PREGNANCY, UNSPECIFIED TRIMESTER: Primary | ICD-10-CM

## 2018-07-05 LAB
ERYTHROCYTE [DISTWIDTH] IN BLOOD BY AUTOMATED COUNT: 12.2 % (ref 10–15)
HCT VFR BLD AUTO: 37.8 % (ref 35–47)
HGB BLD-MCNC: 13 G/DL (ref 11.7–15.7)
MCH RBC QN AUTO: 30.6 PG (ref 26.5–33)
MCHC RBC AUTO-ENTMCNC: 34.4 G/DL (ref 31.5–36.5)
MCV RBC AUTO: 89 FL (ref 78–100)
PLATELET # BLD AUTO: 224 10E9/L (ref 150–450)
RBC # BLD AUTO: 4.25 10E12/L (ref 3.8–5.2)
WBC # BLD AUTO: 9.5 10E9/L (ref 4–11)

## 2018-07-05 PROCEDURE — 85027 COMPLETE CBC AUTOMATED: CPT | Performed by: OBSTETRICS & GYNECOLOGY

## 2018-07-05 PROCEDURE — 87340 HEPATITIS B SURFACE AG IA: CPT | Performed by: OBSTETRICS & GYNECOLOGY

## 2018-07-05 PROCEDURE — 86901 BLOOD TYPING SEROLOGIC RH(D): CPT | Performed by: OBSTETRICS & GYNECOLOGY

## 2018-07-05 PROCEDURE — 86762 RUBELLA ANTIBODY: CPT | Performed by: OBSTETRICS & GYNECOLOGY

## 2018-07-05 PROCEDURE — 86850 RBC ANTIBODY SCREEN: CPT | Performed by: OBSTETRICS & GYNECOLOGY

## 2018-07-05 PROCEDURE — 87389 HIV-1 AG W/HIV-1&-2 AB AG IA: CPT | Performed by: OBSTETRICS & GYNECOLOGY

## 2018-07-05 PROCEDURE — 36415 COLL VENOUS BLD VENIPUNCTURE: CPT | Performed by: OBSTETRICS & GYNECOLOGY

## 2018-07-05 PROCEDURE — 99207 ZZC FIRST OB VISIT: CPT | Performed by: OBSTETRICS & GYNECOLOGY

## 2018-07-05 PROCEDURE — 86900 BLOOD TYPING SEROLOGIC ABO: CPT | Performed by: OBSTETRICS & GYNECOLOGY

## 2018-07-05 PROCEDURE — 87086 URINE CULTURE/COLONY COUNT: CPT | Performed by: OBSTETRICS & GYNECOLOGY

## 2018-07-05 PROCEDURE — 86780 TREPONEMA PALLIDUM: CPT | Performed by: OBSTETRICS & GYNECOLOGY

## 2018-07-05 NOTE — MR AVS SNAPSHOT
After Visit Summary   7/5/2018    Ely Lott    MRN: [id number]           Patient Information     Date Of Birth          1985        Visit Information        Provider Department      7/5/2018 3:15 PM Kelvin Waddell MD [facility]        Care Instructions    If you have any questions regarding your visit, Please contact your care team.   [facility]  CLINIC HOURS  TELEPHONE NUMBER    DON Sterling-   Lizzie-MARIAN Gusman-MARIAN Hood-Medical Assistant  Tuesday-[place]  7:30 a.m-3:30 p.m   Wednesday-[place]  8:00 a.m-4:30 p.m.   Thursday-[place] 8:00 a.m-4:30 p.m.   Friday-[place]  7:30a.m-1:00 p.m.  [facility]   [id number] [place] [address].   [place] [address]   [phone] ask for [facility]   Imaging Oiwcqztrvl-[phone]     [facility] Delivery   [address] [place] [address] [place] [address]   [phone]     [facility]  [address] [place] [address]   [phone] ask for [facility]   Imaging [phone]      Urgent Care locations:   [facility]  Monday-Friday   5 pm - 9 pm   Saturday and Sunday   9 am - 5 pm   Monday-Friday   11 am - 9 pm   Saturday and Sunday   9 am - 5 pm  [phone]    If you need a medication refill, please contact your pharmacy. Please allow 3 business days for your refill to be completed.  As always, Thank you for trusting us with your healthcare needs!              Follow-ups after your visit        Who to contact     If you have questions or need follow up information about today's clinic visit or your schedule please contact [facility] directly at [phone].  Normal or non-critical lab and imaging results will be communicated to you by MyChart, letter or phone within 4 business days after the clinic has received the results. If you do not hear from us within 7 days, please contact  the clinic through VSHOREt or phone. If you have a critical or abnormal lab result, we will notify you by phone as soon as possible.  Submit refill requests through Knottykart or call your pharmacy and they will forward the refill request to us. Please allow 3 business days for your refill to be completed.          Additional Information About Your Visit        GetShopApphart Information     [employer] gives you secure access to your electronic health record. If you see a primary care provider, you can also send messages to your care team and make appointments. If you have questions, please call your primary care clinic.  If you do not have a primary care provider, please call [phone] and they will assist you.        Care EveryWhere ID     This is your Care EveryWhere ID. This could be used by other organizations to access your [place] medical records  [id number]        Your Vitals Were     Pulse Last Period Pulse Oximetry BMI (Body Mass Index)          88 (LMP Unknown) 99% 23.96 kg/m2         Blood Pressure from Last 3 Encounters:   07/05/18 121/78   05/22/18 130/80   01/17/18 129/84    Weight from Last 3 Encounters:   07/05/18 144 lb (65.3 kg)   05/22/18 145 lb (65.8 kg)   01/17/18 153 lb (69.4 kg)              Today, you had the following     No orders found for display       Primary Care Provider Office Phone # Fax #    [facility] [phone]       [address]  [facility] [address]        Equal Access to Services     GIOVANNI MTZ : Hadii aad ku hadasho Soomaali, waaxda luqadaha, qaybta kaalmada adeegyada, angelina sanderson . So [facility] [place] [phone].    ATENCIÓN: Si habla jeffy, tiene a piedra disposición servicios gratuitos de asistencia lingüística. Llame al [phone].    We comply with applicable federal civil rights laws and [place] laws. We do not discriminate on the basis of race, color, national origin, age, disability, sex, sexual orientation, or  gender identity.            Thank you!     Thank you for choosing [facility]  for your care. Our goal is always to provide you with excellent care. Hearing back from our patients is one way we can continue to improve our services. Please take a few minutes to complete the written survey that you may receive in the mail after your visit with us. Thank you!             Your Updated Medication List - Protect others around you: Learn how to safely use, store and throw away your medicines at www.disposemymeds.org.          This list is accurate as of 7/5/18  3:32 PM.  Always use your most recent med list.                   Brand Name Dispense Instructions for use Diagnosis    prenatal multivitamin plus iron 27-0.8 MG Tabs per tablet      Take 1 tablet by mouth daily

## 2018-07-05 NOTE — PATIENT INSTRUCTIONS
If you have any questions regarding your visit, Please contact your care team.   [facility]  CLINIC HOURS  TELEPHONE NUMBER    DON Sterling-   Lakisha Gusman-MARIAN Hood-Medical Assistant  Tuesday-[place]  7:30 a.m-3:30 p.m   Wednesday-[place]  8:00 a.m-4:30 p.m.   Thursday-[place] 8:00 a.m-4:30 p.m.   Friday-[place]  7:30a.m-1:00 p.m.  [facility]   [address] [place] [address].   [place] [address]   [phone] ask for [facility]   Imaging [phone]     [facility] Labor and Delivery   [address] [place] [address]   [phone]     [facility]  [address] [place] [address]   [phone] ask for [facility]   Imaging [phone]      Urgent Care locations:   [facility]  Monday-Friday   5 pm - 9 pm   Saturday and Sunday   9 am - 5 pm   Monday-Friday   11 am - 9 pm   Saturday and Sunday   9 am - 5 pm  [phone]    If you need a medication refill, please contact your pharmacy. Please allow 3 business days for your refill to be completed.  As always, Thank you for trusting us with your healthcare needs!

## 2018-07-06 LAB
ABO + RH BLD: NORMAL
ABO + RH BLD: NORMAL
BACTERIA SPEC CULT: NORMAL
BLD GP AB SCN SERPL QL: NORMAL
BLOOD BANK CMNT PATIENT-IMP: NORMAL
HBV SURFACE AG SERPL QL IA: NONREACTIVE
HIV 1+2 AB+HIV1 P24 AG SERPL QL IA: NONREACTIVE
RUBV IGG SERPL IA-ACNC: 39 IU/ML
SPECIMEN EXP DATE BLD: NORMAL
SPECIMEN SOURCE: NORMAL
T PALLIDUM AB SER QL: NONREACTIVE

## 2018-07-08 PROBLEM — Z34.80 SUPERVISION OF OTHER NORMAL PREGNANCY, ANTEPARTUM: Status: ACTIVE | Noted: 2018-07-08

## 2018-07-08 NOTE — PROGRESS NOTES
"\"Lizbeth\" Ely Lott is a 32 year old year old G 2 P 1 who presents for an initial obstetrical visit.  Referred by self.    Currently experiencing normal pregnancy symptoms without particular problems including pain, bleeding, marked vomiting or weight loss except: some nausea and fatigue.  This was a semi-planned pregnancy. Stopped nursing 3 mo ago. No menses and dates per early u/s.  Here today with family.  Additional concerns: Prolonged second stage, difficult delivery with shoulder dystocia and fourth degree laceration and needed granulation tissue treatment - desires primary  section and this will be planned- op, RBA discussed. Reports normal BMs now and no dyspareunia.     ROS:     Systems reviewed include constitutional; breast;                 cardiac; respiratory; gastrointestinal; genitourinary;                                musculoskeletal; integumentary; psychological;                                hematologic/lymphatic and endocrine.                  These systems were negative for significant symptoms except                 for the following: none and see above HPI.    Past medical, obstetrical, surgical, family and social history reviewed and as noted or updated in chart.     Allergies, meds and supplements are as noted or updated in chart.                    Episode OB dating, demographic and history forms completed or reviewed.    EXAM:  VS as noted. BMI- Body mass index is 23.96 kg/(m^2).    Relatively recent normal general exam- not repeated now.         ASSESSMENT: (Z34.80) Prenatal care, subsequent pregnancy, unspecified trimester  (primary encounter diagnosis)  Comment:   Plan: CBC with Platelets, HIV Antigen Antibody Combo,        Rubella Antibody IgG Quantitative, Hepatitis B         surface antigen, Treponema Abs w Reflex to RPR         and Titer, ABO/RH Type and Screen, Urine         Culture Aerobic Bacterial            (Z34.80) Supervision of other normal pregnancy, " antepartum  Comment:   Plan:        PLAN:  Advice appropriate to gestational age reviewed including pertinent Checklist items. Discussed condition, tests and general care plan. Symptoms, problems and concerns reviewed. Recommended weight gain discussed. Problem list initiated. Pap due in 1 yr. Orders as noted. RTC in 4 weeks. Discuss special screening tests next.    Kelvin Waddell MD

## 2018-07-17 ENCOUNTER — TELEPHONE (OUTPATIENT)
Dept: OBGYN | Facility: CLINIC | Age: 33
End: 2018-07-17

## 2018-07-17 ENCOUNTER — NURSE TRIAGE (OUTPATIENT)
Dept: NURSING | Facility: CLINIC | Age: 33
End: 2018-07-17

## 2018-07-17 NOTE — TELEPHONE ENCOUNTER
Patient is 13w2d, RLQ pain. Pt states that it is sometimes sharp, but a cramping pain, started about 2 hours ago. Hasn't had any vaginal bleeding. No change in vaginal discharge. Discussed with patient monitoring for now, could take some Tylenol, take a warm bath. Will route to on-call provider. Please advise if pt needs to be seen or can continue to monitor. Thanks.   Lindsey Bucio, RN-BSN

## 2018-07-17 NOTE — TELEPHONE ENCOUNTER
Per Dr Agbeh: She can continue to monitor. To go to ED or call back for any  for any bleeding.     TC to patient. Discussed recommendation. Pt stated understanding.   Lindsey Bucio RN-BSN

## 2018-07-18 NOTE — TELEPHONE ENCOUNTER
13 wk pregnant, , OB Provider: Dr Waddell. C/o RLQ pain since 1 pm today. States the pain occurs when she moves or takes a deep breath. No pain if sitting perfectly still. Rates pain 6 out of 10 severity. Does interfere w/ usual activities mostly because movement causes pain. Pain same as this afternoon, not worse or better. Tylenol, warm bath gave little to no relief. No vaginal bleeding. C/o feeling very nauseated but no vomiting. Talked to clinic & OB provider this afternoon and they thought likely she was having stretching of ligaments due to the pregnancy but pt remains very worried. Advised ED now per guideline. Pt voiced understanding and agreement.  will take to ED now. Taya Garvey RN/FNA      Reason for Disposition    MODERATE-SEVERE abdominal pain (e.g., interferes with normal activities, awakens from sleep)    Additional Information    Negative: Passed out (i.e., lost consciousness, collapsed and was not responding)    Negative: Shock suspected (e.g., cold/pale/clammy skin, too weak to stand, low BP, rapid pulse)    Negative: Difficult to awaken or acting confused  (e.g., disoriented, slurred speech)    Negative: Sounds like a life-threatening emergency to the triager    Negative: Followed an abdomen (stomach) injury    Negative: [1] Abdominal pain AND [2] pregnant > 20 weeks    Protocols used: PREGNANCY - ABDOMINAL PAIN LESS THAN 20 WEEKS EGA-ADULT-AH

## 2018-08-14 ENCOUNTER — PRENATAL OFFICE VISIT (OUTPATIENT)
Dept: OBGYN | Facility: CLINIC | Age: 33
End: 2018-08-14
Payer: COMMERCIAL

## 2018-08-14 VITALS
HEART RATE: 89 BPM | WEIGHT: 147 LBS | DIASTOLIC BLOOD PRESSURE: 72 MMHG | TEMPERATURE: 97.8 F | BODY MASS INDEX: 24.46 KG/M2 | SYSTOLIC BLOOD PRESSURE: 112 MMHG

## 2018-08-14 DIAGNOSIS — Z34.80 SUPERVISION OF OTHER NORMAL PREGNANCY, ANTEPARTUM: ICD-10-CM

## 2018-08-14 PROCEDURE — 99207 ZZC PRENATAL VISIT: CPT | Performed by: OBSTETRICS & GYNECOLOGY

## 2018-08-14 PROCEDURE — 81511 FTL CGEN ABNOR FOUR ANAL: CPT | Mod: 90 | Performed by: OBSTETRICS & GYNECOLOGY

## 2018-08-14 PROCEDURE — 99000 SPECIMEN HANDLING OFFICE-LAB: CPT | Performed by: OBSTETRICS & GYNECOLOGY

## 2018-08-14 PROCEDURE — 36415 COLL VENOUS BLD VENIPUNCTURE: CPT | Performed by: OBSTETRICS & GYNECOLOGY

## 2018-08-14 RX ORDER — METOCLOPRAMIDE 10 MG/1
10 TABLET ORAL
COMMUNITY
Start: 2018-07-18 | End: 2018-08-14

## 2018-08-14 RX ORDER — DIPHENHYDRAMINE HCL 25 MG
25 CAPSULE ORAL
COMMUNITY
Start: 2018-07-18 | End: 2018-08-14

## 2018-08-14 NOTE — PATIENT INSTRUCTIONS
If you have any questions regarding your visit, Please contact your care team.     [employer] Services: [phone]    Women s Health CLINIC HOURS TELEPHONE NUMBER       DON Murrieta-      Duncan Wills-Medical Assistant       Monday-[place]  8:00a.m-4:45 p.m  Tuesday-[place]  9:00a.m-11:45 a.m  Wednesday-[place] 8:00a.m-4:45 p.m.  Thursday-[place]  8:00a.m-4:45 p.m.  Friday-[place]  8:00a.m-4:45 p.m. [facility]  [address] [place] [address]  [phone] ask [facility]  [phone] Fax  Imaging [phone]    [facility] Labor and Delivery  [address] [place] [address] [place] [address]  [phone]    [facility]  [address] [place] [address]  [phone] [facility]  [phone] Fax  Imaging [phone]     Urgent Care locations:    [place]        [place] Monday-Friday  5 pm - 9 pm  Saturday and Sunday   9 am - 5 pm    Monday-Friday   11 am - 9 pm  Saturday and Sunday   9 am - 5 pm   [phone]       If you need a medication refill, please contact your pharmacy. Please allow 3 business days for your refill to be completed.  As always, Thank you for trusting us with your healthcare needs!

## 2018-08-14 NOTE — MR AVS SNAPSHOT
After Visit Summary   8/14/2018    Ely Lott    MRN: [id number]           Patient Information     Date Of Birth          1985        Visit Information        Provider Department      8/14/2018 10:15 AM Kelvin Waddell MD [facility]        Today's Diagnoses     Supervision of other normal pregnancy, antepartum          Care Instructions                                                        If you have any questions regarding your visit, Please contact your care team.     [facility] Services: [phone]    [facility] CLINIC HOURS TELEPHONE NUMBER       DON Murrieta-      Duncan Wills-Medical Assistant       [facility]  8:00a.m-4:45 p.m  [facility]  9:00a.m-11:45 a.m  [facility] 8:00a.m-4:45 p.m.  [facility]  8:00a.m-4:45 p.m.  [facility]  8:00a.m-4:45 p.m. [facility]  [address] [place] [address]  [phone] ask for [employer]  [phone] Fax  Imaging [phone]    [facility] Labor and Delivery  [address] [place] [address] [place] [address]  [phone]    [facility]  [address] [place], [place] [address] [phone] ask [facility]  [phone] Fax  Imaging [phone]     Urgent Care locations:    [facility] Monday-Friday  5 pm - 9 pm  Saturday and Sunday   9 am - 5 pm    Monday-Friday   11 am - 9 pm  Saturday and Sunday   9 am - 5 pm   [phone]       If you need a medication refill, please contact your pharmacy. Please allow 3 business days for your refill to be completed.  As always, Thank you for trusting us with your healthcare needs!            Follow-ups after your visit        Who to contact     If you have questions or need follow up information about today's clinic visit or your schedule please contact [facility]  CHERI HOOKER directly at [phone].  Normal or non-critical lab and imaging results will be communicated to you by 5 Minuteshart, letter or phone within 4 business days after the clinic has received the results. If you do not hear from us within 7 days, please contact the clinic through 5 Minuteshart or phone. If you have a critical or abnormal lab result, we will notify you by phone as soon as possible.  Submit refill requests through B[employer] In [employer] or call your pharmacy and they will forward the refill request to us. Please allow 3 business days for your refill to be completed.          Additional Information About Your Visit        [employer] Minutes[employer]Smart [employer] Information     [employer] gives you secure access to your electronic health record. If you see a primary care provider, you can also send messages to your care team and make appointments. If you have questions, please call your primary care clinic.  If you do not have a primary care provider, please call [phone] and they will assist you.        Care EveryWhere ID     This is your Care EveryWhere ID. This could be used by other organizations to access your [place] medical records  [id number]        Your Vitals Were     Pulse Temperature Last Period Breastfeeding? BMI (Body Mass Index)       89 97.8  F (36.6  C) (Oral) (LMP Unknown) No 24.46 kg/m2        Blood Pressure from Last 3 Encounters:   08/14/18 112/72   07/05/18 121/78   05/22/18 130/80    Weight from Last 3 Encounters:   08/14/18 147 lb (66.7 kg)   07/05/18 144 lb (65.3 kg)   05/22/18 145 lb (65.8 kg)              Today, you had the following     No orders found for display         Today's Medication Changes          These changes are accurate as of 8/14/18 10:16 AM.  If you have any questions, ask your nurse or doctor.               Stop taking these medicines if you haven't already. Please contact your care team if you have questions.     diphenhydrAMINE 25 MG capsule   Commonly known as:  BENADRYL   Stopped by:  Kelvin Waddell  MD Bc           metoclopramide 10 MG tablet   Commonly known as:  REGLAN   Stopped by:  Kelvin Waddell MD                    Primary Care Provider Office Phone # Fax #    [facility] [phone]       [address]  [facility] [address]        Equal Access to Services     [facility]: Hadii aad ku hadasho Soomaali, waaxda luqadaha, qaybta kaalmada adeegyada, waxay idiin hayaan adeeg kharash la'aan . So [facility] [phone].    ATENCIÓN: Si habla español, tiene a piedra disposición servicios gratuitos de asistencia lingüística. Llame al [phone].    We comply with applicable federal civil rights laws and [place] laws. We do not discriminate on the basis of race, color, national origin, age, disability, sex, sexual orientation, or gender identity.            Thank you!     Thank you for choosing [facility]  for your care. Our goal is always to provide you with excellent care. Hearing back from our patients is one way we can continue to improve our services. Please take a few minutes to complete the written survey that you may receive in the mail after your visit with us. Thank you!             Your Updated Medication List - Protect others around you: Learn how to safely use, store and throw away your medicines at www.disposemymeds.org.          This list is accurate as of 8/14/18 10:16 AM.  Always use your most recent med list.                   Brand Name Dispense Instructions for use Diagnosis    prenatal multivitamin plus iron 27-0.8 MG Tabs per tablet      Take 1 tablet by mouth daily

## 2018-08-14 NOTE — PROGRESS NOTES
No signif signs, symptoms or concerns. Here with son.   Discussed special diagnostic and screening tests and plans (y = yes, n = no/declined, u = uncertain/considering): quad scr = y, survey u/s = y, Level 2 survey u/s with MFM = n, CF carrier scr = n, hemoglobinopathy or thalassemia scr= n, SMA, fragile X  and other genetic scr= n, 1st trimester scr with NT and later AFP or with cell free DNA and later AFP = n, cell free DNA= n, genetic amnio/CVS = n.  Advice appropriate to gestational age reviewed including pertinent Checklist items. Discussed condition, tests and care plan including RBA. Problem list updated. Survey ultrasound next.  A/P:  Ely was seen today for prenatal care.    Diagnoses and all orders for this visit:    Supervision of other normal pregnancy, antepartum  -     Maternal Quad Marker 2nd Trimester  -     US OB > 14 Weeks Complete Single; Future        Kelvin Waddell MD

## 2018-08-15 LAB
# FETUSES US: NORMAL
# FETUSES: 1
AFP ADJ MOM AMN: 0.51
AFP SERPL-MCNC: 20 NG/ML
AGE - REPORTED: 32.2 YR
CURRENT SMOKER: NO
CURRENT SMOKER: NO
DIABETES STATUS PATIENT: NO
FAMILY MEMBER DISEASES HX: NO
FAMILY MEMBER DISEASES HX: NO
GA METHOD: NORMAL
GA METHOD: NORMAL
GA: NORMAL WK
HCG MOM SERPL: 0.58
HCG SERPL-ACNC: NORMAL IU/L
HX OF HEREDITARY DISORDERS: NO
IDDM PATIENT QL: NO
INHIBIN A MOM SERPL: 0.58
INHIBIN A SERPL-MCNC: 92 PG/ML
INTEGRATED SCN PATIENT-IMP: NORMAL
IVF PREGNANCY: NO
LMP START DATE: NORMAL
MONOCHORIONIC TWINS: NO
PATHOLOGY STUDY: NORMAL
PREV FETUS DEFECT: NO
SERVICE CMNT-IMP: NO
SPECIMEN DRAWN SERPL: NORMAL
U ESTRIOL MOM SERPL: 1.63
U ESTRIOL SERPL-MCNC: 2.03 NG/ML
VALPROIC/CARBAMAZEPINE STATUS: NO
WEIGHT UNITS: 147

## 2018-09-05 ENCOUNTER — RADIANT APPOINTMENT (OUTPATIENT)
Dept: ULTRASOUND IMAGING | Facility: CLINIC | Age: 33
End: 2018-09-05
Attending: OBSTETRICS & GYNECOLOGY
Payer: COMMERCIAL

## 2018-09-05 DIAGNOSIS — Z34.80 SUPERVISION OF OTHER NORMAL PREGNANCY, ANTEPARTUM: ICD-10-CM

## 2018-09-05 PROCEDURE — 76805 OB US >/= 14 WKS SNGL FETUS: CPT

## 2018-09-13 ENCOUNTER — PRENATAL OFFICE VISIT (OUTPATIENT)
Dept: OBGYN | Facility: CLINIC | Age: 33
End: 2018-09-13
Payer: COMMERCIAL

## 2018-09-13 VITALS
WEIGHT: 154 LBS | HEART RATE: 77 BPM | SYSTOLIC BLOOD PRESSURE: 108 MMHG | DIASTOLIC BLOOD PRESSURE: 73 MMHG | BODY MASS INDEX: 25.63 KG/M2 | TEMPERATURE: 98 F

## 2018-09-13 DIAGNOSIS — Z34.80 SUPERVISION OF OTHER NORMAL PREGNANCY, ANTEPARTUM: ICD-10-CM

## 2018-09-13 PROCEDURE — 99207 ZZC PRENATAL VISIT: CPT | Performed by: OBSTETRICS & GYNECOLOGY

## 2018-09-13 NOTE — PROGRESS NOTES
No signif signs, symptoms or concerns except seasonal allergies and recent mild URI with cough- improved. Here with family. Advice appropriate to gestational age reviewed including pertinent Checklist items. Discussed condition, tests and care plan including RBA. Problem list updated. 1h GTT next.  A/P:  Ely was seen today for prenatal care.    Diagnoses and all orders for this visit:    Supervision of other normal pregnancy, antepartum        Kelvin Waddell MD

## 2018-09-13 NOTE — PATIENT INSTRUCTIONS
If you have any questions regarding your visit, Please contact your care team.     [employer] Services: [phone]    Women s Health CLINIC HOURS TELEPHONE NUMBER       DON Murrieta-      Duncan Wills-Medical Assistant       Monday-[place]  8:00a.m-4:45 p.m  Tuesday-[place]  9:00a.m-11:45 a.m  Wednesday-[place] 8:00a.m-4:45 p.m.  Thursday-[place]  8:00a.m-4:45 p.m.  Friday-[place]  8:00a.m-4:45 p.m. [facility]  [address] [place] [address]  [phone] ask [facility]  [phone] Fax  Imaging [phone]    [facility] Labor and Delivery  [address] [place] [address] [place] [address]  [phone]    [facility]  [address] [place] [address]  [phone] Critical access hospital  [phone] Fax  Imaging [phone]     Urgent Care locations:    [place]        [place] Monday-Friday  5 pm - 9 pm  Saturday and Sunday   9 am - 5 pm    Monday-Friday   11 am - 9 pm  Saturday and Sunday   9 am - 5 pm   [phone]       If you need a medication refill, please contact your pharmacy. Please allow 3 business days for your refill to be completed.  As always, Thank you for trusting us with your healthcare needs!

## 2018-09-13 NOTE — MR AVS SNAPSHOT
After Visit Summary   9/13/2018    Ely Lott    MRN: [id number]           Patient Information     Date Of Birth          1985        Visit Information        Provider Department      9/13/2018 8:00 AM Kelvin Waddell MD [facility]        Today's Diagnoses     Supervision of other normal pregnancy, antepartum          Care Instructions                                                        If you have any questions regarding your visit, Please contact your care team.     [facility] Services: [phone]    [facility] CLINIC HOURS TELEPHONE NUMBER       DON Murrieta-      Duncan Wills-Medical Assistant       [facility]  8:00[facility].m-4:45 p.m  [facility]  9:00a.m-11:45 a.m  [facility] 8:00a.m-4:45 p.m.  Thursday[facility]  8:00a.m-4:45 p.m.  Friday[facility]  8:00a.m-4:45 p.m. [facility]  [address] [place] [address]  [phone] ask for [facility]  [phone] Fax  Imaging [phone]    [facility] Labor and Delivery  [address] [place] [address] [place] [address]  [phone]    [facility]  [address] [place], [place] [address] [phone] ask [facility]  [phone] Fax  Imaging [phone]     Urgent Care locations:    [facility] Monday-Friday  5 pm - 9 pm  Saturday and Sunday   9 am - 5 pm    Monday-Friday   11 am - 9 pm  Saturday and Sunday   9 am - 5 pm   [phone]       If you need a medication refill, please contact your pharmacy. Please allow 3 business days for your refill to be completed.  As always, Thank you for trusting us with your healthcare needs!            Follow-ups after your visit        Who to contact     If you have questions or need follow up information about today's clinic visit or your schedule please contact [facility]  [facility] directly at [phone].  Normal or non-critical lab and imaging results will be communicated to you by MyChart, letter or phone within 4 business days after the clinic has received the results. If you do not hear from us within 7 days, please contact the clinic through StowThathart or phone. If you have a critical or abnormal lab result, we will notify you by phone as soon as possible.  Submit refill requests through [employer] or call your pharmacy and they will forward the refill request to us. Please allow 3 business days for your refill to be completed.          Additional Information About Your Visit        StowThatharLGC Wireless Information     [employer] gives you secure access to your electronic health record. If you see a primary care provider, you can also send messages to your care team and make appointments. If you have questions, please call your primary care clinic.  If you do not have a primary care provider, please call [phone] and they will assist you.        Care EveryWhere ID     This is your Care EveryWhere ID. This could be used by other organizations to access your [place] medical records  [id number]        Your Vitals Were     Pulse Temperature Last Period Breastfeeding? BMI (Body Mass Index)       77 98  F (36.7  C) (LMP Unknown) No 25.63 kg/m2        Blood Pressure from Last 3 Encounters:   09/13/18 108/73   08/14/18 112/72   07/05/18 121/78    Weight from Last 3 Encounters:   09/13/18 154 lb (69.9 kg)   08/14/18 147 lb (66.7 kg)   07/05/18 144 lb (65.3 kg)              Today, you had the following     No orders found for display       Primary Care Provider Office Phone # Fax #    [facility] [phone]       [address]  [facility] [address]        Equal Access to Services     GIOVANNI MTZ : Tucker chamorro Soteri, wayonida geo, qaybta kaalmada carlos, angelina diego. So [facility] [phone].    ATENCIÓN: May chang  español, tiene a piedra disposición servicios gratuitos de asistencia lingüística. Henny kent [phone].    We comply with applicable federal civil rights laws and [place] laws. We do not discriminate on the basis of race, color, national origin, age, disability, sex, sexual orientation, or gender identity.            Thank you!     Thank you for choosing [facility]  for your care. Our goal is always to provide you with excellent care. Hearing back from our patients is one way we can continue to improve our services. Please take a few minutes to complete the written survey that you may receive in the mail after your visit with us. Thank you!             Your Updated Medication List - Protect others around you: Learn how to safely use, store and throw away your medicines at www.disposemymeds.org.          This list is accurate as of 9/13/18  8:11 AM.  Always use your most recent med list.                   Brand Name Dispense Instructions for use Diagnosis    prenatal multivitamin plus iron 27-0.8 MG Tabs per tablet      Take 1 tablet by mouth daily

## 2018-10-10 ENCOUNTER — PRENATAL OFFICE VISIT (OUTPATIENT)
Dept: OBGYN | Facility: CLINIC | Age: 33
End: 2018-10-10
Payer: COMMERCIAL

## 2018-10-10 VITALS
SYSTOLIC BLOOD PRESSURE: 116 MMHG | HEART RATE: 76 BPM | BODY MASS INDEX: 26.63 KG/M2 | DIASTOLIC BLOOD PRESSURE: 75 MMHG | TEMPERATURE: 98 F | WEIGHT: 160 LBS

## 2018-10-10 DIAGNOSIS — Z34.80 SUPERVISION OF OTHER NORMAL PREGNANCY, ANTEPARTUM: ICD-10-CM

## 2018-10-10 LAB
GLUCOSE 1H P 50 G GLC PO SERPL-MCNC: 91 MG/DL (ref 60–129)
HGB BLD-MCNC: 12 G/DL (ref 11.7–15.7)

## 2018-10-10 PROCEDURE — 99207 ZZC PRENATAL VISIT: CPT | Performed by: OBSTETRICS & GYNECOLOGY

## 2018-10-10 PROCEDURE — 36415 COLL VENOUS BLD VENIPUNCTURE: CPT | Performed by: OBSTETRICS & GYNECOLOGY

## 2018-10-10 PROCEDURE — 00000218 ZZHCL STATISTIC OBHBG - HEMOGLOBIN: Performed by: OBSTETRICS & GYNECOLOGY

## 2018-10-10 PROCEDURE — 82950 GLUCOSE TEST: CPT | Performed by: OBSTETRICS & GYNECOLOGY

## 2018-10-10 NOTE — MR AVS SNAPSHOT
After Visit Summary   10/10/2018    Ely Lott    MRN: [id number]           Patient Information     Date Of Birth          1985        Visit Information        Provider Department      10/10/2018 2:15 PM Kelvin Waddell MD [facility]        Today's Diagnoses     Supervision of other normal pregnancy, antepartum          Care Instructions                                                        If you have any questions regarding your visit, Please contact your care team.     [facility] Access Services: [phone]    [facility] CLINIC HOURS TELEPHONE NUMBER       DON Murrieta-      Duncan Wills-Medical Assistant       Monday-[place]  8:00a.m-4:45 p.m  Tuesday[facility]  9:00a.m-11:45 a.m  Wednesday[facility] 8:00a.m-4:45 p.m.  Thursday[facility]  8:00a.m-4:45 p.m.  Friday[facility]  8:00a.m-4:45 p.m. [facility]  [address] [place] [address]  [phone] ask for [facility]  [phone] Fax  Imaging [phone]    [facility] Labor and Delivery  [address] [place] [address] [place] [address]  [phone]    [facility]  [address] [place], [place] [address] [phone] ask [facility]  [phone] Fax  Imaging [phone]     Urgent Care locations:    [facility] Monday-Friday  5 pm - 9 pm  Saturday and Sunday   9 am - 5 pm    Monday-Friday   11 am - 9 pm  Saturday and Sunday   9 am - 5 pm   [phone]       If you need a medication refill, please contact your pharmacy. Please allow 3 business days for your refill to be completed.  As always, Thank you for trusting us with your healthcare needs!            Follow-ups after your visit        Who to contact     If you have questions or need follow up information about today's clinic visit or your schedule please contact [facility]  [facility] directly at [phone].  Normal or non-critical lab and imaging results will be communicated to you by MyChart, letter or phone within 4 business days after the clinic has received the results. If you do not hear from us within 7 days, please contact the clinic through Oysterhart or phone. If you have a critical or abnormal lab result, we will notify you by phone as soon as possible.  Submit refill requests through DipJar or call your pharmacy and they will forward the refill request to us. Please allow 3 business days for your refill to be completed.          Additional Information About Your Visit        OysterharMyWave Information     DipJar gives you secure access to your electronic health record. If you see a primary care provider, you can also send messages to your care team and make appointments. If you have questions, please call your primary care clinic.  If you do not have a primary care provider, please call [phone] and they will assist you.        Care EveryWhere ID     This is your Care EveryWhere ID. This could be used by other organizations to access your [place] medical records  [id number]        Your Vitals Were     Pulse Temperature Last Period Breastfeeding? BMI (Body Mass Index)       76 98  F (36.7  C) (LMP Unknown) No 26.63 kg/m2        Blood Pressure from Last 3 Encounters:   10/10/18 116/75   09/13/18 108/73   08/14/18 112/72    Weight from Last 3 Encounters:   10/10/18 160 lb (72.6 kg)   09/13/18 154 lb (69.9 kg)   08/14/18 147 lb (66.7 kg)              Today, you had the following     No orders found for display       Primary Care Provider Office Phone # Fax #    [facility] [phone]       [address]  [facility] [address]        Equal Access to Services     GIOVANNI MTZ : Tucker chamorro Soteri, wayonida unaqjo, qaybta kaalmada carlos, angelina diego. So [facility] [phone].    ATENCIÓN: May chang  español, tiene a piedra disposición servicios gratuitos de asistencia lingüística. Henny kent [phone].    We comply with applicable federal civil rights laws and [place] laws. We do not discriminate on the basis of race, color, national origin, age, disability, sex, sexual orientation, or gender identity.            Thank you!     Thank you for choosing [facility]  for your care. Our goal is always to provide you with excellent care. Hearing back from our patients is one way we can continue to improve our services. Please take a few minutes to complete the written survey that you may receive in the mail after your visit with us. Thank you!             Your Updated Medication List - Protect others around you: Learn how to safely use, store and throw away your medicines at www.disposemymeds.org.          This list is accurate as of 10/10/18  2:17 PM.  Always use your most recent med list.                   Brand Name Dispense Instructions for use Diagnosis    prenatal multivitamin plus iron 27-0.8 MG Tabs per tablet      Take 1 tablet by mouth daily

## 2018-10-11 ENCOUNTER — TELEPHONE (OUTPATIENT)
Dept: OBGYN | Facility: CLINIC | Age: 33
End: 2018-10-11

## 2018-10-11 NOTE — TELEPHONE ENCOUNTER
Can not schedule at [facility] until 10/22/18- will call then to schedule.

## 2018-10-11 NOTE — PROGRESS NOTES
No signif signs, symptoms or concerns. Continues some chiropractic treatment. Advice appropriate to gestational age reviewed including pertinent Checklist items. Discussed condition, tests and care plan including RBA. Problem list updated.   A/P:  Ely was seen today for prenatal care.    Diagnoses and all orders for this visit:    Supervision of other normal pregnancy, antepartum  -     Glucose tolerance gest screen 1 hour  -     OB hemoglobin        Kelvin Waddell MD

## 2018-10-11 NOTE — TELEPHONE ENCOUNTER
Associated Diagnoses      Supervision of other normal pregnancy, antepartum           Comments      Please schedule surgery as noted. Block out clinic time in [facility] as needed.   NPO 8 hours prior to procedure time and shower the night before or morning of surgery.       Order Questions      Question Answer Comment     Procedure name(s) - multi select  Delivery      Reason for procedure elective primary- past shoulder dystocia and fourth degree laceration      Surgeon: Bairon      Is this a multi surgeon case? No      Laterality N/A      Request for additional equipment Other (see comments) None     Anesthesia Spinal      Positioning (if different from preference card): Supine      Initiate Pre-op orders for above procedure: Yes, as ordered in Epic additional orders noted there also     Location of Case: [facility]      Operating room  requested: Yes      Urgency of Surgery: Routine      Surgeon Procedure Time (incision to closure) in minutes (per procedure as applicable) 60      Note:  Surgical Case Time Needed (in minutes)     Surgery Date: 39-40 weeks      Patient Class (for admit prior to surgery, specify number of days in comments): Surgery admit admit day of surgery     Length of Stay: 3 days      H&P To Be Completed By: Surgeon at future OB visit     [facility]      Post-Op Appointment 6 weeks      Vendor Needed? No

## 2018-10-11 NOTE — TELEPHONE ENCOUNTER
Surgery Scheduled.    Date of Surgery 19 Time of Surgery 9:30 am  Procedure: Primary   [facility]urgical [facility] [facility]  Surgeon: Dr. Waddell  Type of Anesthesia Anticipated: Spinal  Pre-op: To be done by Dr. Wdadell at [facility]  6 week post op To be scheduled later with Dr. Waddell at  [facility]  Pre-certification 10/22/18  Consent signed: NA  Hospital Stay 3 days       [facility] [place] packet mailed to patient's home address.  Patient instructed NPO 12 hours prior to surgery, arrive 1 3/4 hours prior to surgery, must not have a .  Patient understood and agrees to the plan.      Surgery Pre-Certification    Medical Record Number: [id number]  Ely Lott  YOB: 1985   Phone: [phone] (home) [phone] (work)  Primary Provider: [facility], [facility]    Reason for Admit:  Past Shoulder Dystocia/ Past Fourth Decree Laceration    Surgeon: Kelvin Waddell MD  Surgical Procedure: C/Section  ICD-9 Coded: O02.299/ Z87.59  Date of Surgery: 19  Consent signed? N/A      Hospital: [facility]  Inpatient- Length of stay:  3 days.    Requestor:  Talia Mosley     Location:  [facility]    Marley Jones CMA

## 2018-10-22 ENCOUNTER — MEDICAL CORRESPONDENCE (OUTPATIENT)
Dept: HEALTH INFORMATION MANAGEMENT | Facility: CLINIC | Age: 33
End: 2018-10-22

## 2018-10-30 ENCOUNTER — PRENATAL OFFICE VISIT (OUTPATIENT)
Dept: OBGYN | Facility: CLINIC | Age: 33
End: 2018-10-30
Payer: COMMERCIAL

## 2018-10-30 VITALS
DIASTOLIC BLOOD PRESSURE: 75 MMHG | WEIGHT: 165.4 LBS | BODY MASS INDEX: 27.52 KG/M2 | HEART RATE: 84 BPM | TEMPERATURE: 97.4 F | OXYGEN SATURATION: 97 % | SYSTOLIC BLOOD PRESSURE: 111 MMHG

## 2018-10-30 DIAGNOSIS — Z34.80 SUPERVISION OF OTHER NORMAL PREGNANCY, ANTEPARTUM: Primary | ICD-10-CM

## 2018-10-30 DIAGNOSIS — Z23 NEED FOR TDAP VACCINATION: ICD-10-CM

## 2018-10-30 PROCEDURE — 90471 IMMUNIZATION ADMIN: CPT | Performed by: OBSTETRICS & GYNECOLOGY

## 2018-10-30 PROCEDURE — 90715 TDAP VACCINE 7 YRS/> IM: CPT | Performed by: OBSTETRICS & GYNECOLOGY

## 2018-10-30 PROCEDURE — 99207 ZZC PRENATAL VISIT: CPT | Performed by: OBSTETRICS & GYNECOLOGY

## 2018-10-30 NOTE — NURSING NOTE
Screening Questionnaire for Adult Immunization    Are you sick today?   No   Do you have allergies to medications, food, a vaccine component or latex?   Yes   Have you ever had a serious reaction after receiving a vaccination?   No   Do you have a long-term health problem with heart disease, lung disease, asthma, kidney disease, metabolic disease (e.g. diabetes), anemia, or other blood disorder?   No   Do you have cancer, leukemia, HIV/AIDS, or any other immune system problem?   No   In the past 3 months, have you taken medications that affect  your immune system, such as prednisone, other steroids, or anticancer drugs; drugs for the treatment of rheumatoid arthritis, Crohn s disease, or psoriasis; or have you had radiation treatments?   No   Have you had a seizure, or a brain or other nervous system problem?   No   During the past year, have you received a transfusion of blood or blood     products, or been given immune (gamma) globulin or antiviral drug?   No   For women: Are you pregnant or is there a chance you could become        pregnant during the next month?   Yes   Have you received any vaccinations in the past 4 weeks?   No     Immunization questionnaire was positive for at least one answer.  Notified Dr. Waddell.        Per orders of Dr. Waddell, injection of TDAP given by Ericka Almazan. Patient instructed to remain in clinic for 15 minutes afterwards, and to report any adverse reaction to me immediately.       Screening performed by Ericka Almazan on 10/30/2018 at 11:18 AM.

## 2018-10-30 NOTE — PROGRESS NOTES
TDAP Vaccine (Adacel)      Date Status Dose VIS Date Route Site  Lot# Given By Verified By     10/30/2018 Given 0.5 mL 02/24/2015, Given Today IM LD [employer] [id number] Ericka Almazan MA --         Exp. Date NDC # Product Time Location External Comment     12/15/2020 32239-995-00 ADACEL --  --      Updated by: Ericka Almazan MA on 10/30/2018 11:31 AM

## 2018-10-30 NOTE — MR AVS SNAPSHOT
After Visit Summary   10/30/2018    Ely Lott    MRN: [id number]           Patient Information     Date Of Birth          1985        Visit Information        Provider Department      10/30/2018 11:00 AM Kelvin Waddell MD [facility]        Today's Diagnoses     Need for Tdap vaccination    -  1    Supervision of other normal pregnancy, antepartum          Care Instructions              If you have any questions regarding your visit, Please contact your care team.      [facility] Access Services: [phone]     [facility] CLINIC HOURS TELEPHONE NUMBER   DON Murrieta-     Duncan Wills-Medical Assistant Monday-[place]  8:00a.m-4:45 p.m  Wednesday-[place] 8:00a.m-4:45 p.m.  Thursday-[place] 8:00a.m-4:45 p.m.  Friday-[place]  8:00a.m-4:45 p.m. [facility]  [address] [place] [address]. [place]  [place], [place] [address] [phone] ask [facility]  [phone] Fax  Imaging [phone]     [facility]  [address] [place] [address] Dr.  [place], [place] [address] [phone]     [facility]  [address] [facility] [place] [address]  [phone] ask [facility]  [phone] Fax  Imaging [phone]      Urgent Care locations:    [facility] Monday-Friday  5 pm - 9 pm  Saturday and Sunday   9 am - 5 pm     Monday-Friday   11 am - 9 pm  Saturday and Sunday  9 am - 5 pm    [phone]         If you need a medication refill, please contact your pharmacy. Please allow 3 business days for your refill to be completed.  As always, Thank you for trusting us with your healthcare needs!                 Follow-ups after your visit        Your next 10 appointments already scheduled     Nov 15, 2018 10:30 AM CST   ESTABLISHED PRENATAL with Kelvin Waddell MD   [facility]  [place])    [address] [facility] [address] [phone]              Who to contact     If you have questions or need follow up information about today's clinic visit or your schedule please contact [facility] directly at [phone].  Normal or non-critical lab and imaging results will be communicated to you by MyChart, letter or phone within 4 business days after the clinic has received the results. If you do not hear from us within 7 days, please contact the clinic through MyChart or phone. If you have a critical or abnormal lab result, we will notify you by phone as soon as possible.  Submit refill requests through NCR or call your pharmacy and they will forward the refill request to us. Please allow 3 business days for your refill to be completed.          Additional Information About Your Visit        MyChart Information     NCR gives you secure access to your electronic health record. If you see a primary care provider, you can also send messages to your care team and make appointments. If you have questions, please call your primary care clinic.  If you do not have a primary care provider, please call [phone] and they will assist you.        Care EveryWhere ID     This is your Care EveryWhere ID. This could be used by other organizations to access your [facility] medical records  [id number]        Your Vitals Were     Pulse Temperature Last Period Pulse Oximetry BMI (Body Mass Index)       84 97.4  F (36.3  C) (Oral) (LMP Unknown) 97% 27.52 kg/m2        Blood Pressure from Last 3 Encounters:   10/30/18 111/75   10/10/18 116/75   09/13/18 108/73    Weight from Last 3 Encounters:   10/30/18 165 lb 6.4 oz (75 kg)   10/10/18 160 lb (72.6 kg)   09/13/18 154 lb (69.9 kg)              We Performed the Following     ADMIN 1st VACCINE     TDAP VACCINE (ADACEL)        Primary Care Provider Office Phone # Fax #    [facility] [phone]  [phone]       [address]  [place] [facility] [address]        Equal Access to Services     GIOVANNI MTZ : Hadii aad ku haddovo Soabhishekali, waaxda luqadaha, qaybta kaalmada adecelialanada, angelina hagen chrisziggy garciavegakelsey diego. So [facility] [phone].    ATENCIÓN: Si habla español, tiene a piedra disposición servicios gratuitos de asistencia lingüística. Llame al [phone].    We comply with applicable federal civil rights laws and [place] laws. We do not discriminate on the basis of race, color, national origin, age, disability, sex, sexual orientation, or gender identity.            Thank you!     Thank you for choosing [facility]  for your care. Our goal is always to provide you with excellent care. Hearing back from our patients is one way we can continue to improve our services. Please take a few minutes to complete the written survey that you may receive in the mail after your visit with us. Thank you!             Your Updated Medication List - Protect others around you: Learn how to safely use, store and throw away your medicines at www.disposemymeds.org.          This list is accurate as of 10/30/18 11:21 AM.  Always use your most recent med list.                   Brand Name Dispense Instructions for use Diagnosis    prenatal multivitamin plus iron 27-0.8 MG Tabs per tablet      Take 1 tablet by mouth daily

## 2018-10-30 NOTE — PATIENT INSTRUCTIONS
If you have any questions regarding your visit, Please contact your care team.      [facility] Access Services: [phone]     [facility] CLINIC HOURS TELEPHONE NUMBER   DON Murrieta-     Duncan Wills-Medical Assistant Monday-[place]  8:00a.m-4:45 p.m  Wednesday-[place] 8:00a.m-4:45 p.m.  Thursday-[place] 8:00a.m-4:45 p.m.  Friday-[place]  8:00a.m-4:45 p.m. [facility]  [address] [place] [address]  [phone] ask [facility]  [phone] Fax  Imaging [phone]     [facility] Labor and Delivery  [address] [place] [address] [place] [address]  [phone]     [facility]  [address] [place] [address]  [phone] [facility]  [phone] Fax  Imaging [phone]      Urgent Care locations:    [place]          [place] Monday-Friday  5 pm - 9 pm  Saturday and Sunday   9 am - 5 pm     Monday-Friday   11 am - 9 pm  Saturday and Sunday  9 am - 5 pm    [phone]         If you need a medication refill, please contact your pharmacy. Please allow 3 business days for your refill to be completed.  As always, Thank you for trusting us with your healthcare needs!

## 2018-10-30 NOTE — PROGRESS NOTES
No signif signs, symptoms or concerns. Moving to BP in process. Tdap given. Here with son. Advice appropriate to gestational age reviewed including pertinent Checklist items. Discussed condition, tests and care plan including RBA. Problem list updated.   A/P:  Ely was seen today for prenatal care.    Diagnoses and all orders for this visit:    Need for Tdap vaccination  -     TDAP VACCINE (ADACEL)  -     ADMIN 1st VACCINE    Supervision of other normal pregnancy, antepartum        Kelvin Waddell MD

## 2018-11-15 ENCOUNTER — PRENATAL OFFICE VISIT (OUTPATIENT)
Dept: OBGYN | Facility: CLINIC | Age: 33
End: 2018-11-15
Payer: COMMERCIAL

## 2018-11-15 VITALS
WEIGHT: 169 LBS | DIASTOLIC BLOOD PRESSURE: 77 MMHG | HEART RATE: 114 BPM | BODY MASS INDEX: 28.12 KG/M2 | SYSTOLIC BLOOD PRESSURE: 121 MMHG

## 2018-11-15 DIAGNOSIS — Z34.80 SUPERVISION OF OTHER NORMAL PREGNANCY, ANTEPARTUM: ICD-10-CM

## 2018-11-15 PROCEDURE — 99207 ZZC PRENATAL VISIT: CPT | Performed by: OBSTETRICS & GYNECOLOGY

## 2018-11-15 NOTE — MR AVS SNAPSHOT
After Visit Summary   11/15/2018    Ely Lott    MRN: [id number]           Patient Information     Date Of Birth          1985        Visit Information        Provider Department      11/15/2018 10:30 AM Kelvin Waddell MD [facility]        Today's Diagnoses     Supervision of other normal pregnancy, antepartum          Care Instructions                                                        If you have any questions regarding your visit, Please contact your care team.     [facility] Access Services: [phone]    [facility] CLINIC HOURS TELEPHONE NUMBER       DON Murrieta-      Duncan Wills-Medical Assistant       [facility]  8:00[facility]-4:45 p.m  [facility]  9:00a.m-11:45 a.m  [facility] 8:00a.m-4:45 p.m.  Thursday[facility]  8:00a.m-4:45 p.m.  [facility]  8:00a.m-4:45 p.m. [facility]  [address] [place] [address]  [phone] ask for [facility]  [phone] Fax  Imaging [phone]    [facility] Labor and Delivery  [address] [place] [address] [place] [address]  [phone]    [facility]  [address] [place], [place] [address] [phone] ask [facility]  [phone] Fax  Imaging [phone]     Urgent Care locations:    [facility] Monday-Friday  5 pm - 9 pm  Saturday and Sunday   9 am - 5 pm    Monday-Friday   11 am - 9 pm  Saturday and Sunday   9 am - 5 pm   [phone]       If you need a medication refill, please contact your pharmacy. Please allow 3 business days for your refill to be completed.  As always, Thank you for trusting us with your healthcare needs!            Follow-ups after your visit        Who to contact     If you have questions or need follow up information about today's clinic visit or your schedule please contact [place]  [facility] directly at [phone].  Normal or non-critical lab and imaging results will be communicated to you by MyChart, letter or phone within 4 business days after the clinic has received the results. If you do not hear from us within 7 days, please contact the clinic through [employer] or phone. If you have a critical or abnormal lab result, we will notify you by phone as soon as possible.  Submit refill requests through [employer] or call your pharmacy and they will forward the refill request to us. Please allow 3 business days for your refill to be completed.          Additional Information About Your Visit        [facility]MI RatingsharSCL Information     [employer] gives you secure access to your electronic health record. If you see a primary care provider, you can also send messages to your care team and make appointments. If you have questions, please call your primary care clinic.  If you do not have a primary care provider, please call [phone] and they will assist you.        Care EveryWhere ID     This is your Care EveryWhere ID. This could be used by other organizations to access your [facility] medical records  [id number]        Your Vitals Were     Pulse Last Period Breastfeeding? BMI (Body Mass Index)          114 (LMP Unknown) No 28.12 kg/m2         Blood Pressure from Last 3 Encounters:   11/15/18 121/77   10/30/18 111/75   10/10/18 116/75    Weight from Last 3 Encounters:   11/15/18 169 lb (76.7 kg)   10/30/18 165 lb 6.4 oz (75 kg)   10/10/18 160 lb (72.6 kg)              Today, you had the following     No orders found for display       Primary Care Provider Office Phone # Fax #    [facility] [phone]       [address]  [facility] [address]        Equal Access to Services     GIOVANNI MTZ : Tucker Bryan, wayonida geo, qaybta kaalmada carlos, angelina diego. So [facility] [phone].    ATENCIÓN: Si charlene bardales,  tiene a piedra disposición servicios gratuitos de asistencia lingüística. Henny kent [phone].    We comply with applicable federal civil rights laws and [place] laws. We do not discriminate on the basis of race, color, national origin, age, disability, sex, sexual orientation, or gender identity.            Thank you!     Thank you for choosing [facility]  for your care. Our goal is always to provide you with excellent care. Hearing back from our patients is one way we can continue to improve our services. Please take a few minutes to complete the written survey that you may receive in the mail after your visit with us. Thank you!             Your Updated Medication List - Protect others around you: Learn how to safely use, store and throw away your medicines at www.disposemymeds.org.          This list is accurate as of 11/15/18 10:30 AM.  Always use your most recent med list.                   Brand Name Dispense Instructions for use Diagnosis    prenatal multivitamin plus iron 27-0.8 MG Tabs per tablet      Take 1 tablet by mouth daily

## 2018-11-15 NOTE — PROGRESS NOTES
No signif signs, symptoms or concerns. Here with son. Advice appropriate to gestational age reviewed including pertinent Checklist items. Discussed condition, tests and care plan including RBA. Problem list updated.   A/P:  Ely was seen today for prenatal care.    Diagnoses and all orders for this visit:    Supervision of other normal pregnancy, antepartum        Kelvin Waddell MD

## 2018-11-15 NOTE — PATIENT INSTRUCTIONS
If you have any questions regarding your visit, Please contact your care team.     [facility] Services: [phone]    Women s Health CLINIC HOURS TELEPHONE NUMBER       DON Murrieta-      Duncan Wills-Medical Assistant       Monday-[place]  8:00a.m-4:45 p.m  Tuesday-[place]  9:00a.m-11:45 a.m  Wednesday-[place] 8:00a.m-4:45 p.m.  Thursday-[place]  8:00a.m-4:45 p.m.  Friday-[place]  8:00a.m-4:45 p.m. [facility]  [address] [place] [address]  [phone] ask [facility]  [phone] Fax  Imaging [phone]    [facility] Labor and Delivery  [address] [place] [address] [place] [address]  [phone]    [facility]  [address] [place] [address]  [phone] [facility]  [phone] Fax  Imaging [phone]     Urgent Care locations:    [place]        [place] Monday-Friday  5 pm - 9 pm  Saturday and Sunday   9 am - 5 pm    Monday-Friday   11 am - 9 pm  Saturday and Sunday   9 am - 5 pm   [phone]       If you need a medication refill, please contact your pharmacy. Please allow 3 business days for your refill to be completed.  As always, Thank you for trusting us with your healthcare needs!

## 2018-11-26 ENCOUNTER — TELEPHONE (OUTPATIENT)
Dept: OBGYN | Facility: CLINIC | Age: 33
End: 2018-11-26

## 2018-11-26 NOTE — TELEPHONE ENCOUNTER
Reason for Call:  Same Day Appointment, Requested Provider:  Dr. Kelvin Waddell    PCP: Clinic, [facility]    Reason for visit: 42 WK checkup    Additional comments: Pt calling for she was told to schedule her 42 WK checkup but she cannot see Dr. Waddell until 12/12/18 and would like to see if he can fit Pt into his schedule for tomorrow 11/27/18.      Can we leave a detailed message on this number? YES     Phone number patient can be reached at: Home number on file [phone] (home)    Best Time: anytime    Call taken on 11/26/2018 at 10:45 AM by Jasson Saeed

## 2018-11-26 NOTE — TELEPHONE ENCOUNTER
Returned call to patient. She is scheduled tomorrow at 8:45 a.m with Dr. Waddell in [place].    Johann Beasley CMA

## 2018-11-27 ENCOUNTER — PRENATAL OFFICE VISIT (OUTPATIENT)
Dept: OBGYN | Facility: CLINIC | Age: 33
End: 2018-11-27
Payer: COMMERCIAL

## 2018-11-27 VITALS
WEIGHT: 167 LBS | BODY MASS INDEX: 27.79 KG/M2 | DIASTOLIC BLOOD PRESSURE: 76 MMHG | HEART RATE: 84 BPM | SYSTOLIC BLOOD PRESSURE: 111 MMHG | TEMPERATURE: 97.8 F

## 2018-11-27 DIAGNOSIS — Z34.80 SUPERVISION OF OTHER NORMAL PREGNANCY, ANTEPARTUM: ICD-10-CM

## 2018-11-27 PROCEDURE — 99207 ZZC PRENATAL VISIT: CPT | Performed by: OBSTETRICS & GYNECOLOGY

## 2018-11-27 NOTE — PROGRESS NOTES
No signif signs, symptoms or concerns except recovered from gastroenteritis. Home move completed. Occasional BH contractions. Here with son. Advice appropriate to gestational age reviewed including pertinent Checklist items. Discussed condition, tests and care plan including RBA. Problem list updated.   A/P:  Ely was seen today for prenatal care.    Diagnoses and all orders for this visit:    Supervision of other normal pregnancy, antepartum        Kelvin Waddell MD

## 2018-11-27 NOTE — MR AVS SNAPSHOT
After Visit Summary   11/27/2018    Ely Lott    MRN: [id number]           Patient Information     Date Of Birth          1985        Visit Information        Provider Department      11/27/2018 9:00 AM Kelvin Waddell MD [facility]        Today's Diagnoses     Supervision of other normal pregnancy, antepartum          Care Instructions                                                        If you have any questions regarding your visit, Please contact your care team.     [facility] Services: [phone]    [facility] CLINIC HOURS TELEPHONE NUMBER       Kelvin Waddell M.D.      Marley-      Uzma-MARIAN Salazar-MARIAN Wills-Medical Assistant       Monday-[place]  8:00a.m-4:45 p.m  [facility]  9:00a.m-11:45 a.m  Wednesday-[place] 8:00a.m-4:45 p.m.  Thursday-[place]  8:00a.m-4:45 p.m.  [facility]  8:00a.m-4:45 p.m. [facility]  [address] [place]  [place], [place] [address] [phone] ask [facility] [place]  [phone] Fax  Imaging [phone]    [facility] Labor and Delivery  [address] [place] [address] Dr.  [place], [place] [address] [phone]    [facility]  [address] [facility] [place] [address] [phone] [facility]  [phone] Fax  Imaging [phone]     Urgent Care locations:    [facility] Monday-Friday  5 pm - 9 pm  Saturday and Sunday   9 am - 5 pm    Monday-Friday   11 am - 9 pm  Saturday and Sunday   9 am - 5 pm   [phone]       If you need a medication refill, please contact your pharmacy. Please allow 3 business days for your refill to be completed.  As always, Thank you for trusting us with your healthcare needs!            Follow-ups after your visit        Follow-up notes from your care team     Return in about 2 weeks (around 12/11/2018).      Your next 10 appointments already scheduled      Nov 27, 2018  9:00 AM CST   ESTABLISHED PRENATAL with Kelvin Waddell MD   [facility] ([facility])    [address] [facility] [address] [phone]              Who to contact     If you have questions or need follow up information about today's clinic visit or your schedule please contact [facility] directly at [phone].  Normal or non-critical lab and imaging results will be communicated to you by Idylishart, letter or phone within 4 business days after the clinic has received the results. If you do not hear from us within 7 days, please contact the clinic through Elastifilet or phone. If you have a critical or abnormal lab result, we will notify you by phone as soon as possible.  Submit refill requests through Stilnest or call your pharmacy and they will forward the refill request to us. Please allow 3 business days for your refill to be completed.          Additional Information About Your Visit        Stilnest Information     Stilnest gives you secure access to your electronic health record. If you see a primary care provider, you can also send messages to your care team and make appointments. If you have questions, please call your primary care clinic.  If you do not have a primary care provider, please call [phone] and they will assist you.        Care EveryWhere ID     This is your Care EveryWhere ID. This could be used by other organizations to access your [facility] medical records  [id number]        Your Vitals Were     Pulse Temperature Last Period Breastfeeding? BMI (Body Mass Index)       84 97.8  F (36.6  C) (Oral) (LMP Unknown) No 27.79 kg/m2        Blood Pressure from Last 3 Encounters:   11/27/18 111/76   11/15/18 121/77   10/30/18 111/75    Weight from Last 3 Encounters:   11/27/18 167 lb (75.8 kg)   11/15/18 169 lb (76.7 kg)   10/30/18 165 lb 6.4 oz (75 kg)              Today, you had the following     No  orders found for display       Primary Care Provider Office Phone # Fax #    [facility] [phone]       [address]  [facility] [address]        Equal Access to Services     GIOVANNI MTZ : Hadii naomi ku haddovo Soomaali, waaxda luqadaha, qaybta kaalmada adeegyada, waxay idiin haymelyn michelle montelongo laarmen diego. So [facility] [phone].    ATENCIÓN: Si habla español, tiene a piedra disposición servicios gratuitos de asistencia lingüística. Llame al [phone].    We comply with applicable federal civil rights laws and [place] laws. We do not discriminate on the basis of race, color, national origin, age, disability, sex, sexual orientation, or gender identity.            Thank you!     Thank you for choosing [facility]  for your care. Our goal is always to provide you with excellent care. Hearing back from our patients is one way we can continue to improve our services. Please take a few minutes to complete the written survey that you may receive in the mail after your visit with us. Thank you!             Your Updated Medication List - Protect others around you: Learn how to safely use, store and throw away your medicines at www.disposemymeds.org.          This list is accurate as of 11/27/18  8:59 AM.  Always use your most recent med list.                   Brand Name Dispense Instructions for use Diagnosis    pediatric multivitamin w/iron 27-0.8 MG tablet      Take 1 tablet by mouth daily

## 2018-12-13 ENCOUNTER — PRENATAL OFFICE VISIT (OUTPATIENT)
Dept: OBGYN | Facility: CLINIC | Age: 33
End: 2018-12-13
Payer: COMMERCIAL

## 2018-12-13 VITALS
HEART RATE: 99 BPM | BODY MASS INDEX: 29.12 KG/M2 | SYSTOLIC BLOOD PRESSURE: 116 MMHG | WEIGHT: 175 LBS | DIASTOLIC BLOOD PRESSURE: 76 MMHG

## 2018-12-13 DIAGNOSIS — Z34.80 SUPERVISION OF OTHER NORMAL PREGNANCY, ANTEPARTUM: ICD-10-CM

## 2018-12-13 PROCEDURE — 99207 ZZC PRENATAL VISIT: CPT | Performed by: OBSTETRICS & GYNECOLOGY

## 2018-12-13 NOTE — PATIENT INSTRUCTIONS
If you have any questions regarding your visit, Please contact your care team.     [facility]: [phone]    [facility] CLINIC HOURS TELEPHONE NUMBER       Kelvin Waddell M.D.      Marley-    Duncan Wills-Medical Assistant       Monday-[place]  8:00a.m-4:45 p.m  Tuesday-[place]  9:00a.m-11:45 a.m  Wednesday-[place] 8:00a.m-4:45 p.m.  Thursday-[place]  8:00a.m-4:45 p.m.  Friday-[place]  8:00a.m-4:45 p.m. [facility]  [address] [place] [address]  [phone] ask for [facility]  [phone] Fax  Imaging [phone]    [facility] Labor and Delivery  [address] [place] [address] [place] [address]  [phone]    [facility]  [address] [place] [address]  [phone] ask [facility]  [phone] Fax  Imaging [phone]     Urgent Care locations:    [facility] Monday-Friday  5 pm - 9 pm  Saturday and Sunday   9 am - 5 pm    Monday-Friday   11 am - 9 pm  Saturday and Sunday   9 am - 5 pm   [phone]       If you need a medication refill, please contact your pharmacy. Please allow 3 business days for your refill to be completed.  As always, Thank you for trusting us with your healthcare needs!

## 2018-12-13 NOTE — PROGRESS NOTES
No signif signs, symptoms or concerns except some backache. Here with family. Advice appropriate to gestational age reviewed including pertinent Checklist items. Discussed condition, tests and care plan including RBA. Problem list updated. GBS next.  A/P:  Ely was seen today for prenatal care.    Diagnoses and all orders for this visit:    Supervision of other normal pregnancy, antepartum        Kelvin Waddell MD

## 2018-12-27 NOTE — PATIENT INSTRUCTIONS
If you have any questions regarding your visit, Please contact your care team.     [facility]: [phone]    [facility] CLINIC HOURS TELEPHONE NUMBER       Kelvin Waddell M.D.      Marley-    Duncan Wills-Medical Assistant       Monday-[place]  8:00a.m-4:45 p.m  Tuesday-[place]  9:00a.m-11:45 a.m  Wednesday-[place] 8:00a.m-4:45 p.m.  Thursday-[place]  8:00a.m-4:45 p.m.  Friday-[place]  8:00a.m-4:45 p.m. [facility]  [address] [place] [address]  [phone] ask for [facility]  [phone] Fax  Imaging [phone]    [facility] Labor and Delivery  [address] [place] [address] [place] [address]  [phone]    [facility]  [address] [place] [address]  [phone] ask [facility]  [phone] Fax  Imaging [phone]     Urgent Care locations:    [facility] Monday-Friday  5 pm - 9 pm  Saturday and Sunday   9 am - 5 pm    Monday-Friday   11 am - 9 pm  Saturday and Sunday   9 am - 5 pm   [phone]       If you need a medication refill, please contact your pharmacy. Please allow 3 business days for your refill to be completed.  As always, Thank you for trusting us with your healthcare needs!

## 2019-01-02 ENCOUNTER — PRENATAL OFFICE VISIT (OUTPATIENT)
Dept: OBGYN | Facility: CLINIC | Age: 34
End: 2019-01-02
Payer: COMMERCIAL

## 2019-01-02 VITALS
BODY MASS INDEX: 29.29 KG/M2 | SYSTOLIC BLOOD PRESSURE: 118 MMHG | DIASTOLIC BLOOD PRESSURE: 78 MMHG | WEIGHT: 176 LBS | HEART RATE: 83 BPM

## 2019-01-02 DIAGNOSIS — Z34.80 SUPERVISION OF OTHER NORMAL PREGNANCY, ANTEPARTUM: ICD-10-CM

## 2019-01-02 PROCEDURE — 87653 STREP B DNA AMP PROBE: CPT | Performed by: OBSTETRICS & GYNECOLOGY

## 2019-01-02 PROCEDURE — 99207 ZZC PRENATAL VISIT: CPT | Performed by: OBSTETRICS & GYNECOLOGY

## 2019-01-02 NOTE — PROGRESS NOTES
No signif signs, symptoms or concerns. Here with family. Advice appropriate to gestational age reviewed including pertinent Checklist items. Discussed condition, tests and care plan including RBA. Problem list updated. Preop next.  A/P:  Ely was seen today for prenatal care.    Diagnoses and all orders for this visit:    Supervision of other normal pregnancy, antepartum  -     Group B strep PCR        Kelvin Waddell MD

## 2019-01-03 LAB
GP B STREP DNA SPEC QL NAA+PROBE: NEGATIVE
SPECIMEN SOURCE: NORMAL

## 2019-01-09 ENCOUNTER — PRENATAL OFFICE VISIT (OUTPATIENT)
Dept: OBGYN | Facility: CLINIC | Age: 34
End: 2019-01-09
Payer: COMMERCIAL

## 2019-01-09 VITALS
DIASTOLIC BLOOD PRESSURE: 74 MMHG | HEART RATE: 85 BPM | SYSTOLIC BLOOD PRESSURE: 110 MMHG | WEIGHT: 176 LBS | BODY MASS INDEX: 29.29 KG/M2 | TEMPERATURE: 99 F

## 2019-01-09 DIAGNOSIS — Z34.80 SUPERVISION OF OTHER NORMAL PREGNANCY, ANTEPARTUM: ICD-10-CM

## 2019-01-09 PROCEDURE — 99207 ZZC PRENATAL VISIT: CPT | Performed by: OBSTETRICS & GYNECOLOGY

## 2019-01-09 NOTE — PATIENT INSTRUCTIONS
If you have any questions regarding your visit, Please contact your care team.     [facility]: [phone]    [facility] CLINIC HOURS TELEPHONE NUMBER       Kelvin Waddell M.D.      Marley-    Duncan Wills-Medical Assistant       Monday-[place]  8:00a.m-4:45 p.m  Tuesday-[place]  9:00a.m-11:45 a.m  Wednesday-[place] 8:00a.m-4:45 p.m.  Thursday-[place]  8:00a.m-4:45 p.m.  Friday-[place]  8:00a.m-4:45 p.m. [facility]  [address] [place] [address]  [phone] ask for [facility]  [phone] Fax  Imaging [phone]    [facility] Labor and Delivery  [address] [place] [address] [place] [address]  [phone]    [facility]  [address] [place] [address]  [phone] ask [facility]  [phone] Fax  Imaging [phone]     Urgent Care locations:    [facility] Monday-Friday  5 pm - 9 pm  Saturday and Sunday   9 am - 5 pm    Monday-Friday   11 am - 9 pm  Saturday and Sunday   9 am - 5 pm   [phone]       If you need a medication refill, please contact your pharmacy. Please allow 3 business days for your refill to be completed.  As always, Thank you for trusting us with your healthcare needs!

## 2019-01-10 NOTE — PROGRESS NOTES
No signif signs, symptoms or concerns except Labor and Delivery evaluation was neg for SROM. Here with family.  The patient denies allergies, anesthesia problems, bleeding tendencies, corticosteroids, diabetes, thromboembolic phenonmenon, rheumatic fever, glaucoma, heart murmur, hepatitis, herbal supplements, recent illnesses, implanted devices, body jewelry, Latex sensitivity, transfusions, and tobacco use except for the following: PCN allergy.  Exam: Head, Neck, Airway, Heart, and Lungs: all neg/normal except class 2 airway.  Satis pre-anesthetic medical exam. Preop instructions given.  Advice appropriate to gestational age reviewed including pertinent Checklist items. Discussed condition, tests and care plan including RBA. Problem list updated.   A/P:  Ely was seen today for prenatal care and pre-op exam.    Diagnoses and all orders for this visit:    Supervision of other normal pregnancy, antepartum        Kelvin Waddell MD

## 2019-01-23 ENCOUNTER — TELEPHONE (OUTPATIENT)
Dept: OBGYN | Facility: CLINIC | Age: 34
End: 2019-01-23

## 2019-01-23 NOTE — TELEPHONE ENCOUNTER
Reason for Call:  Other     Detailed comments: Geronimoient has questions about .    Phone Number Patient can be reached at: Home number on file [phone] (home)    Best Time: ANYTIME    Can we leave a detailed message on this number? YES    Call taken on 2019 at 11:47 AM by Chuy Blackburn

## 2019-01-23 NOTE — TELEPHONE ENCOUNTER
Pt had a  on 19.  She states yesterday that she had some sharp pain in her abdomen.  Today the pain is better and she is able manage the pain with Ibuprofen and ice.  She states she did have some vaginal bleeding today and hasn't had any for a couple of days.  She states she noticed some bleeding in the toilet.  She just put on a pad.  I told pt to monitor her bleeding, if she is filling a pad in less than an hour then she should be seen right away.  If her pain worsens then she should be seen.  Pt denies any signs of infection on her incision.    Rosa Isela Priest RN

## 2019-02-08 ENCOUNTER — TELEPHONE (OUTPATIENT)
Dept: OBGYN | Facility: CLINIC | Age: 34
End: 2019-02-08

## 2019-02-08 NOTE — TELEPHONE ENCOUNTER
Phone call placed to pt.  Pt reports post c/s 1/14/2019.  C/o clear drainage coming from middle of incision and part of skin reddish.  Pt denies foul oder from drainage and denies fever.  Pt also denies incision tender to touch.  Pt advised to monitor, keep clean and dry. Keep open to air.  If symptoms develop schedule to be seen or present to urgent care/ED after hours.  Pt verbalized understanding and agrees with plan of care. Vince Mejia RN on 2/8/2019 at 10:37 AM

## 2019-02-08 NOTE — TELEPHONE ENCOUNTER
..Reason for Call:  Incision site issue    Detailed comments: Patient had  on 2019 and the  incision site is draining very small amount of clear liquid(middle of incision) and the end part/stitch is protruding, irritating skin//red; is an appointment required?  JGB is out; please advise    Phone Number Patient can be reached at: Cell number on file:    Telephone Information:   Mobile [phone]       Best Time: anytime    Can we leave a detailed message on this number? YES    Call taken on 2019 at 9:30 AM by Jenny Urban

## 2019-02-27 ENCOUNTER — PRENATAL OFFICE VISIT (OUTPATIENT)
Dept: OBGYN | Facility: CLINIC | Age: 34
End: 2019-02-27
Payer: COMMERCIAL

## 2019-02-27 VITALS
BODY MASS INDEX: 26.63 KG/M2 | WEIGHT: 160 LBS | DIASTOLIC BLOOD PRESSURE: 79 MMHG | HEART RATE: 77 BPM | SYSTOLIC BLOOD PRESSURE: 119 MMHG

## 2019-02-27 PROBLEM — Z34.80 SUPERVISION OF OTHER NORMAL PREGNANCY, ANTEPARTUM: Status: RESOLVED | Noted: 2018-07-08 | Resolved: 2019-02-27

## 2019-02-27 PROCEDURE — 99207 ZZC POST PARTUM EXAM: CPT | Performed by: OBSTETRICS & GYNECOLOGY

## 2019-02-27 ASSESSMENT — PATIENT HEALTH QUESTIONNAIRE - PHQ9: SUM OF ALL RESPONSES TO PHQ QUESTIONS 1-9: 2

## 2019-02-27 NOTE — PATIENT INSTRUCTIONS
If you have any questions regarding your visit, Please contact your care team.     [facility] [employer] Services: [phone]    [facility] CLINIC HOURS TELEPHONE NUMBER       DON [place]-    Andrei Wills-Medical Assistant       Monday-[place]  8:00a.m-4:45 p.m  Tuesday-[place]  9:00a.m-11:45 a.m  Wednesday-[place] 8:00a.m-4:45 p.m.  Thursday-[place]  8:00a.m-4:45 p.m.  Friday-[place]  8:00a.m-4:45 p.m. [facility]  [address] [place] [address]  [phone] ask [facility]  [phone] Fax  Imaging [phone]    [facility] Labor and Delivery  [address] [place] [address] [place] [address]  [phone]    [facility]  [address]  [place] [address]  [phone] ask [facility]  [phone] Fax  Imaging [phone]     Urgent Care locations:    [facility] Monday-Friday  5 pm - 9 pm  Saturday and Sunday   9 am - 5 pm    Monday-Friday   11 am - 9 pm  Saturday and Sunday   9 am - 5 pm   [phone]       If you need a medication refill, please contact your pharmacy. Please allow 3 business days for your refill to be completed.  As always, Thank you for trusting us with your healthcare needs!

## 2019-02-28 NOTE — PROGRESS NOTES
Ely Lott is a 33 year old year old G 2 P 2 who is here today for a postpartum exam.    HPI:      Doing well and without signif sx or concerns. Currently breast feeding infant. Here today alone. Infant doing well. Contraceptive method planned is condoms and possible vas. NSA since delivery. PP depression screening as noted. See PHQ-9. Score = 2.    Past medical, obstetrical, surgical, family and social history reviewed and as noted or updated in chart.     Allergies, meds and supplements are as noted or updated in chart.      ROS:     Systems reviewed include constitutional; breast;                 cardiac; respiratory; gastrointestinal; genitourinary;                                musculoskeletal; integumentary; psychological;                                hematologic/lymphatic and endocrine.                  These systems were negative for significant symptoms except                 for the following: none and see HPI.                                Exam:  VS as noted.                    Abd was                             normal or negative except for, or in particular noting, the following                pertinent findings: none. Wd A.       Assessment: Postpartum exam    Plan and Recommendations: Symptoms, problems and concerns reviewed.  Discussed pregnancy, birth, future pregnancy plans, work plans and infant care issues.  Problem list updated and Pregnancy Episode closed. See orders. RTC in 6 months for annual exam and Pap/HRHPV follow-up.    Ely was seen today for post partum exam.    Diagnoses and all orders for this visit:    Routine postpartum follow-up        Kelvin Waddell MD

## 2019-06-30 NOTE — PATIENT INSTRUCTIONS
If you have any questions regarding your visit, Please contact your care team.     [facility]: [phone]    [facility] CLINIC HOURS TELEPHONE NUMBER   DON [place]ri[place]-    Lakisha Gusman-MARIAN Wills-Medical Assistant   Monday-[place]  8:00a.m-4:45 p.m  Wednesday-[place] 8:00a.m-4:45 p.m.  Thursday-[place]  8:00a.m-4:45 p.m.  Friday-[place]  8:00a.m-4:45 p.m. [facility]  [address]  [place], [place] [address] [phone] ask [facility] [place] [facility]  [phone] Fax  Imaging [phone]    [facility] Labor and Delivery  [address] [place] [address] Dr.  [place], [place] [address] [phone]    [facility]  [address] [place] [address]  [phone] ask [facility] [place] [facility]  [phone] Fax  Imaging [phone]     Urgent Care locations:    [place]        [place] Monday-Friday  5 pm - 9 pm  Saturday and Sunday   9 am - 5 pm    Monday-Friday   11 am - 9 pm  Saturday and Sunday   9 am - 5 pm   [phone]       If you need a medication refill, please contact your pharmacy. Please allow 3 business days for your refill to be completed.  As always, Thank you for trusting us with your healthcare needs!    
Norma

## 2019-08-07 ENCOUNTER — OFFICE VISIT (OUTPATIENT)
Dept: FAMILY MEDICINE | Facility: CLINIC | Age: 34
End: 2019-08-07
Payer: COMMERCIAL

## 2019-08-07 VITALS
HEART RATE: 88 BPM | SYSTOLIC BLOOD PRESSURE: 119 MMHG | OXYGEN SATURATION: 98 % | WEIGHT: 151 LBS | HEIGHT: 65 IN | RESPIRATION RATE: 16 BRPM | TEMPERATURE: 97.5 F | BODY MASS INDEX: 25.16 KG/M2 | DIASTOLIC BLOOD PRESSURE: 89 MMHG

## 2019-08-07 DIAGNOSIS — R07.89 CHEST WALL PAIN: ICD-10-CM

## 2019-08-07 DIAGNOSIS — Z00.00 ROUTINE GENERAL MEDICAL EXAMINATION AT A HEALTH CARE FACILITY: Primary | ICD-10-CM

## 2019-08-07 DIAGNOSIS — Z12.4 SCREENING FOR MALIGNANT NEOPLASM OF CERVIX: ICD-10-CM

## 2019-08-07 LAB — GLUCOSE SERPL-MCNC: 95 MG/DL (ref 70–99)

## 2019-08-07 PROCEDURE — 99395 PREV VISIT EST AGE 18-39: CPT | Performed by: NURSE PRACTITIONER

## 2019-08-07 PROCEDURE — 82947 ASSAY GLUCOSE BLOOD QUANT: CPT | Performed by: NURSE PRACTITIONER

## 2019-08-07 PROCEDURE — 87624 HPV HI-RISK TYP POOLED RSLT: CPT | Performed by: NURSE PRACTITIONER

## 2019-08-07 PROCEDURE — G0145 SCR C/V CYTO,THINLAYER,RESCR: HCPCS | Performed by: NURSE PRACTITIONER

## 2019-08-07 PROCEDURE — 36415 COLL VENOUS BLD VENIPUNCTURE: CPT | Performed by: NURSE PRACTITIONER

## 2019-08-07 PROCEDURE — 99213 OFFICE O/P EST LOW 20 MIN: CPT | Mod: 25 | Performed by: NURSE PRACTITIONER

## 2019-08-07 ASSESSMENT — MIFFLIN-ST. JEOR: SCORE: 1395.81

## 2019-08-07 ASSESSMENT — PAIN SCALES - GENERAL: PAINLEVEL: MILD PAIN (2)

## 2019-08-07 NOTE — PATIENT INSTRUCTIONS
Preventive Health Recommendations  Female Ages 26 - 39  Yearly exam:   See your health care provider every year in order to    Review health changes.     Discuss preventive care.      Review your medicines if you your doctor has prescribed any.    Until age 30: Get a Pap test every three years (more often if you have had an abnormal result).    After age 30: Talk to your doctor about whether you should have a Pap test every 3 years or have a Pap test with HPV screening every 5 years.   You do not need a Pap test if your uterus was removed (hysterectomy) and you have not had cancer.  You should be tested each year for STDs (sexually transmitted diseases), if you're at risk.   Talk to your provider about how often to have your cholesterol checked.  If you are at risk for diabetes, you should have a diabetes test (fasting glucose).  Shots: Get a flu shot each year. Get a tetanus shot every 10 years.   Nutrition:     Eat at least 5 servings of fruits and vegetables each day.    Eat whole-grain bread, whole-wheat pasta and brown rice instead of white grains and rice.    Get adequate Calcium and Vitamin D.     Lifestyle    Exercise at least 150 minutes a week (30 minutes a day, 5 days of the week). This will help you control your weight and prevent disease.    Limit alcohol to one drink per day.    No smoking.     Wear sunscreen to prevent skin cancer.    See your dentist every six months for an exam and cleaning.      At [facility], we strive to deliver an exceptional experience to you, every time we see you.  If you receive a survey in the mail, please send us back your thoughts. We really do value your feedback.    Based on your medical history, these are the current health maintenance/preventive care services that you are due for (some may have been done at this visit.)  Health Maintenance Due   Topic Date Due     URINE DRUG SCREEN  1985     PREVENTIVE CARE VISIT  01/09/2016     PAP   04/18/2019     SILVIA ASSESSMENT  05/22/2019         Suggested websites for health information:  Www.Visualmarksview.org : Up to date and easily searchable information on multiple topics.  Www.medlineplus.gov : medication info, interactive tutorials, watch real surgeries online  Www.familydoctor.org : good info from the Academy of Family Physicians  Www.cdc.gov : public health info, travel advisories, epidemics (H1N1)  Www.aap.org : children's health info, normal development, vaccinations  Www.health.[facility].[place].us : [place] dept of health, public health issues in [place], N1N1    Your care team:                            Family Medicine Internal Medicine   MD Lavelle Soler MD Shantel Branch-Fleming, MD Katya Georgiev PA-C Nam Ho, MD Pediatrics   RE Alanis, STERLING Franz APRN CNP   MD Shwetha Marlow MD Deborah Mielke, MD Kim Thein, APRN [facility]      Clinic hours: Monday - Thursday 7 am-7 pm; Fridays 7 am-5 pm.   Urgent care: Monday - Friday 11 am-9 pm; Saturday and Sunday 9 am-5 pm.  Pharmacy : Monday -Thursday 8 am-8 pm; Friday 8 am-6 pm; Saturday and Sunday 9 am-5 pm.     Clinic: [phone]   Pharmacy: [phone]      Patient Education     Noncardiac Chest Pain    Based on your visit today, the healthcare provider doesn t know what is causing your chest pain. In most cases, people who come to the emergency department with chest pain don t have a problem with their heart. Instead, the pain is caused by other conditions. It's important for the healthcare team to be sure you are not having a life threatening cause for chest pain such as a heart attack, blood clot in the lungs, collapsed lung, ruptured esophagus, or tearing of the aorta. Once these major causes have been ruled out, you may have further evaluation for non-heart causes of chest pain. These may be problems with the lungs, muscles, bones, digestive tract, nerves, or mental health.  Lung  problems    Inflammation around the lungs (pleurisy)    Collapsed lung (pneumothorax)    Fluid around the lungs (pleural effusion)    Lung cancer (a rare cause of chest pain)  Muscle or bone problems    Inflamed cartilage between the ribs (costochondritis)    Fibromyalgia    Rheumatoid arthritis    Chest wall strain  Digestive system problems    Reflux    Stomach ulcer    Spasms of the esophagus    Gall stones    Gallbladder inflammation  Mental health conditions    Panic or anxiety attacks    Emotional distress  Your condition doesn t seem serious and your pain doesn t appear to be coming from your heart. But sometimes the signs of a serious problem take more time to appear. Watch for the warning signs listed below.  Home care  Follow these guidelines when caring for yourself at home:    Rest today and avoid strenuous activity.    Take any prescribed medicine as directed.  Follow-up care  Follow up with your healthcare provider, or as advised, if you don t start to feel better within 24 hours.  When to seek medical advice  Call your healthcare provider right away if any of these occur:    A change in the type of pain. Call if it feels different, becomes more serious, lasts longer, or begins to spread into your shoulder, arm, neck, jaw, or back.    Shortness of breath    You feel more pain when you breathe    Cough with dark-colored mucus or blood    Weakness, dizziness, or fainting    Fever of 100.4 F (38 C) or higher, or as directed by your healthcare provider    Swelling, pain, or redness in one leg  Date Last Reviewed: 12/1/2016 2000-2018 [employer]. [address] [place] [address]. All rights reserved. This information is not intended as a substitute for professional medical care. Always follow your healthcare professional's instructions.
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
## 2019-08-09 LAB
COPATH REPORT: NORMAL
PAP: NORMAL

## 2019-08-13 LAB
FINAL DIAGNOSIS: NORMAL
HPV HR 12 DNA CVX QL NAA+PROBE: NEGATIVE
HPV16 DNA SPEC QL NAA+PROBE: NEGATIVE
HPV18 DNA SPEC QL NAA+PROBE: NEGATIVE
SPECIMEN DESCRIPTION: NORMAL
SPECIMEN SOURCE CVX/VAG CYTO: NORMAL

## 2019-11-30 ENCOUNTER — OFFICE VISIT (OUTPATIENT)
Dept: URGENT CARE | Facility: URGENT CARE | Age: 34
End: 2019-11-30
Payer: COMMERCIAL

## 2019-11-30 VITALS
RESPIRATION RATE: 18 BRPM | TEMPERATURE: 97.6 F | BODY MASS INDEX: 24.3 KG/M2 | DIASTOLIC BLOOD PRESSURE: 76 MMHG | HEART RATE: 87 BPM | SYSTOLIC BLOOD PRESSURE: 110 MMHG | OXYGEN SATURATION: 100 % | WEIGHT: 146 LBS

## 2019-11-30 DIAGNOSIS — H66.002 NON-RECURRENT ACUTE SUPPURATIVE OTITIS MEDIA OF LEFT EAR WITHOUT SPONTANEOUS RUPTURE OF TYMPANIC MEMBRANE: Primary | ICD-10-CM

## 2019-11-30 PROCEDURE — 99213 OFFICE O/P EST LOW 20 MIN: CPT | Performed by: NURSE PRACTITIONER

## 2019-11-30 RX ORDER — CEFDINIR 300 MG/1
300 CAPSULE ORAL 2 TIMES DAILY
Qty: 20 CAPSULE | Refills: 0 | Status: SHIPPED | OUTPATIENT
Start: 2019-11-30 | End: 2019-12-01

## 2019-11-30 ASSESSMENT — ENCOUNTER SYMPTOMS
DIARRHEA: 0
RHINORRHEA: 1
VOMITING: 0
ACTIVITY CHANGE: 0
FATIGUE: 1
WHEEZING: 0
SORE THROAT: 0
FEVER: 0
SHORTNESS OF BREATH: 0
COUGH: 0
CHILLS: 0
DYSURIA: 0
APPETITE CHANGE: 0
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
## 2019-11-30 NOTE — PATIENT INSTRUCTIONS
Patient Education     Middle Ear Infection (Adult)  You have an infection of the middle ear, the space behind the eardrum. This is also called acute otitis media (AOM). Sometimes it is caused by the common cold. This is because congestion can block the internal passage (eustachian tube) that drains fluid from the middle ear. When the middle ear fills with fluid, bacteria can grow there and cause an infection. Oral antibiotics are used to treat this illness, not ear drops. Symptoms usually start to improve within 1 to 2 days of treatment.    Home care  The following are general care guidelines:    Finish all of the antibiotic medicine given, even though you may feel better after the first few days.    You may use over-the-counter medicine, such as acetaminophen or ibuprofen, to control pain and fever, unless something else was prescribed. If you have chronic liver or kidney disease or have ever had a stomach ulcer or gastrointestinal bleeding, talk with your healthcare provider before using these medicines. Do not give aspirin to anyone under 18 years of age who has a fever. It may cause severe illness or death.  Follow-up care  Follow up with your healthcare provider, or as advised, in 2 weeks if all symptoms have not gotten better, or if hearing doesn't go back to normal within 1 month.  When to seek medical advice  Call your healthcare provider right away if any of these occur:    Ear pain gets worse or does not improve after 3 days of treatment    Unusual drowsiness or confusion    Neck pain, stiff neck, or headache    Fluid or blood draining from the ear canal    Fever of 100.4 F (38 C) or as advised     Seizure  Date Last Reviewed: 6/1/2016 2000-2018 [employer]. [address] [place] [address], [place] [address]. All rights reserved. This information is not intended as a substitute for professional medical care. Always follow your healthcare professional's instructions.

## 2019-12-01 ENCOUNTER — NURSE TRIAGE (OUTPATIENT)
Dept: NURSING | Facility: CLINIC | Age: 34
End: 2019-12-01

## 2019-12-01 RX ORDER — DOXYCYCLINE 100 MG/1
100 TABLET ORAL 2 TIMES DAILY
Qty: 20 TABLET | Refills: 0 | Status: SHIPPED | OUTPATIENT
Start: 2019-12-01 | End: 2020-03-12

## 2019-12-01 RX ORDER — DOXYCYCLINE 100 MG/1
100 TABLET ORAL DAILY
Qty: 10 TABLET | Refills: 0 | Status: SHIPPED | OUTPATIENT
Start: 2019-12-01 | End: 2019-12-01

## 2019-12-01 NOTE — TELEPHONE ENCOUNTER
"Pt calls in a second time >  requesting a different medication for her middle ear infection   Was prescribed > cefdinir (OMNICEF) 300 MG capsule      Took 1 dose yesterday am   1 dose last night     And now complains of lip swelling  Itchiness  Throat \" strain \"    Took 1 benadryl   Denies any breathing difficulty presently     [facility] called   Spoke to Eliana who will relay message to   Joelle Grover APRN CNP      Please send new RX to [facility] [place] - [address]    Pt called back and given the above information     Protocol and care advice reviewed  Caller states understanding of the recommended disposition  Advised to call back if further questions or concerns    Jacob Mchugh RN / [facility]    Reason for Disposition    Caller has NON-URGENT medication question about med that PCP prescribed and triager unable to answer question    Protocols used: MEDICATION QUESTION CALL-A-AH      "

## 2019-12-10 ENCOUNTER — OFFICE VISIT (OUTPATIENT)
Dept: FAMILY MEDICINE | Facility: CLINIC | Age: 34
End: 2019-12-10
Payer: COMMERCIAL

## 2019-12-10 VITALS
BODY MASS INDEX: 24.49 KG/M2 | WEIGHT: 147 LBS | RESPIRATION RATE: 18 BRPM | SYSTOLIC BLOOD PRESSURE: 116 MMHG | TEMPERATURE: 97.5 F | OXYGEN SATURATION: 98 % | HEIGHT: 65 IN | DIASTOLIC BLOOD PRESSURE: 78 MMHG | HEART RATE: 85 BPM

## 2019-12-10 DIAGNOSIS — H66.90 ACUTE OTITIS MEDIA, UNSPECIFIED OTITIS MEDIA TYPE: Primary | ICD-10-CM

## 2019-12-10 PROCEDURE — 99213 OFFICE O/P EST LOW 20 MIN: CPT | Performed by: PHYSICIAN ASSISTANT

## 2019-12-10 ASSESSMENT — MIFFLIN-ST. JEOR: SCORE: 1372.67

## 2019-12-10 ASSESSMENT — PAIN SCALES - GENERAL: PAINLEVEL: SEVERE PAIN (6)

## 2019-12-10 NOTE — PATIENT INSTRUCTIONS
At [facility], we strive to deliver an exceptional experience to you, every time we see you.  If you receive a survey in the mail, please send us back your thoughts. We really do value your feedback.    Based on your medical history, these are the current health maintenance/preventive care services that you are due for (some may have been done at this visit.)  Health Maintenance Due   Topic Date Due     URINE DRUG SCREEN  1986     INFLUENZA VACCINE (1) 09/01/2019         Suggested websites for health information:  Www.[employer]True Office : Up to date and easily searchable information on multiple topics.  Www.[employer].gov : medication info, interactive tutorials, watch real surgeries online  Www.familydoctor.org : good info from the Academy of Family Physicians  Www.cdc.gov : public health info, travel advisories, epidemics (H1N1)  Www.aap.org : children's health info, normal development, vaccinations  Www.health.[facility].[place].us : [place] dept of health, public health issues in [place], N1N1    Your care team:                            Family Medicine Internal Medicine   MD Lavelle Soler MD Shantel Branch-Fleming, MD Katya Georgiev PA-C Nam Ho, MD Pediatrics   RE Alanis, STERLING GAN CNP   MD Shwetha Marlow MD Deborah Mielke, MD Kim Thein, APRN [facility]      Clinic hours: Monday - Thursday 7 am-7 pm; Fridays 7 am-5 pm.   Urgent care: Monday - Friday 11 am-9 pm; Saturday and Sunday 9 am-5 pm.  Pharmacy : Monday -Thursday 8 am-8 pm; Friday 8 am-6 pm; Saturday and Sunday 9 am-5 pm.     Clinic: [phone]   Pharmacy: [phone]    TRIAL over the counter PSEUDOEPHEDRINE     Patient Education     Earache, No Infection (Adult)  Earaches can happen without an infection. This occurs when air and fluid build up behind the eardrum causing a feeling of fullness and discomfort and reduced hearing. This is called otitis media with  effusion (OME) or serous otitis media. It means there is fluid in the middle ear. It is not the same as acute otitis media, which is typically from infection.  OME can happen when you have a cold if congestion blocks the passage that drains the middle ear. This passage is called the eustachian tube. OME may also occur with nasal allergies or after a bacterial middle ear infection.    The pain or discomfort may come and go. You may hear clicking or popping sounds when you chew or swallow. You may feel that your balance is off. Or you may hear ringing in the ear.  It often takes from several weeks up to 3 months for the fluid to clear on its own. Oral pain relievers and ear drops help if there is pain. Decongestants and antihistamines sometimes help. Antibiotics don't help since there is no infection. Your doctor may prescribe a nasal spray to help reduce swelling in the nose and eustachian tube. This can allow the ear to drain.  If your OME doesn't improve after 3 months, surgery may be used to drain the fluid and insert a small tube in the eardrum to allow continued drainage.  Because the middle ear fluid can become infected, it is important to watch for signs of an ear infection which may develop later. These signs include increased ear pain, fever, or drainage from the ear.  Home care  The following guidelines will help you care for yourself at home:    You may use over-the-counter medicine as directed to control pain, unless another medicine was prescribed. If you have chronic liver or kidney disease or ever had a stomach ulcer or GI bleeding, talk with your doctor before using these medicines. Aspirin should never be used in anyone under 18 years of age who is ill with a fever. It may cause severe liver damage.    You may use over-the-counter decongestants such as phenylephrine or pseudoephedrine. But they are not always helpful. Don't use nasal spray decongestants more than 3 days. Longer use can make  congestion worse. Prescription nasal sprays from your doctor don't typically have those restrictions.    Antihistamines may help if you are also having allergy symptoms.    You may use medicines such as guaifenesin to thin mucus and promote drainage.  Follow-up care  Follow up with your healthcare provider or as advised if you are not feeling better after 3 days.  When to seek medical advice  Call your healthcare provider right away if any of the following occur:    Your ear pain gets worse or does not start to improve     Fever of 100.4 F (38 C) or higher, or as directed by your healthcare provider    Fluid or blood draining from the ear    Headache or sinus pain    Stiff neck    Unusual drowsiness or confusion  Date Last Reviewed: 10/1/2016    [id number] [employer]. [address] [place] [address], [place] [address]. All rights reserved. This information is not intended as a substitute for professional medical care. Always follow your healthcare professional's instructions.
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
## 2020-02-23 ENCOUNTER — HEALTH MAINTENANCE LETTER (OUTPATIENT)
Age: 35
End: 2020-02-23

## 2020-03-12 ENCOUNTER — OFFICE VISIT (OUTPATIENT)
Dept: FAMILY MEDICINE | Facility: CLINIC | Age: 35
End: 2020-03-12
Payer: COMMERCIAL

## 2020-03-12 VITALS
SYSTOLIC BLOOD PRESSURE: 131 MMHG | DIASTOLIC BLOOD PRESSURE: 85 MMHG | WEIGHT: 148 LBS | HEIGHT: 65 IN | OXYGEN SATURATION: 100 % | RESPIRATION RATE: 16 BRPM | BODY MASS INDEX: 24.66 KG/M2 | HEART RATE: 84 BPM | TEMPERATURE: 97.5 F

## 2020-03-12 DIAGNOSIS — M75.42 IMPINGEMENT SYNDROME OF SHOULDER REGION, LEFT: Primary | ICD-10-CM

## 2020-03-12 PROCEDURE — 99213 OFFICE O/P EST LOW 20 MIN: CPT | Performed by: NURSE PRACTITIONER

## 2020-03-12 RX ORDER — CYCLOBENZAPRINE HCL 10 MG
10 TABLET ORAL 3 TIMES DAILY PRN
Qty: 30 TABLET | Refills: 0 | Status: SHIPPED | OUTPATIENT
Start: 2020-03-12 | End: 2021-06-07

## 2020-03-12 ASSESSMENT — MIFFLIN-ST. JEOR: SCORE: 1377.2

## 2020-03-12 ASSESSMENT — PAIN SCALES - GENERAL: PAINLEVEL: MODERATE PAIN (4)

## 2020-03-12 NOTE — PROGRESS NOTES
Subjective     Ely Lott is a 34 year old female who presents to clinic today for the following health issues:    HPI     Joint Pain      Onset: 3/9/20    Description:   Location: L shoulder  Character: sharp    Intensity: 4/10 (7/10 with movement)    Progression of Symptoms: same    Accompanying Signs & Symptoms:  Other symptoms: worse at night/trying to sleep    History:   Previous similar pain: no       Precipitating factors:     Trauma or overuse: YES - lifting son    Alleviating factors:  Improved by: NA    Therapies Tried and outcome: chiropractor, ibuprofen -   Patient lifted her 2 boys in to their car seat (ages 1 and 2) and had immediate pain anterior shoulder afterward.  She's been using Ibuprofen and ice without improvement in symptoms.      Patient Active Problem List   Diagnosis     ASCUS favor benign     Chronic neck pain     Past Surgical History:   Procedure Laterality Date      SECTION  2019     IR RHIZOTOMY  ,        Social History     Tobacco Use     Smoking status: Never Smoker     Smokeless tobacco: Never Used   Substance Use Topics     Alcohol use: Yes     Comment: not in PG     Family History   Problem Relation Age of Onset     Asthma Mother      Diabetes Father      Myocardial Infarction Father 53     Coronary Artery Disease Father      Myocardial Infarction Paternal Uncle      Coronary Artery Disease Paternal Uncle      Hyperlipidemia No family hx of      Breast Cancer No family hx of      Colon Cancer No family hx of      Depression No family hx of      Anxiety Disorder No family hx of      Thyroid Disease No family hx of          Current Outpatient Medications   Medication Sig Dispense Refill     Prenatal Vit-Fe Fumarate-FA (PRENATAL MULTIVITAMIN  PLUS IRON) 27-0.8 MG TABS per tablet Take 1 tablet by mouth daily       BP Readings from Last 3 Encounters:   20 131/85   12/10/19 116/78   19 110/76    Wt Readings from Last 3 Encounters:   20 67.1 kg (148  "lb)   12/10/19 66.7 kg (147 lb)   11/30/19 66.2 kg (146 lb)                      Reviewed and updated as needed this visit by Provider         Review of Systems   ROS COMP: Constitutional, HEENT, cardiovascular, pulmonary, gi and gu systems are negative, except as otherwise noted.      Objective    /85 (BP Location: Left arm, Patient Position: Sitting, Cuff Size: Adult Regular)   Pulse 84   Temp 97.5  F (36.4  C) (Oral)   Resp 16   Ht 1.651 m (5' 5\")   Wt 67.1 kg (148 lb)   SpO2 100%   BMI 24.63 kg/m    Body mass index is 24.63 kg/m .  Physical Exam   GENERAL: healthy, alert and no distress  EYES: Eyes grossly normal to inspection, PERRL and conjunctivae and sclerae normal  HENT: ear canals and TM's normal, nose and mouth without ulcers or lesions  NECK: no adenopathy, no asymmetry, masses, or scars and thyroid normal to palpation  RESP: lungs clear to auscultation - no rales, rhonchi or wheezes  CV: regular rate and rhythm, normal S1 S2, no S3 or S4, no murmur, click or rub, no peripheral edema and peripheral pulses strong  ABDOMEN: soft, nontender, no hepatosplenomegaly, no masses and bowel sounds normal  MS: Left shoulder- no obvious deformity, swelling, erythema, warmth, TTP anterior capsule, decreased flexion above shoulder level, pain with resisted abduction, IR and ER, decreased strength due to pain, normal sensation disatally, normal radial and ulnar pulses, cap refill  3 sec, otherwise,  no gross musculoskeletal defects noted, no edema  SKIN: no suspicious lesions or rashes  NEURO: Normal strength and tone, mentation intact and speech normal  BACK: no CVA tenderness, no paralumbar tenderness  PSYCH: mentation appears normal, affect normal/bright  LYMPH: normal ant/post cervical, supraclavicular nodes    Diagnostic Test Results:  Labs reviewed in Epic  none         Assessment & Plan     1. Impingement syndrome of shoulder region, left  Concern for possible rotator curr injury, will have her " treat with Ibuprofen 600 mg every 6 hours with food and flexeril prn pain/spasm, continue to use ice, and return to clinic 4 weeks or  sooner for re evaluation if symptoms persist, would get MRI and send to Orthopedics.  - cyclobenzaprine (FLEXERIL) 10 MG tablet; Take 1 tablet (10 mg) by mouth 3 times daily as needed for muscle spasms  Dispense: 30 tablet; Refill: 0       Work on weight loss  Regular exercise  See Patient Instructions    Return in about 4 weeks (around 4/9/2020), or if symptoms worsen or fail to improve, for Routine Visit.    MALIK Sheldon [facility]

## 2020-03-12 NOTE — PATIENT INSTRUCTIONS
Patient Education     Understanding Shoulder Impingement Syndrome    Shoulder impingement syndrome is a problem with the shoulder joint. It occurs when certain parts within the joint swell and are pinched. This can cause nagging pain and problems with moving the arm.  What causes shoulder impingement syndrome?  It is possible to develop impingement after years of normal shoulder use. But in most cases the condition occurs because of repeated overhead movements. These include such things as stocking shelves, painting, swimming, and throwing. These movements can irritate parts of the shoulder, leading to swelling. Swollen parts of the shoulder take up more room, making the joint space smaller. Some parts that may be involved include:    A sac of fluid (bursa) that cushions the shoulder joint.  When the bursa is irritated, it may lead to a condition called bursitis. This is when the bursa swells with fluid, filling and squeezing the joint space.    Fibrous tissues (tendons) that connect muscle to bone. When tendons are irritated, they may become swollen. This is called tendonitis.    The end (acromion) of the shoulder blade. This bone may be flat or hooked. If the acromion is hooked, the joint space may be smaller than normal. Growths (bone spurs) on the acromion can also narrow the joint space. Acromion problems don t often cause impingement. But they can make it worse.  Symptoms of shoulder impingement syndrome  Symptoms include pain, pinching, or stiffness in your shoulder. Pain often comes with movement, particularly reaching overhead or backward. It may also be felt when the shoulder is at rest. Pain at night during sleep is common.  Treatment for shoulder impingement syndrome  Treatment will depend on the cause of the problem, how bad the problem is, and if other parts of the shoulder are damaged. Treatment may include the following:    Active rest. This allows the shoulder to heal. It means using the arm and  shoulder, but avoiding activities that cause pain. These likely include reaching overhead or sleeping on your shoulder.    Cold packs. These help reduce swelling and relieve pain.    Prescription or over-the-counter medicines. These help relieve pain and swelling. NSAIDs (nonsteroidal anti-inflammatory drugs) are the most common medicines used. Medicines may be prescribed or bought over-the-counter. They may be given as pills. Or they may be applied to the skin in the form of a gel, cream, or patch.    Arm and shoulder exercises. These help keep your shoulder joint mobile as it heals. They also help improve muscle strength around the joint.    Shots of medicine into the joint. This can help reduce swelling and pain for a time. The medicine used is usually a corticosteroid, (a powerful anti-inflammatory).  If other measures don t work to relieve symptoms, you may need surgery. This opens up space in the joint to allow pain-free motion.  Possible complications of shoulder impingement syndrome  It might be tempting to stop using your shoulder completely to avoid pain. But doing so may lead to a condition called frozen shoulder. To help prevent this, follow instructions you are given for active rest and for doing exercises to help your shoulder heal.  When to call your healthcare provider  Call your healthcare provider right away if you have any of these:    Fever of 100.4 F (38 C) or higher, chills, oras directed    Symptoms that don t get better, or get worse    New symptoms  Date Last Reviewed: 3/10/2016    [id number] [employer] [address] [place] [address], [place] [address]. All rights reserved. This information is not intended as a substitute for professional medical care. Always follow your healthcare professional's instructions.

## 2020-08-02 ENCOUNTER — MYC MEDICAL ADVICE (OUTPATIENT)
Dept: FAMILY MEDICINE | Facility: CLINIC | Age: 35
End: 2020-08-02

## 2020-12-02 NOTE — TELEPHONE ENCOUNTER
Left detailed message for patient since demo's stated ok. Asked her for a few more details of the questions before sending off to Dr. Waddell to review    Huong Tobar  Women's Health           Subjective:  More lethargic this morning, hypotensive.  Remains on multiple pressors.  Urine output remains robust, more than 2 L in last 24 hours.  No nausea vomiting fevers or chills or chest pain    Objective:  Visit Vitals  /60   Pulse 76   Temp 98.6 °F (37 °C)   Resp 16   Ht 5' 7\" (1.702 m)   Wt 79.8 kg (175 lb 14.8 oz)   SpO2 99%   BMI 27.55 kg/m²         Intake/Output Summary (Last 24 hours) at 12/2/2020 1115  Last data filed at 12/2/2020 0700  Gross per 24 hour   Intake 2029.61 ml   Output 2315 ml   Net -285.39 ml        General: NAD     CV: RRR    Resp: Decreased to the bases     GI: Soft, +bs    Ext: 1+ edema    : no suprapubic fullness    Skin: No rash    Neuro: Alert and Oriented x3     Heme: No bruise    • sodium chloride       • calcium gluconate IVPB  2 g Intravenous Once   • pantoprazole  40 mg Oral 2 times per day   • albumin human (SPA)  25 g Intravenous 4 times per day   • piperacillin-tazobactam (ZOSYN) extended interval IVPB  3.375 g Intravenous 3 times per day   • sertraline  100 mg Oral Daily   • ARIPiprazole  15 mg Oral Nightly   • sodium chloride (PF)  2 mL Intracatheter 2 times per day        Recent Labs   Lab 12/02/20  0400 12/01/20  1450 12/01/20  0900 12/01/20  0200 11/30/20  1713   WBC 18.4* 20.5* 17.2* 11.9* 12.8*   HGB 7.0* 7.7* 7.6* 7.0* 6.4*    279 247 235 254   INR  --   --   --  1.4 1.4     Recent Labs   Lab 12/02/20  0400 12/01/20  1450 12/01/20  0900 12/01/20  0200 11/30/20  1713   SODIUM 131* 134* 132* 130* 128*   POTASSIUM 3.8 3.5 3.6 2.3* 2.2*   CHLORIDE 93* 93* 91* 87* 83*   CO2 25 23 27 28 30   BUN 93* 110* 118* 135* 133*   CREATININE 1.72* 2.06* 2.23* 2.55* 2.68*   CALCIUM 6.8* 5.9* 6.0* 5.6* 5.2*   MG 3.1* 3.2*  --  2.4 1.9       A/P:   The patient is a 64-year-old female with a past medical history significant for CAD status post CABG and PCI, CHF with a preserved EF, pulmonary hypertension, severe TR, A. fib, hypertension, PAD, bilateral renal artery  stenosis status post intervention, and mild CKD stage III with baseline creatinine 1.3-1.4, consult for acute kidney injury and hypokalemia.     1.  Acute kidney injury on CKD stage III  Baseline creatinine 1.3-1.4, history of acute kidney injury requiring dialysis in the past but has since then recovered her renal function.  Creatinine elevation at this time likely secondary to hypotension, hemodynamic insult in the setting of possible GI bleed and cardiogenic shock.  -Although she does have some degree of lower extremity edema, she likely is intravascularly volume depleted.  She is already receiving some crystalloids throughout the necessary drips, will re-dose albumin x3 doses  -Goal systolic blood pressure over 110 to ensure renal perfusion  -No mention of hydronephrosis on abdominal ultrasound  -Hold diuretics for now     2.  Hypokalemia  Likely from a mix of loose bowel movements, losses in the stool along with loop diuretics. Improved     3.  Hypocalcemia  Corrects to 7.4, will replete.  Her phosphorus has come down nicely with improvement of renal function and her calcium Phos product is now less than 55 so there is less of a concern when replating the calcium     4.  Hyponatremia  Likely secondary to volume depletion, all has improved     5.  Renal artery stenosis  Longstanding history.  She apparently had intervention with stenting at [facility] back in 2003 to the left kidney.  Did have multiple episodes of flash pulmonary edema as well as CTAs which did lead to intervention on the other side I believe     6.  Renal osteodystrophy  Phosphorus proved, no need for binders     7.  Hypotension  Multifactorial from GI bleed, shock.  Continue pressor support     8.  Anemia  Iron stores depleted, she is receiving a transfusion today.  Will hold on IV iron given concern of infection for the time being, start oral if she can tolerate

## 2020-12-06 ENCOUNTER — HEALTH MAINTENANCE LETTER (OUTPATIENT)
Age: 35
End: 2020-12-06

## 2020-12-16 ENCOUNTER — NURSE TRIAGE (OUTPATIENT)
Dept: FAMILY MEDICINE | Facility: CLINIC | Age: 35
End: 2020-12-16

## 2020-12-16 NOTE — TELEPHONE ENCOUNTER
"Nursing action:  Due to the constant pressure and chest pain that is worsened by taking a deep breathing in on her left side and family history (father) of a heart attack at age 54 and per protocol the patient is to call 911. That patient has 2 small children and will need to call her mom who she states is 6 minutes away after calling 911. The patient became tearful after the advice and I tried to offer consoling support.  Patient verbalizes good understanding, agrees with plan and states she needs no further support. Shania Regalado R.N.      Reason for Disposition    Chest pain lasting longer than 5 minutes and ANY of the following:* Over 50 years old* Over 30 years old and at least one cardiac risk factor (i.e., high blood pressure, diabetes, high cholesterol, obesity, smoker or strong family history of heart disease)* Pain is crushing, pressure-like, or heavy * Took nitroglycerin and chest pain was not relieved* History of heart disease (i.e., angina, heart attack, bypass surgery, angioplasty, CHF)    Additional Information    Negative: Severe difficulty breathing (e.g., struggling for each breath, speaks in single words)    Negative: Passed out (i.e., fainted, collapsed and was not responding)    Answer Assessment - Initial Assessment Questions  1. LOCATION: \"Where does it hurt?\"        Left sided under her breast  2. RADIATION: \"Does the pain go anywhere else?\" (e.g., into neck, jaw, arms, back)      It does not radiate  3. ONSET: \"When did the chest pain begin?\" (Minutes, hours or days)       It began about 1 week ago and for the last few it has gotten worse   4. PATTERN \"Does the pain come and go, or has it been constant since it started?\"  \"Does it get worse with exertion?\"       It is pretty constant, when she takes a deep breath it gets tight (under her breast)  5. DURATION: \"How long does it last\" (e.g., seconds, minutes, hours)      constant  6. SEVERITY: \"How bad is the pain?\"  (e.g., Scale 1-10; mild, " "moderate, or severe)     - MILD (1-3): doesn't interfere with normal activities      - MODERATE (4-7): interferes with normal activities or awakens from sleep     - SEVERE (8-10): excruciating pain, unable to do any normal activities        2-3/10 constant pain   7. CARDIAC RISK FACTORS: \"Do you have any history of heart problems or risk factors for heart disease?\" (e.g., prior heart attack, angina; high blood pressure, diabetes, being overweight, high cholesterol, smoking, or strong family history of heart disease)      Father had heart attack @ age 54   8. PULMONARY RISK FACTORS: \"Do you have any history of lung disease?\"  (e.g., blood clots in lung, asthma, emphysema, birth control pills)      No  9. CAUSE: \"What do you think is causing the chest pain?\"      Unknown no injuries.  Has started weight lifting but did that prior to this pain onset  10. OTHER SYMPTOMS: \"Do you have any other symptoms?\" (e.g., dizziness, nausea, vomiting, sweating, fever, difficulty breathing, cough)        no  11. PREGNANCY: \"Is there any chance you are pregnant?\" \"When was your last menstrual period?\"       Unknown - possible but would be very early - not actively trying    Protocols used: CHEST PAIN-A-OH      "

## 2021-02-03 ENCOUNTER — PRENATAL OFFICE VISIT (OUTPATIENT)
Dept: OBGYN | Facility: CLINIC | Age: 36
End: 2021-02-03
Payer: COMMERCIAL

## 2021-02-03 VITALS
SYSTOLIC BLOOD PRESSURE: 129 MMHG | WEIGHT: 148 LBS | OXYGEN SATURATION: 100 % | BODY MASS INDEX: 24.63 KG/M2 | DIASTOLIC BLOOD PRESSURE: 84 MMHG | HEART RATE: 88 BPM

## 2021-02-03 DIAGNOSIS — Z34.80 SUPERVISION OF OTHER NORMAL PREGNANCY, ANTEPARTUM: Primary | ICD-10-CM

## 2021-02-03 DIAGNOSIS — O34.219 PREVIOUS CESAREAN DELIVERY, ANTEPARTUM CONDITION OR COMPLICATION: ICD-10-CM

## 2021-02-03 LAB
ABO + RH BLD: NORMAL
ABO + RH BLD: NORMAL
BLD GP AB SCN SERPL QL: NORMAL
BLOOD BANK CMNT PATIENT-IMP: NORMAL
ERYTHROCYTE [DISTWIDTH] IN BLOOD BY AUTOMATED COUNT: 11.6 % (ref 10–15)
HCG UR QL: POSITIVE
HCT VFR BLD AUTO: 41.9 % (ref 35–47)
HGB BLD-MCNC: 13.6 G/DL (ref 11.7–15.7)
MCH RBC QN AUTO: 30.3 PG (ref 26.5–33)
MCHC RBC AUTO-ENTMCNC: 32.5 G/DL (ref 31.5–36.5)
MCV RBC AUTO: 93 FL (ref 78–100)
PLATELET # BLD AUTO: 227 10E9/L (ref 150–450)
RBC # BLD AUTO: 4.49 10E12/L (ref 3.8–5.2)
SPECIMEN EXP DATE BLD: NORMAL
WBC # BLD AUTO: 5.8 10E9/L (ref 4–11)

## 2021-02-03 PROCEDURE — 99213 OFFICE O/P EST LOW 20 MIN: CPT | Performed by: OBSTETRICS & GYNECOLOGY

## 2021-02-03 PROCEDURE — 86850 RBC ANTIBODY SCREEN: CPT | Performed by: OBSTETRICS & GYNECOLOGY

## 2021-02-03 PROCEDURE — 36415 COLL VENOUS BLD VENIPUNCTURE: CPT | Performed by: OBSTETRICS & GYNECOLOGY

## 2021-02-03 PROCEDURE — 81025 URINE PREGNANCY TEST: CPT | Performed by: OBSTETRICS & GYNECOLOGY

## 2021-02-03 PROCEDURE — 99000 SPECIMEN HANDLING OFFICE-LAB: CPT | Performed by: OBSTETRICS & GYNECOLOGY

## 2021-02-03 PROCEDURE — 86900 BLOOD TYPING SEROLOGIC ABO: CPT | Performed by: OBSTETRICS & GYNECOLOGY

## 2021-02-03 PROCEDURE — 86762 RUBELLA ANTIBODY: CPT | Performed by: OBSTETRICS & GYNECOLOGY

## 2021-02-03 PROCEDURE — 86901 BLOOD TYPING SEROLOGIC RH(D): CPT | Performed by: OBSTETRICS & GYNECOLOGY

## 2021-02-03 PROCEDURE — 86780 TREPONEMA PALLIDUM: CPT | Mod: 90 | Performed by: OBSTETRICS & GYNECOLOGY

## 2021-02-03 PROCEDURE — 87389 HIV-1 AG W/HIV-1&-2 AB AG IA: CPT | Performed by: OBSTETRICS & GYNECOLOGY

## 2021-02-03 PROCEDURE — 85027 COMPLETE CBC AUTOMATED: CPT | Performed by: OBSTETRICS & GYNECOLOGY

## 2021-02-03 PROCEDURE — 87340 HEPATITIS B SURFACE AG IA: CPT | Performed by: OBSTETRICS & GYNECOLOGY

## 2021-02-03 PROCEDURE — 87086 URINE CULTURE/COLONY COUNT: CPT | Performed by: OBSTETRICS & GYNECOLOGY

## 2021-02-03 SDOH — HEALTH STABILITY: MENTAL HEALTH: HOW OFTEN DO YOU HAVE 6 OR MORE DRINKS ON ONE OCCASION?: NOT ASKED

## 2021-02-03 SDOH — HEALTH STABILITY: MENTAL HEALTH: HOW MANY STANDARD DRINKS CONTAINING ALCOHOL DO YOU HAVE ON A TYPICAL DAY?: NOT ASKED

## 2021-02-03 SDOH — HEALTH STABILITY: MENTAL HEALTH: HOW OFTEN DO YOU HAVE A DRINK CONTAINING ALCOHOL?: NOT ASKED

## 2021-02-03 NOTE — PATIENT INSTRUCTIONS
If you have any questions regarding your visit, Please contact your care team.     numberFireWoodbridge Foodcloud Services: [phone]  Women s Health CLINIC HOURS TELEPHONE NUMBER       Kelvin Waddell M.D.    Katherine-MARIAN Natarajan-MARIAN Wills-Medical Assistant    Ely-  Jayla-     Monday-[place]  8:00a.m-4:45 p.m  Tuesday-[place]  9:00a.m-4:00 p.m  Wednesday-[place] 8:00a.m-4:45 p.m.  Thursday-[place]  8:00a.m-4:45 p.m.  Friday-[place]  8:00a.m-4:45 p.m. [facility]  [address] [place] [id number] [phone] ask for [facility]  [phone] Fax  Imaging [phone]    [facility]  [address]  [place], [place] [address] [phone]    [facility]  [address]  [place] [address] [phone] ask [facility]  [phone] Fax  Imaging [phone]     Urgent Care locations:    [place]        [place] Monday-Friday  5 pm - 9 pm  Saturday and Sunday   9 am - 5 pm  Monday-Friday   11 am - 9 pm  Saturday and Sunday   9 am - 5 pm   [phone]   If you need a medication refill, please contact your pharmacy. Please allow 3 business days for your refill to be completed.  As always, Thank you for trusting us with your healthcare needs!

## 2021-02-03 NOTE — PROGRESS NOTES
"\"Lizbeth\" Ely Lott is a 35 year old year old G 3 P 2 who presents for an initial obstetrical visit.  Referred by self.    Currently experiencing normal pregnancy symptoms without particular problems including pain, bleeding, marked vomiting or weight loss except: no exceptions.  This was a planned pregnancy.  Here today alone.  Additional concerns: chiropractic care, past  section and plans repeat- risks, benefits,and alternatives reviewed and understood.  Family doing well.      ROS:     Systems reviewed include constitutional; breast;                 cardiac; respiratory; gastrointestinal; genitourinary;                                musculoskeletal; integumentary; psychological;                                hematologic/lymphatic and endocrine.                  These systems were negative for significant symptoms except                 for the following: chronic neck pain, uses chiropractic care q2 weeks with good result, Lizbeth to update chiropractor about pregnancy and see above HPI.    Past medical, obstetrical, surgical, family and social history reviewed and as noted or updated in chart.     Allergies, meds and supplements are as noted or updated in chart.                    Episode OB dating, demographic and history forms completed or reviewed.    EXAM:  VS as noted. BMI- Body mass index is 24.63 kg/m .        Ely was seen today for prenatal care.    Diagnoses and all orders for this visit:    Supervision of other normal pregnancy, antepartum  -     ABO/Rh type and screen  -     CBC with platelets  -     Hepatitis B surface antigen  -     Rubella Antibody IgG Quantitative  -     Urine Culture Aerobic Bacterial  -     HIV Antigen Antibody Combo  -     Treponema Abs w Reflex to RPR and Titer  -     HCG Qual, Urine ([id number])  -     US OB < 14 Weeks Single; Future    Previous  delivery, antepartum condition or complication  -     US OB < 14 Weeks Single; Future        PLAN: Counseling " included the following: Advice appropriate to gestational age reviewed including pertinent Checklist items. Discussed condition, tests and general care plan. Symptoms, problems and concerns reviewed. Recommended weight gain discussed. Problem list initiated.   30 minutes spent on the date of encounter doing chart review, history, examination, discussion with patient, and documentation, and further activities as noted above, and review of appropriateness of decision-making for care.      Check ultrasound in 2 weeks. Pap due in 3 years. Orders as noted. RTC in 4-6 weeks. Discuss special screening tests next.    Nikki Olivas RN NP Student  I was present with the student who participated in the service and in the documentation of the note. I have verified the history and personally performed the physical exam, medical decision making, and provided counseling in the time period documented. I agree with the assessment and plan of care as documented in the note.  Kelvin Waddell MD

## 2021-02-04 LAB
BACTERIA SPEC CULT: NO GROWTH
HBV SURFACE AG SERPL QL IA: NONREACTIVE
HIV 1+2 AB+HIV1 P24 AG SERPL QL IA: NONREACTIVE
Lab: NORMAL
SPECIMEN SOURCE: NORMAL
T PALLIDUM AB SER QL: NONREACTIVE

## 2021-02-05 LAB — RUBV IGG SERPL IA-ACNC: 47 IU/ML

## 2021-02-06 PROBLEM — O34.219 PREVIOUS CESAREAN DELIVERY, ANTEPARTUM CONDITION OR COMPLICATION: Status: ACTIVE | Noted: 2021-02-06

## 2021-02-06 PROBLEM — Z34.80 SUPERVISION OF OTHER NORMAL PREGNANCY, ANTEPARTUM: Status: ACTIVE | Noted: 2021-02-06

## 2021-02-08 DIAGNOSIS — O20.0 THREATENED SPONTANEOUS ABORTION: ICD-10-CM

## 2021-02-08 LAB — B-HCG SERPL-ACNC: 42 IU/L (ref 0–5)

## 2021-02-08 PROCEDURE — 84702 CHORIONIC GONADOTROPIN TEST: CPT | Performed by: OBSTETRICS & GYNECOLOGY

## 2021-02-08 PROCEDURE — 36415 COLL VENOUS BLD VENIPUNCTURE: CPT | Performed by: OBSTETRICS & GYNECOLOGY

## 2021-02-12 DIAGNOSIS — O20.0 THREATENED SPONTANEOUS ABORTION: ICD-10-CM

## 2021-02-12 LAB — B-HCG SERPL-ACNC: 7 IU/L (ref 0–5)

## 2021-02-12 PROCEDURE — 36415 COLL VENOUS BLD VENIPUNCTURE: CPT | Performed by: OBSTETRICS & GYNECOLOGY

## 2021-02-12 PROCEDURE — 84702 CHORIONIC GONADOTROPIN TEST: CPT | Performed by: OBSTETRICS & GYNECOLOGY

## 2021-06-07 ENCOUNTER — PRENATAL OFFICE VISIT (OUTPATIENT)
Dept: OBGYN | Facility: CLINIC | Age: 36
End: 2021-06-07
Payer: COMMERCIAL

## 2021-06-07 VITALS
HEIGHT: 65 IN | HEART RATE: 92 BPM | DIASTOLIC BLOOD PRESSURE: 80 MMHG | SYSTOLIC BLOOD PRESSURE: 122 MMHG | BODY MASS INDEX: 24.71 KG/M2 | WEIGHT: 148.3 LBS

## 2021-06-07 DIAGNOSIS — O09.529 ANTEPARTUM MULTIGRAVIDA OF ADVANCED MATERNAL AGE: ICD-10-CM

## 2021-06-07 DIAGNOSIS — Z34.80 SUPERVISION OF OTHER NORMAL PREGNANCY, ANTEPARTUM: Primary | ICD-10-CM

## 2021-06-07 DIAGNOSIS — O34.219 PREVIOUS CESAREAN DELIVERY, ANTEPARTUM CONDITION OR COMPLICATION: ICD-10-CM

## 2021-06-07 LAB
ABO + RH BLD: NORMAL
ABO + RH BLD: NORMAL
BLD GP AB SCN SERPL QL: NORMAL
BLOOD BANK CMNT PATIENT-IMP: NORMAL
ERYTHROCYTE [DISTWIDTH] IN BLOOD BY AUTOMATED COUNT: 11.7 % (ref 10–15)
HCT VFR BLD AUTO: 37.7 % (ref 35–47)
HGB BLD-MCNC: 13 G/DL (ref 11.7–15.7)
MCH RBC QN AUTO: 31 PG (ref 26.5–33)
MCHC RBC AUTO-ENTMCNC: 34.5 G/DL (ref 31.5–36.5)
MCV RBC AUTO: 90 FL (ref 78–100)
PLATELET # BLD AUTO: 203 10E9/L (ref 150–450)
RBC # BLD AUTO: 4.2 10E12/L (ref 3.8–5.2)
SPECIMEN EXP DATE BLD: NORMAL
WBC # BLD AUTO: 7.5 10E9/L (ref 4–11)

## 2021-06-07 PROCEDURE — 86901 BLOOD TYPING SEROLOGIC RH(D): CPT | Performed by: OBSTETRICS & GYNECOLOGY

## 2021-06-07 PROCEDURE — 86803 HEPATITIS C AB TEST: CPT | Performed by: OBSTETRICS & GYNECOLOGY

## 2021-06-07 PROCEDURE — 86762 RUBELLA ANTIBODY: CPT | Performed by: OBSTETRICS & GYNECOLOGY

## 2021-06-07 PROCEDURE — 87086 URINE CULTURE/COLONY COUNT: CPT | Performed by: OBSTETRICS & GYNECOLOGY

## 2021-06-07 PROCEDURE — 86850 RBC ANTIBODY SCREEN: CPT | Performed by: OBSTETRICS & GYNECOLOGY

## 2021-06-07 PROCEDURE — 86780 TREPONEMA PALLIDUM: CPT | Performed by: OBSTETRICS & GYNECOLOGY

## 2021-06-07 PROCEDURE — 99207 PR FIRST OB VISIT: CPT | Performed by: OBSTETRICS & GYNECOLOGY

## 2021-06-07 PROCEDURE — 86900 BLOOD TYPING SEROLOGIC ABO: CPT | Performed by: OBSTETRICS & GYNECOLOGY

## 2021-06-07 PROCEDURE — 99000 SPECIMEN HANDLING OFFICE-LAB: CPT | Performed by: OBSTETRICS & GYNECOLOGY

## 2021-06-07 PROCEDURE — 36415 COLL VENOUS BLD VENIPUNCTURE: CPT | Performed by: OBSTETRICS & GYNECOLOGY

## 2021-06-07 PROCEDURE — 85027 COMPLETE CBC AUTOMATED: CPT | Performed by: OBSTETRICS & GYNECOLOGY

## 2021-06-07 PROCEDURE — 87389 HIV-1 AG W/HIV-1&-2 AB AG IA: CPT | Performed by: OBSTETRICS & GYNECOLOGY

## 2021-06-07 PROCEDURE — 87340 HEPATITIS B SURFACE AG IA: CPT | Performed by: OBSTETRICS & GYNECOLOGY

## 2021-06-07 ASSESSMENT — MIFFLIN-ST. JEOR: SCORE: 1380.43

## 2021-06-07 NOTE — PROGRESS NOTES
Ely Lott is a 35 year old year old G 4 P 2 who presents for an initial obstetrical visit.  Referred by self.    Currently experiencing normal pregnancy symptoms without particular problems including pain, bleeding, marked vomiting or weight loss except: mod nausea.  This was a planned pregnancy.  Here today with .  Additional concerns: recent SAB x 1, previous shoulder dystocia and fourth degree laceration followed by  section- plans repeat  section, AMA.  Family doing well.   Discussed special diagnostic and screening tests and plans (y = yes, n = no/declined, p = possibly/considering, d = done already, na = not applicable or past time): first trimester scr with NT and later AFP or with cell free DNA and later AFP = p, cell free DNA= p, CF carrier scr = n, hemoglobinopathy scr= n, SMA, fragile X  and other genetic scr= n, genetic amnio/CVS = n, quad scr = y, survey u/s = n, Level 2 survey u/s with MFM = y.       ROS:     Systems reviewed include constitutional; breast;                 cardiac; respiratory; gastrointestinal; genitourinary;                                musculoskeletal; integumentary; psychological;                                hematologic/lymphatic and endocrine.                  These systems were negative for significant symptoms except                 for the following: none and see above HPI.    Past medical, obstetrical, surgical, family and social history reviewed and as noted or updated in chart.     Allergies, meds and supplements are as noted or updated in chart.                    Episode OB dating, demographic and history forms completed or reviewed.    EXAM:  VS as noted. BMI- Body mass index is 24.35 kg/m .    Relatively recent normal general exam- not repeated now.       Ely was seen today for prenatal care.    Diagnoses and all orders for this visit:    Supervision of other normal pregnancy, antepartum  -     ABO/Rh type and screen  -     CBC with  platelets  -     Hepatitis B surface antigen  -     Rubella Antibody IgG Quantitative  -     Urine Culture Aerobic Bacterial  -     HIV Antigen Antibody Combo  -     Treponema Abs w Reflex to RPR and Titer  -     Hepatitis C antibody  -     US OB <14 Weeks w Transvaginal Single; Future    Previous  delivery, antepartum condition or complication    Antepartum multigravida of advanced maternal age        PLAN: Counseling included the following: Advice appropriate to gestational age reviewed including pertinent Checklist items. Discussed condition, tests and general care plan. Symptoms, problems and concerns reviewed. Recommended weight gain discussed. Problem list initiated.   30 minutes spent on the date of encounter doing chart review, history, discussion with patient, and documentation, and further activities as noted, and review of appropriateness of decision-making for care, and review of test results, and interpretation of test results.      Check ultrasound now. Pap due in 3 yrs. Orders as noted. RTC in 4 weeks.     Kelvin Waddell MD

## 2021-06-08 LAB
HBV SURFACE AG SERPL QL IA: NONREACTIVE
HCV AB SERPL QL IA: NONREACTIVE
HIV 1+2 AB+HIV1 P24 AG SERPL QL IA: NONREACTIVE
RUBV IGG SERPL IA-ACNC: 36 IU/ML
T PALLIDUM AB SER QL: NONREACTIVE

## 2021-06-10 LAB
BACTERIA SPEC CULT: NO GROWTH
Lab: NORMAL
SPECIMEN SOURCE: NORMAL

## 2021-06-11 ENCOUNTER — ANCILLARY PROCEDURE (OUTPATIENT)
Dept: ULTRASOUND IMAGING | Facility: CLINIC | Age: 36
End: 2021-06-11
Attending: OBSTETRICS & GYNECOLOGY
Payer: COMMERCIAL

## 2021-06-11 DIAGNOSIS — Z34.80 SUPERVISION OF OTHER NORMAL PREGNANCY, ANTEPARTUM: ICD-10-CM

## 2021-06-11 PROCEDURE — 76801 OB US < 14 WKS SINGLE FETUS: CPT

## 2021-06-12 PROBLEM — O20.8 SUBCHORIONIC HEMORRHAGE OF PLACENTA IN FIRST TRIMESTER: Status: ACTIVE | Noted: 2021-06-12

## 2021-07-07 ENCOUNTER — PRENATAL OFFICE VISIT (OUTPATIENT)
Dept: OBGYN | Facility: CLINIC | Age: 36
End: 2021-07-07
Payer: COMMERCIAL

## 2021-07-07 VITALS
OXYGEN SATURATION: 100 % | WEIGHT: 148.2 LBS | DIASTOLIC BLOOD PRESSURE: 79 MMHG | SYSTOLIC BLOOD PRESSURE: 124 MMHG | HEART RATE: 81 BPM | BODY MASS INDEX: 24.34 KG/M2

## 2021-07-07 DIAGNOSIS — O34.219 PREVIOUS CESAREAN DELIVERY, ANTEPARTUM CONDITION OR COMPLICATION: ICD-10-CM

## 2021-07-07 DIAGNOSIS — Z34.80 SUPERVISION OF OTHER NORMAL PREGNANCY, ANTEPARTUM: Primary | ICD-10-CM

## 2021-07-07 PROCEDURE — 99207 PR PRENATAL VISIT: CPT | Performed by: OBSTETRICS & GYNECOLOGY

## 2021-07-08 NOTE — PROGRESS NOTES
No significant signs, symptoms or concerns except subchorionic hemorrhage and mod nausea. No bleeding.    Planning vasectomy.   Counseling included the following: Advice appropriate to gestational age reviewed including pertinent Checklist items. Discussed condition, tests and care plan including risks, benefits, and alternatives. Problem list updated.   20 minutes spent on the date of encounter doing chart review, history, examination, discussion with patient, and documentation, and further activities as noted, and review of appropriateness of decision-making for care, and review of test results, and interpretation of test results.  Quad screening next.   A/P:  Ely was seen today for prenatal care.    Diagnoses and all orders for this visit:    Supervision of other normal pregnancy, antepartum    Previous  delivery, antepartum condition or complication        Kelvin Waddell MD

## 2021-08-05 ENCOUNTER — PRENATAL OFFICE VISIT (OUTPATIENT)
Dept: OBGYN | Facility: CLINIC | Age: 36
End: 2021-08-05
Payer: COMMERCIAL

## 2021-08-05 VITALS
BODY MASS INDEX: 24.47 KG/M2 | DIASTOLIC BLOOD PRESSURE: 79 MMHG | WEIGHT: 149 LBS | HEART RATE: 81 BPM | OXYGEN SATURATION: 100 % | SYSTOLIC BLOOD PRESSURE: 116 MMHG

## 2021-08-05 DIAGNOSIS — O34.219 PREVIOUS CESAREAN DELIVERY, ANTEPARTUM CONDITION OR COMPLICATION: ICD-10-CM

## 2021-08-05 DIAGNOSIS — Z34.80 SUPERVISION OF OTHER NORMAL PREGNANCY, ANTEPARTUM: ICD-10-CM

## 2021-08-05 PROCEDURE — 81511 FTL CGEN ABNOR FOUR ANAL: CPT | Mod: 90 | Performed by: OBSTETRICS & GYNECOLOGY

## 2021-08-05 PROCEDURE — 99000 SPECIMEN HANDLING OFFICE-LAB: CPT | Performed by: OBSTETRICS & GYNECOLOGY

## 2021-08-05 PROCEDURE — 36415 COLL VENOUS BLD VENIPUNCTURE: CPT | Performed by: OBSTETRICS & GYNECOLOGY

## 2021-08-05 PROCEDURE — 99207 PR PRENATAL VISIT: CPT | Performed by: OBSTETRICS & GYNECOLOGY

## 2021-08-05 NOTE — PATIENT INSTRUCTIONS
If you have any questions regarding your visit, Please contact your care team.     [employer] Services: [phone]  Women s Health CLINIC HOURS TELEPHONE NUMBER       Kelvin Waddell M.D.    Katherine-MARIAN Natarajan-MARIAN Wills-Medical Assistant    Ely-  Jayla-     Monday-[place]  8:00a.m-4:45 p.m  Tuesday-[place]  9:00a.m-4:00 p.m  Wednesday-[place] 8:00a.m-4:45 p.m.  Thursday-[place]  8:00a.m-4:45 p.m.  Friday-[place]  8:00a.m-4:45 p.m. [facility]  [address] [facility] [address] [place] [address] [phone] ask for [facility]  [phone] Fax  Imaging [phone]    [facility] Labor and Delivery  [address]  [place], [place] [address] [phone]    [facility]  [address]  [place] [address] [phone] ask [facility]  [phone] Fax  Imaging [phone]     Urgent Care locations:    [place]        [place] Monday-Friday  5 pm - 9 pm  Saturday and Sunday   9 am - 5 pm  Monday-Friday   11 am - 9 pm  Saturday and Sunday   9 am - 5 pm   [phone]   If you need a medication refill, please contact your pharmacy. Please allow 3 business days for your refill to be completed.  As always, Thank you for trusting us with your healthcare needs!

## 2021-08-05 NOTE — Clinical Note
Please arrange Level 2 ultrasound with MFM at [facility] site if possible. This is for AMA. I will sign the paper referral order later as needed. Quad screen pending.

## 2021-08-05 NOTE — PROGRESS NOTES
No significant signs, symptoms or concerns.    Counseling included the following: Advice appropriate to gestational age reviewed including pertinent Checklist items. Discussed condition, tests and care plan including risks, benefits, and alternatives. Problem list updated.   20 minutes spent on the date of encounter doing chart review, history, examination, discussion with patient, and documentation, and further activities as noted, and review of appropriateness of decision-making for care.  Level 2 ultrasound next.  A/P:  Ely was seen today for prenatal care.    Diagnoses and all orders for this visit:    Supervision of other normal pregnancy, antepartum  -     Maternal Quad Marker 2nd Trimester; Future    Previous  delivery, antepartum condition or complication        Kelvin Waddell MD

## 2021-08-07 LAB
# FETUSES US: NORMAL
AFP MOM SERPL: 0.72
AFP SERPL-MCNC: 29 NG/ML
AGE - REPORTED: 35.2 YR
CURRENT SMOKER: NO
FAMILY MEMBER DISEASES HX: NO
GA METHOD: NORMAL
GA: NORMAL WK
HCG MOM SERPL: 0.61
HCG SERPL-ACNC: NORMAL IU/L
HX OF HEREDITARY DISORDERS: NO
IDDM PATIENT QL: NO
INHIBIN A MOM SERPL: 0.69
INHIBIN A SERPL-MCNC: 102 PG/ML
INTEGRATED SCN PATIENT-IMP: NORMAL
PATHOLOGY STUDY: NORMAL
SPECIMEN DRAWN SERPL: NORMAL
U ESTRIOL MOM SERPL: 1.64
U ESTRIOL SERPL-MCNC: 2.27 NG/ML

## 2021-08-09 ENCOUNTER — MYC MEDICAL ADVICE (OUTPATIENT)
Dept: OBGYN | Facility: CLINIC | Age: 36
End: 2021-08-09

## 2021-08-09 NOTE — TELEPHONE ENCOUNTER
Pt currently 17w5d, last seen 8/5/2021. Pt asking for US order, RN notes per last visit level 2 US planned.    Katherine Santiago RN on 8/9/2021 at 10:27 AM

## 2021-09-15 ENCOUNTER — MYC MEDICAL ADVICE (OUTPATIENT)
Dept: OBGYN | Facility: CLINIC | Age: 36
End: 2021-09-15

## 2021-09-21 ENCOUNTER — PRENATAL OFFICE VISIT (OUTPATIENT)
Dept: OBGYN | Facility: CLINIC | Age: 36
End: 2021-09-21
Payer: COMMERCIAL

## 2021-09-21 VITALS — WEIGHT: 163 LBS | SYSTOLIC BLOOD PRESSURE: 122 MMHG | BODY MASS INDEX: 26.77 KG/M2 | DIASTOLIC BLOOD PRESSURE: 81 MMHG

## 2021-09-21 DIAGNOSIS — Z34.80 SUPERVISION OF OTHER NORMAL PREGNANCY, ANTEPARTUM: Primary | ICD-10-CM

## 2021-09-21 DIAGNOSIS — O34.219 PREVIOUS CESAREAN DELIVERY, ANTEPARTUM CONDITION OR COMPLICATION: ICD-10-CM

## 2021-09-21 PROBLEM — O20.8 SUBCHORIONIC HEMORRHAGE OF PLACENTA IN FIRST TRIMESTER: Status: RESOLVED | Noted: 2021-06-12 | Resolved: 2021-09-21

## 2021-09-21 LAB
GLUCOSE 1H P 50 G GLC PO SERPL-MCNC: 109 MG/DL (ref 70–129)
HGB BLD-MCNC: 11.6 G/DL (ref 11.7–15.7)

## 2021-09-21 PROCEDURE — 85018 HEMOGLOBIN: CPT | Performed by: OBSTETRICS & GYNECOLOGY

## 2021-09-21 PROCEDURE — 82952 GTT-ADDED SAMPLES: CPT | Performed by: OBSTETRICS & GYNECOLOGY

## 2021-09-21 PROCEDURE — 99207 PR PRENATAL VISIT: CPT | Performed by: OBSTETRICS & GYNECOLOGY

## 2021-09-21 PROCEDURE — 36415 COLL VENOUS BLD VENIPUNCTURE: CPT | Performed by: OBSTETRICS & GYNECOLOGY

## 2021-09-21 NOTE — PROGRESS NOTES
No significant signs, symptoms or concerns. CUCA  recently. Desires to do glucose screening one day early today.    Counseling included the following: Advice appropriate to gestational age reviewed including pertinent Checklist items. Discussed condition, tests and care plan including risks, benefits, and alternatives. Problem list updated.   20 minutes spent on the date of encounter doing chart review, history, examination, discussion with patient, and documentation, and further activities as noted, and review of appropriateness of decision-making for care, and review of outside records.   A/P:  Ely was seen today for prenatal care.    Diagnoses and all orders for this visit:    Supervision of other normal pregnancy, antepartum  -     Hemoglobin; Future  -     Glucose tolerance gest screen 1 hour; Future        Kelvin Waddell MD

## 2021-09-21 NOTE — PATIENT INSTRUCTIONS
If you have any questions regarding your visit, Please contact your care team.     [facility]: [phone]  Women s Health CLINIC HOURS TELEPHONE NUMBER       Kelvin Waddell M.D.    Katherine-MARIAN Natarajan-MARIAN Wlils-Medical Assistant    Ely-  Jayla-     Monday-[place]  8:00a.m-4:45 p.m  Tuesday-[place]  9:00a.m-4:00 p.m  Wednesday-[place] 8:00a.m-4:45 p.m.  Thursday-[place]  8:00a.m-4:45 p.m.  Friday-[place]  8:00a.m-4:45 p.m. [facility]  [address] [facility] [address] [place] [address] [phone] ask for [facility]  [phone] Fax  Imaging [phone]    [facility] Labor and Delivery  [address]  [place], [place] [address] [phone]    [facility]  [address]  [place] [address] [phone] ask [facility]  [phone] Fax  Imaging [phone]     Urgent Care locations:    [place]        [place] Monday-Friday  10 am - 8 pm  Saturday and Sunday   9 am - 5 pm  Monday-Friday   10 am - 8 pm  Saturday and Sunday   9 am - 5 pm   [phone]   If you need a medication refill, please contact your pharmacy. Please allow 3 business days for your refill to be completed.  As always, Thank you for trusting us with your healthcare needs!

## 2021-09-26 ENCOUNTER — HEALTH MAINTENANCE LETTER (OUTPATIENT)
Age: 36
End: 2021-09-26

## 2021-10-18 ENCOUNTER — PRENATAL OFFICE VISIT (OUTPATIENT)
Dept: OBGYN | Facility: CLINIC | Age: 36
End: 2021-10-18
Payer: COMMERCIAL

## 2021-10-18 VITALS
SYSTOLIC BLOOD PRESSURE: 111 MMHG | WEIGHT: 169.5 LBS | BODY MASS INDEX: 27.83 KG/M2 | HEART RATE: 82 BPM | DIASTOLIC BLOOD PRESSURE: 73 MMHG

## 2021-10-18 DIAGNOSIS — Z23 NEED FOR PROPHYLACTIC VACCINATION AND INOCULATION AGAINST INFLUENZA: ICD-10-CM

## 2021-10-18 DIAGNOSIS — O34.219 PREVIOUS CESAREAN DELIVERY, ANTEPARTUM CONDITION OR COMPLICATION: ICD-10-CM

## 2021-10-18 DIAGNOSIS — Z34.80 SUPERVISION OF OTHER NORMAL PREGNANCY, ANTEPARTUM: Primary | ICD-10-CM

## 2021-10-18 DIAGNOSIS — Z23 NEED FOR TDAP VACCINATION: ICD-10-CM

## 2021-10-18 PROCEDURE — 90472 IMMUNIZATION ADMIN EACH ADD: CPT | Performed by: OBSTETRICS & GYNECOLOGY

## 2021-10-18 PROCEDURE — 90686 IIV4 VACC NO PRSV 0.5 ML IM: CPT | Performed by: OBSTETRICS & GYNECOLOGY

## 2021-10-18 PROCEDURE — 90715 TDAP VACCINE 7 YRS/> IM: CPT | Performed by: OBSTETRICS & GYNECOLOGY

## 2021-10-18 PROCEDURE — 90471 IMMUNIZATION ADMIN: CPT | Performed by: OBSTETRICS & GYNECOLOGY

## 2021-10-18 PROCEDURE — 99207 PR PRENATAL VISIT: CPT | Performed by: OBSTETRICS & GYNECOLOGY

## 2021-10-18 NOTE — NURSING NOTE
Prior to immunization administration, verified patients identity using patient s name and date of birth. Please see Immunization Activity for additional information.     Screening Questionnaire for Adult Immunization    Are you sick today?   No   Do you have allergies to medications, food, a vaccine component or latex?   Yes   Have you ever had a serious reaction after receiving a vaccination?   No   Do you have a long-term health problem with heart, lung, kidney, or metabolic disease (e.g., diabetes), asthma, a blood disorder, no spleen, complement component deficiency, a cochlear implant, or a spinal fluid leak?  Are you on long-term aspirin therapy?   No   Do you have cancer, leukemia, HIV/AIDS, or any other immune system problem?   No   Do you have a parent, brother, or sister with an immune system problem?   No   In the past 3 months, have you taken medications that affect  your immune system, such as prednisone, other steroids, or anticancer drugs; drugs for the treatment of rheumatoid arthritis, Crohn s disease, or psoriasis; or have you had radiation treatments?   No   Have you had a seizure, or a brain or other nervous system problem?   No   During the past year, have you received a transfusion of blood or blood    products, or been given immune (gamma) globulin or antiviral drug?   No   For women: Are you pregnant or is there a chance you could become       pregnant during the next month?   No   Have you received any vaccinations in the past 4 weeks?   No     Immunization questionnaire was positive for at least one answer.  Notified Dr. Khanna.        Per orders of Dr. Khanna, injection of Tdap and Flu given by Lynne Ross MA. Patient instructed to remain in clinic for 15 minutes afterwards, and to report any adverse reaction to me immediately.       Screening performed by Lynne Ross MA on 10/18/2021 at 12:01 PM.

## 2021-10-18 NOTE — TELEPHONE ENCOUNTER
Pt currently 27w5d, last seen today.    Pt has next appt scheduled on 2021 with Dr. Waddell. Pt is asking about  scheduling and if 2022 is a date that would work. Pt asking as her mom needs to request off work now.    RN routing to provider for advisement on  scheduling.    Katherine Santiago RN on 10/18/2021 at 11:18 AM

## 2021-10-18 NOTE — PROGRESS NOTES
Presents for routine  appointment.     No complaints.    No abnormal discharge, leaking fluid, contractions, vaginal bleeding   ROS:   and GI negative.     /73 (BP Location: Right arm, Patient Position: Sitting, Cuff Size: Adult Regular)   Pulse 82   Wt 76.9 kg (169 lb 8 oz)   LMP 2021 (Exact Date)   BMI 27.83 kg/m          A/P:  35 year old  at 27w5d     Pregnancy c/b:  -Hx ePLTCS for hx 4th degree, planning RCS at 39wks with Dr. Waddell    Routine Prenatal Care:  -Rh Positive. Rhogam- NA  -TDAP and Flu today  -Discussed signs and symptoms of  labor  -Covid vaccine- s/p 2021    Follow up in 2 weeks.    Sarah Khanna DO

## 2021-10-19 ENCOUNTER — TELEPHONE (OUTPATIENT)
Dept: OBGYN | Facility: CLINIC | Age: 36
End: 2021-10-19

## 2021-10-19 NOTE — TELEPHONE ENCOUNTER
Kelvin Waddell MD Buss, James George, MD       Associated Diagnoses    Supervision of other normal pregnancy, antepartum  - Primary       Previous  delivery, antepartum condition or complication         Comments    Please schedule surgery as noted. Block out clinic time in [facility] as needed.   NPO for solids 8 hours prior to procedure time and NPO for clear liquids 2 hours prior to procedure time. Shower the night before or the morning of surgery.   Tier 2.            Order Questions    Question Answer Comment   Procedure name(s) - multi select  Delivery    Reason for procedure repeat    Surgeon: Bairon    Is this a multi surgeon case? No    Laterality N/A    Request for additional equipment Other (see comments) None   Anesthesia Spinal    Positioning (if different from preference card): Supine    Is the patient known to have any of the following? None of the above    Initiate Pre-op orders for above procedure: Yes, as ordered in Epic additional orders noted there also   Location of Case: [facility]    Operating room  requested: Yes    Urgency of Surgery: Routine    Surgeon Procedure Time (incision to closure) in minutes (per procedure as applicable) 60    Note:  Surgical Case Time Needed (in minutes)   Surgery Date: 39-40 weeks    Patient Class (for admit prior to surgery, specify number of days in comments): Surgery admit admit day of surgery   Length of Stay: 3 days    H&P To Be Completed By: Surgeon at future OB visit   [facility]    Post-Op Appointment 6 weeks    Vendor Needed? No    SURGERY SCHEDULING AND PRECERTIFICATION    Medical Record Number: [id number]  Ely Lott  YOB: 1985   Phone: [phone] (home)   Primary Provider: Jennifer Oliveira    Reason for Admit: Previous C/S  ICD-10 CODE:  034.219    Surgeon: Kelvin Waddell MD  Surgical Procedure: Repeat C/S    Date of Surgery 2022 Time of Surgery 7:30 am    Surgery to be performed at:  [facility]  Status: Inpatient- Length of stay:  3 days.  Type of Anesthesia Anticipated: Local with MAC    Sterilization consent:  Not applicable to procedure being performed.    Pre-Op: On 12/31/2021 with Dr. Waddell at [place] at 10 am   COVID testing:  The Covid test has been scheduled for 12/31/2021 at [facility]  at 10:40 am .  Post-Op:  6 weeks    Pre-certification routed to Financial Counselors:  Yes    Surgery packet mailed to patient's home address: Yes  Patient instructed NPO 12 hours prior to surgery, arrive 1.5 hour(s) prior to surgery, must have a .  Patient understood and agrees to the plan.      Requestor:  Ely Piper     Location:  [place] [phone]

## 2021-10-19 NOTE — TELEPHONE ENCOUNTER
PB DOS: 2022  Type of Procedure:  Delivery  CPT Codes: 69900  ICD10 Codes: 034.219  Surgeon/Ordering provider: Bairon  Pre-cert/Authorization completed:  No Pa required  Payer: [employer] [place]  Date Checked: 10/19/21  Spoke to [employer]  Ref. # Transaction ID: [id number]/ Auth # NA  Valid Dates:     Faxed surgery form and last OV note to [facility]

## 2021-11-08 ENCOUNTER — PRENATAL OFFICE VISIT (OUTPATIENT)
Dept: OBGYN | Facility: CLINIC | Age: 36
End: 2021-11-08
Payer: COMMERCIAL

## 2021-11-08 VITALS
WEIGHT: 173 LBS | SYSTOLIC BLOOD PRESSURE: 123 MMHG | OXYGEN SATURATION: 100 % | BODY MASS INDEX: 28.41 KG/M2 | DIASTOLIC BLOOD PRESSURE: 84 MMHG | HEART RATE: 84 BPM

## 2021-11-08 DIAGNOSIS — Z34.80 SUPERVISION OF OTHER NORMAL PREGNANCY, ANTEPARTUM: ICD-10-CM

## 2021-11-08 DIAGNOSIS — O34.219 PREVIOUS CESAREAN DELIVERY, ANTEPARTUM CONDITION OR COMPLICATION: ICD-10-CM

## 2021-11-08 PROCEDURE — 99207 PR PRENATAL VISIT: CPT | Performed by: OBSTETRICS & GYNECOLOGY

## 2021-11-08 NOTE — PROGRESS NOTES
No significant signs, symptoms or concerns except low backache.    Counseling included the following: Advice appropriate to gestational age reviewed including pertinent Checklist items. Discussed condition, tests and care plan including risks, benefits, and alternatives. Problem list updated.   20 minutes spent on the date of encounter doing chart review, history, examination, discussion with patient, and documentation, and further activities as noted, and review of appropriateness of decision-making for care, and review of test results, and interpretation of test results.  A/P:  Ely was seen today for prenatal care.    Diagnoses and all orders for this visit:    Supervision of other normal pregnancy, antepartum    Previous  delivery, antepartum condition or complication        Kelvin Waddell MD

## 2021-11-08 NOTE — PATIENT INSTRUCTIONS
If you have any questions regarding your visit, Please contact your care team.     [facility] Services: [phone]  Women s Health CLINIC HOURS TELEPHONE NUMBER       Kelvin Waddell M.D.    Katherine-MARIAN Natarajan-MARIAN Wills-Medical Assistant    Ely-  Jayla-     Monday-[place]  8:00a.m-4:45 p.m  Tuesday-[place]  9:00a.m-4:00 p.m  Wednesday-[place] 8:00a.m-4:45 p.m.  Thursday-[place]  8:00a.m-4:45 p.m.  Friday-[place]  8:00a.m-4:45 p.m. [facility]  [address] [facility] [address] [place] [address] [phone] ask for [facility]  [phone] Fax  Imaging [phone]    [facility] Labor and Delivery  [address]  [place], [place] [address] [phone]    [facility]  [address]  [place] [address] [phone] ask [facility]  [phone] Fax  Imaging [phone]     Urgent Care locations:    [place]        [place] Monday-Friday  10 am - 8 pm  Saturday and Sunday   9 am - 5 pm  Monday-Friday   10 am - 8 pm  Saturday and Sunday   9 am - 5 pm   [phone]   If you need a medication refill, please contact your pharmacy. Please allow 3 business days for your refill to be completed.  As always, Thank you for trusting us with your healthcare needs!

## 2021-11-24 ENCOUNTER — PRENATAL OFFICE VISIT (OUTPATIENT)
Dept: OBGYN | Facility: CLINIC | Age: 36
End: 2021-11-24
Payer: COMMERCIAL

## 2021-11-24 VITALS
WEIGHT: 173 LBS | HEART RATE: 103 BPM | SYSTOLIC BLOOD PRESSURE: 123 MMHG | OXYGEN SATURATION: 100 % | DIASTOLIC BLOOD PRESSURE: 77 MMHG | BODY MASS INDEX: 28.41 KG/M2

## 2021-11-24 DIAGNOSIS — Z34.80 SUPERVISION OF OTHER NORMAL PREGNANCY, ANTEPARTUM: ICD-10-CM

## 2021-11-24 DIAGNOSIS — O34.219 PREVIOUS CESAREAN DELIVERY, ANTEPARTUM CONDITION OR COMPLICATION: ICD-10-CM

## 2021-11-24 PROCEDURE — 99207 PR PRENATAL VISIT: CPT | Performed by: OBSTETRICS & GYNECOLOGY

## 2021-11-24 NOTE — PATIENT INSTRUCTIONS
If you have any questions regarding your visit, Please contact your care team.     [facility] Services: [phone]  Women s Health CLINIC HOURS TELEPHONE NUMBER       Kelvin Waddell M.D.    Katherine-MARIAN Natarajan-MARIAN Wills-Medical Assistant    Ely-  Jayla-     Monday-[place]  8:00a.m-4:45 p.m  Tuesday-[place]  9:00a.m-4:00 p.m  Wednesday-[place] 8:00a.m-4:45 p.m.  Thursday-[place]  8:00a.m-4:45 p.m.  Friday-[place]  8:00a.m-4:45 p.m. [facility]  [address] [facility] [address] [place] [address] [phone] ask for [facility]  [phone] Fax  Imaging [phone]    [facility] Labor and Delivery  [address]  [place], [place] [address] [phone]    [facility]  [address]  [place] [address] [phone] ask [facility]  [phone] Fax  Imaging [phone]     Urgent Care locations:    [place]        [place] Monday-Friday  10 am - 8 pm  Saturday and Sunday   9 am - 5 pm  Monday-Friday   10 am - 8 pm  Saturday and Sunday   9 am - 5 pm   [phone]   If you need a medication refill, please contact your pharmacy. Please allow 3 business days for your refill to be completed.  As always, Thank you for trusting us with your healthcare needs!

## 2021-11-25 NOTE — PROGRESS NOTES
No significant signs, symptoms or concerns except back pain and has chiropractic treatment. Some insomnia.    Counseling included the following: Advice appropriate to gestational age reviewed including pertinent Checklist items. Discussed condition, tests and care plan including risks, benefits, and alternatives. Problem list updated.   20 minutes spent on the date of encounter doing chart review, history, examination, discussion with patient, and documentation, and further activities as noted, and review of appropriateness of decision-making for care.  A/P:  Ely was seen today for prenatal care.    Diagnoses and all orders for this visit:    Supervision of other normal pregnancy, antepartum    Previous  delivery, antepartum condition or complication        Kelvin Waddell MD

## 2021-12-08 ENCOUNTER — PRENATAL OFFICE VISIT (OUTPATIENT)
Dept: OBGYN | Facility: CLINIC | Age: 36
End: 2021-12-08
Payer: COMMERCIAL

## 2021-12-08 VITALS
HEART RATE: 87 BPM | BODY MASS INDEX: 29.07 KG/M2 | WEIGHT: 177 LBS | SYSTOLIC BLOOD PRESSURE: 120 MMHG | OXYGEN SATURATION: 100 % | DIASTOLIC BLOOD PRESSURE: 81 MMHG

## 2021-12-08 DIAGNOSIS — O34.219 PREVIOUS CESAREAN DELIVERY, ANTEPARTUM CONDITION OR COMPLICATION: ICD-10-CM

## 2021-12-08 DIAGNOSIS — Z34.80 SUPERVISION OF OTHER NORMAL PREGNANCY, ANTEPARTUM: ICD-10-CM

## 2021-12-08 PROCEDURE — 99207 PR PRENATAL VISIT: CPT | Performed by: OBSTETRICS & GYNECOLOGY

## 2021-12-08 NOTE — PROGRESS NOTES
No significant signs, symptoms or concerns except sometimes craves ice. Hgb was normal previously. Taking PNVits.   Counseling included the following: Advice appropriate to gestational age reviewed including pertinent Checklist items. Discussed condition, tests and care plan including risks, benefits, and alternatives. Problem list updated.   20 minutes spent on the date of encounter doing chart review, history, examination, discussion with patient, and documentation, and further activities as noted, and review of appropriateness of decision-making for care.  GBS next.  A/P:  Ely was seen today for prenatal care.    Diagnoses and all orders for this visit:    Supervision of other normal pregnancy, antepartum    Previous  delivery, antepartum condition or complication        Kelvin Waddell MD

## 2021-12-08 NOTE — PATIENT INSTRUCTIONS
If you have any questions regarding your visit, Please contact your care team.     [employer] [facility] Services: [phone]  Women s Health CLINIC HOURS TELEPHONE NUMBER       Kelvin Waddell M.D.    Katherine-MARIAN Natarajan-MARIAN Wills-Medical Assistant    Ely-  Jayla-     Monday-[place]  8:00a.m-4:45 p.m  Tuesday-[place]  9:00a.m-4:00 p.m  Wednesday-[place] 8:00a.m-4:45 p.m.  Thursday-[place]  8:00a.m-4:45 p.m.  Friday-[place]  8:00a.m-4:45 p.m. [facility]  [address] [facility] [address] [place] [address] [phone] ask for [facility]  [phone] Fax  Imaging [phone]    [facility] Labor and Delivery  [address]  [place], [place] [address] [phone]    [facility]  [address]  [place] [address] [phone] ask [facility]  [phone] Fax  Imaging [phone]     Urgent Care locations:    [place]        [place] Monday-Friday  10 am - 8 pm  Saturday and Sunday   9 am - 5 pm  Monday-Friday   10 am - 8 pm  Saturday and Sunday   9 am - 5 pm   [phone]   If you need a medication refill, please contact your pharmacy. Please allow 3 business days for your refill to be completed.  As always, Thank you for trusting us with your healthcare needs!

## 2021-12-17 ENCOUNTER — PRENATAL OFFICE VISIT (OUTPATIENT)
Dept: OBGYN | Facility: CLINIC | Age: 36
End: 2021-12-17
Payer: COMMERCIAL

## 2021-12-17 VITALS
SYSTOLIC BLOOD PRESSURE: 120 MMHG | OXYGEN SATURATION: 99 % | WEIGHT: 178 LBS | DIASTOLIC BLOOD PRESSURE: 82 MMHG | HEART RATE: 85 BPM | BODY MASS INDEX: 29.23 KG/M2

## 2021-12-17 DIAGNOSIS — Z34.80 SUPERVISION OF OTHER NORMAL PREGNANCY, ANTEPARTUM: ICD-10-CM

## 2021-12-17 DIAGNOSIS — O34.219 PREVIOUS CESAREAN DELIVERY, ANTEPARTUM CONDITION OR COMPLICATION: ICD-10-CM

## 2021-12-17 PROCEDURE — 87653 STREP B DNA AMP PROBE: CPT | Performed by: OBSTETRICS & GYNECOLOGY

## 2021-12-17 PROCEDURE — 99207 PR PRENATAL VISIT: CPT | Performed by: OBSTETRICS & GYNECOLOGY

## 2021-12-17 NOTE — PROGRESS NOTES
No significant signs, symptoms or concerns except pelvic pressure.    Counseling included the following: Advice appropriate to gestational age reviewed including pertinent Checklist items. Discussed condition, tests and care plan including risks, benefits, and alternatives. Problem list updated.   20 minutes spent on the date of encounter doing chart review, history, examination, discussion with patient, and documentation, and further activities as noted, and review of appropriateness of decision-making for care.  Preop exam next.  A/P:  Ely was seen today for prenatal care.    Diagnoses and all orders for this visit:    Supervision of other normal pregnancy, antepartum  -     Group B strep PCR    Previous  delivery, antepartum condition or complication        Kelvin Waddell MD

## 2021-12-17 NOTE — PATIENT INSTRUCTIONS
If you have any questions regarding your visit, Please contact your care team.     [employer] Services: [phone]  Women s Health CLINIC HOURS TELEPHONE NUMBER       Kelvin Waddell M.D.    Katherine-MARIAN Natarajan-MARIAN Wills-Medical Assistant    Ely-  Jayla-     Monday-[place]  8:00a.m-4:45 p.m  Tuesday-[place]  9:00a.m-4:00 p.m  Wednesday-[place] 8:00a.m-4:45 p.m.  Thursday-[place]  8:00a.m-4:45 p.m.  Friday-[place]  8:00a.m-4:45 p.m. [facility]  [address] [facility] [address] [facility] [address] [place] [address] [phone] ask for [facility]  [phone] Fax  Imaging [phone]    [facility] Labor and Delivery  [address]  [place], [place] [address] [phone]    [facility]  [address]  [place] [address] [phone] ask [facility]  [phone] Fax  Imaging [phone]     Urgent Care locations:    [place]        [place] Monday-Friday  10 am - 8 pm  Saturday and Sunday   9 am - 5 pm  Monday-Friday   10 am - 8 pm  Saturday and Sunday   9 am - 5 pm   [phone]   If you need a medication refill, please contact your pharmacy. Please allow 3 business days for your refill to be completed.  As always, Thank you for trusting us with your healthcare needs!

## 2021-12-18 LAB
GP B STREP DNA SPEC QL NAA+PROBE: NEGATIVE
PATIENT PENICILLIN, AMOXICILLIN, CEPHALOSPORINS ALLERGY: YES

## 2021-12-21 ENCOUNTER — MYC MEDICAL ADVICE (OUTPATIENT)
Dept: OBGYN | Facility: CLINIC | Age: 36
End: 2021-12-21
Payer: COMMERCIAL

## 2021-12-21 NOTE — TELEPHONE ENCOUNTER
36w 6d. Last seen on 21 for prenatal care. Next prenatal visit 21.    Repeat c/s scheduled on 21    RN called pt back in response to her MyChart w/concerns of contractions.    Last night contractions started around 2:00-3:00am thought unsure how often they were as was in and out of sleep.  This am contractions are every half an hour.    Pt states cervix was 1.5cm/70 at last appt.     Fetus is active, denies vaginal bleeding or srom. Does have vaginal mucous last couple of days.    Gave labor precautions and when to be seen in hospital. Asked pt to call clinic w/contractions of q/5min for an, hour, srom so RN can contact oncall provider and ensure hospital is open for pts.    Encouraged to drink 80-100oz water daily, keep bladder and bowels empty as able and to continue to monitor for labor symptoms.    Pt verbalized understanding and agreement and questions were answered.    JENISE LimN RN

## 2021-12-22 ENCOUNTER — PRENATAL OFFICE VISIT (OUTPATIENT)
Dept: OBGYN | Facility: CLINIC | Age: 36
End: 2021-12-22
Payer: COMMERCIAL

## 2021-12-22 VITALS
HEART RATE: 81 BPM | DIASTOLIC BLOOD PRESSURE: 82 MMHG | OXYGEN SATURATION: 97 % | BODY MASS INDEX: 29.66 KG/M2 | WEIGHT: 178 LBS | SYSTOLIC BLOOD PRESSURE: 130 MMHG | HEIGHT: 65 IN

## 2021-12-22 DIAGNOSIS — Z34.80 SUPERVISION OF OTHER NORMAL PREGNANCY, ANTEPARTUM: ICD-10-CM

## 2021-12-22 DIAGNOSIS — O34.219 PREVIOUS CESAREAN DELIVERY, ANTEPARTUM CONDITION OR COMPLICATION: ICD-10-CM

## 2021-12-22 PROCEDURE — 99207 PR PRENATAL VISIT: CPT | Performed by: OBSTETRICS & GYNECOLOGY

## 2021-12-22 ASSESSMENT — MIFFLIN-ST. JEOR: SCORE: 1510.1

## 2021-12-22 NOTE — PROGRESS NOTES
No significant signs, symptoms or concerns except more BH contractions.    Counseling included the following: Advice appropriate to gestational age reviewed including pertinent Checklist items. Discussed condition, tests and care plan including risks, benefits, and alternatives. Problem list updated.   20 minutes spent on the date of encounter doing chart review, history, examination, discussion with patient, and documentation, and further activities as noted, and review of appropriateness of decision-making for care, and review of test results, and interpretation of test results.  Preop exam next.  A/P:  Ely was seen today for prenatal care and pre-op exam.    Diagnoses and all orders for this visit:    Supervision of other normal pregnancy, antepartum    Previous  delivery, antepartum condition or complication        Kelvin Waddell MD

## 2021-12-22 NOTE — PATIENT INSTRUCTIONS
If you have any questions regarding your visit, Please contact your care team.     [facility]: [phone]  Women s Health CLINIC HOURS TELEPHONE NUMBER       Kelvin Waddell M.D.    Katherine-MARIAN Natarajan-MARIAN Wills-Medical Assistant    Ely-  Jayla-     Monday-[place]  8:00a.m-4:45 p.m  Tuesday-[place]  9:00a.m-4:00 p.m  Wednesday-[place] 8:00a.m-4:45 p.m.  Thursday-[place]  8:00a.m-4:45 p.m.  Friday-[place]  8:00a.m-4:45 p.m. [facility]  [address] [facility] [address] [id number] [phone] ask for [facility]  [phone] Fax  Imaging [phone]    [facility] Delivery  [address]  [place], [place] [address] [phone]    [facility]  [address]  [place] [address] [phone] ask [facility]  [phone] Fax  Imaging [phone]     Urgent Care locations:    [place]        [place] Monday-Friday  10 am - 8 pm  Saturday and Sunday   9 am - 5 pm  Monday-Friday   10 am - 8 pm  Saturday and Sunday   9 am - 5 pm   [phone]   If you need a medication refill, please contact your pharmacy. Please allow 3 business days for your refill to be completed.  As always, Thank you for trusting us with your healthcare needs!

## 2021-12-28 ENCOUNTER — NURSE TRIAGE (OUTPATIENT)
Dept: NURSING | Facility: CLINIC | Age: 36
End: 2021-12-28
Payer: COMMERCIAL

## 2021-12-29 NOTE — TELEPHONE ENCOUNTER
"Patient states she is having contractions.  Has been having contractions every 3-4 min for the last hour.  Patient is also feeling pelvic pressure.  Patient is feeling baby move between contractions.  Patient denies bleeding or loss of fluid.    Rosana Rose RN   21 6:17 PM  [facility] Nurse Advisor      Reason for Disposition    [1] History of prior delivery (multipara) AND [2] contractions < 10 minutes apart AND [3] present 1 hour    Additional Information    Negative: Passed out (i.e., lost consciousness, collapsed and was not responding)    Negative: Shock suspected (e.g., cold/pale/clammy skin, too weak to stand, low BP, rapid pulse)    Negative: Difficult to awaken or acting confused (e.g., disoriented, slurred speech)    Negative: [1] SEVERE abdominal pain (e.g., excruciating) AND [2] constant AND [3] present > 1 hour    Negative: Severe bleeding (e.g., continuous red blood from vagina, or large blood clots)    Negative: Umbilical cord hanging out of the vagina (shiny, white, curled appearance, \"like telephone cord\")    Negative: Uncontrollable urge to push (i.e., feels like baby is coming out now)    Negative: Can see baby    Negative: Sounds like a life-threatening emergency to the triager    Negative: Pregnant < 37 weeks (i.e., )    Negative: [1] Uncertain delivery date AND [2] possibly pregnant < 37 weeks (i.e., )    Negative: [1] First baby (primipara) AND [2] contractions < 6 minutes apart  AND [3] present 2 hours    Protocols used: PREGNANCY - LABOR-A-    OB Triage Call      Is patient's OB/Midwife with the [facility]? [facility]- Is patient's primary OB a Midwife? No- Proceed with triage.     Reason for call: Contractions and pressure    Assessment: Contractions every 4-5 hours for the last hour    Plan: Deliver via C/S due to 4th degree tear with last baby    Patient's primary OB Provider is [facility].    Per protocol recommendations Patient to be evaluated in " L&D.  Patient plans to deliver at [facility] ([phone]).  Labor and Deliver notified of patient's pending arrival.  Report given to Angela.    Is patient's delivering hospital on divert? No      37w6d    Estimated Date of Delivery: 2022        OB History    Para Term  AB Living   4 2 2 0 1 2   SAB IAB Ectopic Multiple Live Births   1 0 0 0 2      # Outcome Date GA Lbr Ammon/2nd Weight Sex Delivery Anes PTL Lv   4 Current            3 SAB 21 6w2d    SAB         Birth Comments: no D&C   2 Term 19 39w1d  3.544 kg (7 lb 13 oz) M CS-LTranv Spinal  ARSALAN      Name: Mic Gardner Term 17 39w4d 28:00 / 03:00 3.742 kg (8 lb 4 oz) M  EPI N ARSALAN      Complications: Shoulder Dystocia, Fourth degree perineal laceration      Name: Angel      Apgar1: 8  Apgar5: 9       Lab Results   Component Value Date    GBS Negative 2019          Rosana Rose, RN 21 6:07 PM  [facility] Nurse Advisor

## 2021-12-31 ENCOUNTER — LAB (OUTPATIENT)
Dept: URGENT CARE | Facility: URGENT CARE | Age: 36
End: 2021-12-31
Payer: COMMERCIAL

## 2021-12-31 ENCOUNTER — PRENATAL OFFICE VISIT (OUTPATIENT)
Dept: OBGYN | Facility: CLINIC | Age: 36
End: 2021-12-31
Payer: COMMERCIAL

## 2021-12-31 VITALS
HEIGHT: 65 IN | OXYGEN SATURATION: 98 % | HEART RATE: 91 BPM | WEIGHT: 179 LBS | SYSTOLIC BLOOD PRESSURE: 123 MMHG | BODY MASS INDEX: 29.82 KG/M2 | DIASTOLIC BLOOD PRESSURE: 81 MMHG

## 2021-12-31 DIAGNOSIS — Z34.80 SUPERVISION OF OTHER NORMAL PREGNANCY, ANTEPARTUM: ICD-10-CM

## 2021-12-31 DIAGNOSIS — O34.219 PREVIOUS CESAREAN DELIVERY, ANTEPARTUM CONDITION OR COMPLICATION: ICD-10-CM

## 2021-12-31 LAB — SARS-COV-2 RNA RESP QL NAA+PROBE: NEGATIVE

## 2021-12-31 PROCEDURE — 99207 PR PRENATAL VISIT: CPT | Performed by: OBSTETRICS & GYNECOLOGY

## 2021-12-31 PROCEDURE — U0005 INFEC AGEN DETEC AMPLI PROBE: HCPCS

## 2021-12-31 PROCEDURE — U0003 INFECTIOUS AGENT DETECTION BY NUCLEIC ACID (DNA OR RNA); SEVERE ACUTE RESPIRATORY SYNDROME CORONAVIRUS 2 (SARS-COV-2) (CORONAVIRUS DISEASE [COVID-19]), AMPLIFIED PROBE TECHNIQUE, MAKING USE OF HIGH THROUGHPUT TECHNOLOGIES AS DESCRIBED BY CMS-2020-01-R: HCPCS

## 2021-12-31 ASSESSMENT — MIFFLIN-ST. JEOR: SCORE: 1514.64

## 2021-12-31 NOTE — PROGRESS NOTES
No significant signs, symptoms or concerns except had interval Labor and Delivery evaluation for false labor. Had brief exposure to mother who had negative and then a positive COVID- 19 test. Patient asymptomatic now and having screening today.   The patient denies allergies, anesthesia problems, bleeding tendencies, corticosteroids, diabetes, thromboembolic phenonmenon, rheumatic fever, glaucoma, heart murmur, hepatitis, herbal supplements, recent illnesses, sleep apnea, implanted devices, body jewelry, Latex sensitivity, transfusions, and tobacco use except for the following: allergies as noted.   Exam: Head, Neck, Airway, Heart, and Lungs all normal/negative.    Preop instructions given. Satisfactory pre-anesthetic medical exam.    Counseling included the following: Advice appropriate to gestational age reviewed including pertinent Checklist items. Discussed condition, tests and care plan including risks, benefits, and alternatives. Problem list updated.   20 minutes spent on the date of encounter doing chart review, history, examination, discussion with patient, and documentation, and further activities as noted, and review of appropriateness of decision-making for care.  A/P:  Ely was seen today for prenatal care and pre-op exam.    Diagnoses and all orders for this visit:    Supervision of other normal pregnancy, antepartum    Previous  delivery, antepartum condition or complication        Kelvin Waddell MD

## 2021-12-31 NOTE — PATIENT INSTRUCTIONS
If you have any questions regarding your visit, Please contact your care team.     [facility]: [phone]  Women s Health CLINIC HOURS TELEPHONE NUMBER       Kelvin Waddell M.D.    Katherine-MARIAN Natarajan-MARIAN Wills-Medical Assistant    Ely-  Jayla-     Monday-[place]  8:00a.m-4:45 p.m  Tuesday-[place]  9:00a.m-4:00 p.m  Wednesday-[place] 8:00a.m-4:45 p.m.  Thursday-[place]  8:00a.m-4:45 p.m.  Friday-[place]  8:00a.m-4:45 p.m. [facility]  [address] [place] [address] [phone] ask for [facility]  [phone] Fax  Imaging [phone]    [facility] Labor and Delivery  [address]  [place], [place] [address] [phone]    [facility]  [address]  [place] [address] [phone] ask [facility]  [phone] Fax  Imaging [phone]     Urgent Care locations:    [place]        [place] Monday-Friday  10 am - 8 pm  Saturday and Sunday   9 am - 5 pm  Monday-Friday   10 am - 8 pm  Saturday and Sunday   9 am - 5 pm   [phone]   If you need a medication refill, please contact your pharmacy. Please allow 3 business days for your refill to be completed.  As always, Thank you for trusting us with your healthcare needs!

## 2022-01-12 ENCOUNTER — MYC MEDICAL ADVICE (OUTPATIENT)
Dept: OBGYN | Facility: CLINIC | Age: 37
End: 2022-01-12
Payer: COMMERCIAL

## 2022-01-16 ENCOUNTER — HEALTH MAINTENANCE LETTER (OUTPATIENT)
Age: 37
End: 2022-01-16

## 2022-01-17 ENCOUNTER — NURSE TRIAGE (OUTPATIENT)
Dept: NURSING | Facility: CLINIC | Age: 37
End: 2022-01-17
Payer: COMMERCIAL

## 2022-01-17 NOTE — TELEPHONE ENCOUNTER
Pt continued to have bleeding, increasing in heaviness, feeling lightheaded. Pt was advised to be seen in ED.    RN called ED and gave report.    Katherine Santiago RN on 1/17/2022 at 2:14 PM

## 2022-01-17 NOTE — TELEPHONE ENCOUNTER
Pt had  on 2022.    Pt denies bleeding for last 5 days until this AM, light bleeding started again. Pt denies passing any clots, also noticing increased vaginal mucus-like discharge over past few days. Pt having some abdominal pain, not significant. Pt denies vaginal itching or burning.    RN routing to provider on continued monitoring or other advisement.    RN relayed ED precautions.    Katherine Santiago RN on 2022 at 10:29 AM

## 2022-01-17 NOTE — TELEPHONE ENCOUNTER
"Repeat c/section on 1/5/22.    Pt called back in response to RN Finderlyhart message and phone voice message.    Pt states had not been bleeding until today around 10am, when blood started to \"pour out\" when on the toilet.  States the blood continues to pour out of her, and admits to feeling light headed.  States has had same pad on for 3 hours, but lost much blood while on toilet.     Was advised to be evaluated in ER.  is with pt and will drive pt to [facility] ER.    Ambar Vila, JENISEN RN    "

## 2022-01-17 NOTE — TELEPHONE ENCOUNTER
"Nurse Triage SBAR    Is this a 2nd Level Triage? YES, patient has already discussed with OBGYN who recommended ED if symptoms worsen    Situation delivered 2 weeks ago via , bleeding stopped 1 week ago, more bleeding today, constant stream of blood    Background feels like there is continuous bleeding when she sits down to go to the bathroom, doesn't think she has filled a pad but feels like there is a steady stream when she sits on the toilet. Incision is more sore on one side, otherwise looks ok, one side is a little puffy. Bleeding stopped 1 week. Thought she had to pee, but it was a gush of blood. Has had the same pad on since 10 am. Abdominal pain-moderate-constant, worse since she started bleeding.     Assessment bleeding sounds moderate-severe. Has had the same pad on without changing since 10am but feels like blood is \"constantly dripping out\" when out the toilet    Recommendation be seen in the ED for imaging and acute care    Protocol Recommended Disposition: Go to ED now          Catie Jefferson RN [facility] 2022 1:45 PM      COVID 19 Nurse Triage Plan/Patient Instructions    Please be aware that novel coronavirus (COVID-19) may be circulating in the community. If you develop symptoms such as fever, cough, or SOB or if you have concerns about the presence of another infection including coronavirus (COVID-19), please contact your health care provider or visit [email or web address].     Disposition/Instructions    ED Visit recommended. Follow protocol based instructions.     Bring Your Own Device:  Please also bring your smart device(s) (smart phones, tablets, laptops) and their charging cables for your personal use and to communicate with your care team during your visit.    Thank you for taking steps to prevent the spread of this virus.  o Limit your contact with others.  o Wear a simple mask to cover your cough.  o Wash your hands well and " often.    Resources    [facility]: About COVID-19: [email or web address]    CDC: What to Do If You're Sick: www.cdc.gov/coronavirus/2019-ncov/about/steps-when-sick.html    CDC: Ending Home Isolation: www.cdc.gov/coronavirus/2019-ncov/hcp/disposition-in-home-patients.html     CDC: Caring for Someone: www.cdc.gov/coronavirus/2019-ncov/if-you-are-sick/care-for-someone.html     [facility]: Interim Guidance for Hospital Discharge to Home: www.health.[facility].[place]/diseases/coronavirus/hcp/hospdischarge.pdf    [facility] clinical trials (COVID-19 research studies): [email or web address]-clinical-trials     Below are the COVID-19 hotlines at the [place] Department of Health ([facility]). Interpreters are available.   o For health questions: Call 941-313-1561 or 1-411.971.5651 (7 a.m. to 7 p.m.)  o For questions about schools and childcare: Call 505-020-7829 or 1-560.386.7388 (7 a.m. to 7 p.m.)                         Additional Information    Negative: Shock suspected (e.g., cold/pale/clammy skin, too weak to stand, low BP, rapid pulse)    Negative: Difficult to awaken or acting confused (e.g., disoriented, slurred speech)    Negative: Passed out (i.e., fainted, collapsed and was not responding)    Negative: Sounds like a life-threatening emergency to the triager    Negative: SEVERE dizziness (e.g., unable to stand, requires support to walk, feels like passing out now)    Negative: SEVERE abdominal pain and present > 1 hour    Negative: Fever > 100.4 F (38.0 C)    Negative: SEVERE vaginal bleeding (e.g., soaking 2 pads or tampons per hour) and present 2 or more hours    Protocols used: POSTPARTUM - VAGINAL BLEEDING AND LOCHIA-A-[place]

## 2022-01-17 NOTE — TELEPHONE ENCOUNTER
Agree with recommendations as noted. Suggest using Tylenol for some of the pain control instead of strictly ibuprofen. Fluids, rest, and symptomatic measures for diarrhea (bland diet, cottage cheese, etc) and monitor closely to make sure this is not worsening in the next few days (may suggest C diff colitis). Please notify.    Kelvin Waddell MD

## 2022-01-18 NOTE — TELEPHONE ENCOUNTER
Advise stopping the Lovenox now. Return to clinic for postop check to recheck incision and BP within the next week. Please notify.     Kelvin Waddell MD

## 2022-01-18 NOTE — TELEPHONE ENCOUNTER
Hx of  on 2022.    Pt seen in ED yesterday for concerns for hemorrhage, had episode of heavy bleeding that started around 10AM yesterday, stopped after returning home from ED.    Pt states BP was elevated in ED at 138/97, has had headaches. RN advised when to reach out for worsening symptoms (vision changes, abdominal pain, etc.). Pt is currently on lovenox.    RN routing to provider for advisement on recommendations for f/u.    Katherine Santiago RN on 2022 at 11:12 AM

## 2022-01-19 NOTE — TELEPHONE ENCOUNTER
"Pt is scheduled for a PP visit on 1/27.  She was advised to stop Lovenox due to increased vaginal bleeding.  Pt is wondering if she should take a baby Aspirin in the meantime in place of the Lovenox \"to help with the possible blood clots\".    Pt's bleeding just started up again.  Asking her how heavy it is.    Routing to Dr. Waddell to further advise on.    Rosa Isela Priest RN    "

## 2022-01-19 NOTE — TELEPHONE ENCOUNTER
I do not suggest aspirin be used. Continue with Tylenol, ibuprofen, or oxycodone for pain. Ensure adequate rest and fluids to help with headache and monitor BP. Please notify.     Kelvin Waddell MD

## 2022-01-27 ENCOUNTER — OFFICE VISIT (OUTPATIENT)
Dept: OBGYN | Facility: CLINIC | Age: 37
End: 2022-01-27
Payer: COMMERCIAL

## 2022-01-27 VITALS
DIASTOLIC BLOOD PRESSURE: 85 MMHG | SYSTOLIC BLOOD PRESSURE: 138 MMHG | BODY MASS INDEX: 26.44 KG/M2 | HEART RATE: 94 BPM | OXYGEN SATURATION: 100 % | WEIGHT: 161 LBS

## 2022-01-27 PROBLEM — O34.219 PREVIOUS CESAREAN DELIVERY, ANTEPARTUM CONDITION OR COMPLICATION: Status: RESOLVED | Noted: 2021-06-07 | Resolved: 2022-01-27

## 2022-01-27 PROBLEM — Z34.80 SUPERVISION OF OTHER NORMAL PREGNANCY, ANTEPARTUM: Status: RESOLVED | Noted: 2021-06-07 | Resolved: 2022-01-27

## 2022-01-27 PROBLEM — O09.529 AMA (ADVANCED MATERNAL AGE) MULTIGRAVIDA 35+: Status: RESOLVED | Noted: 2021-06-07 | Resolved: 2022-01-27

## 2022-01-27 PROCEDURE — 99207 PR POST PARTUM EXAM: CPT | Performed by: OBSTETRICS & GYNECOLOGY

## 2022-01-27 RX ORDER — FERROUS SULFATE 324(65)MG
325 TABLET, DELAYED RELEASE (ENTERIC COATED) ORAL
COMMUNITY
Start: 2022-01-07 | End: 2022-01-27

## 2022-01-27 ASSESSMENT — PATIENT HEALTH QUESTIONNAIRE - PHQ9
10. IF YOU CHECKED OFF ANY PROBLEMS, HOW DIFFICULT HAVE THESE PROBLEMS MADE IT FOR YOU TO DO YOUR WORK, TAKE CARE OF THINGS AT HOME, OR GET ALONG WITH OTHER PEOPLE: NOT DIFFICULT AT ALL
SUM OF ALL RESPONSES TO PHQ QUESTIONS 1-9: 3
SUM OF ALL RESPONSES TO PHQ QUESTIONS 1-9: 3

## 2022-01-27 NOTE — PATIENT INSTRUCTIONS
If you have any questions regarding your visit, Please contact your care team.     [employer] Services: [phone]  Women s Health CLINIC HOURS TELEPHONE NUMBER       Kelvin Waddell M.D.    Katherine-MARIAN Natarajan-MARIAN Wills-Medical Assistant    Ely-  Jayla-     Monday-[place]  8:00a.m-4:45 p.m  Tuesday-[place]  9:00a.m-4:00 p.m  Wednesday-[place] 8:00a.m-4:45 p.m.  Thursday-[place]  8:00a.m-4:45 p.m.  Friday-[place]  8:00a.m-4:45 p.m. [facility]  [address] [place] [address] [phone] ask for [facility]  [phone] Fax  Imaging [phone]    [facility] Delivery  [address] [place] [address] [place] [address]  [phone]    [facility]  [address]  [place] [address] [phone] ask [facility]  [phone] Fax  Imaging [phone]     Urgent Care locations:    [facility] Monday-Friday  10 am - 8 pm  Saturday and Sunday   9 am - 5 pm  Monday-Friday   10 am - 8 pm  Saturday and Sunday   9 am - 5 pm   [phone]   If you need a medication refill, please contact your pharmacy. Please allow 3 business days for your refill to be completed.  As always, Thank you for trusting us with your healthcare needs!

## 2022-01-27 NOTE — PROGRESS NOTES
Ely Lott is a 36 year old year old G 4 P 3 who is here today for a postpartum exam.    HPI:      Doing well and without signif sx or concerns except had interval ED evaluation for postpartum bleeding- reviewed and assessment neg. Has stopped Lovenox and aspirin now. Lochia flow is normal now. Home BP normal. Currently breast feeding infant. Here today with infant daughter. Infant doing well. Contraceptive method planned is vas. NSA since delivery. PP depression screening as noted. See PHQ-9. Score = 3.    Past medical, obstetrical, surgical, family and social history reviewed and as noted or updated in chart.     Allergies, meds and supplements are as noted or updated in chart.      ROS:     Systems reviewed include constitutional; breast;                 cardiac; respiratory; gastrointestinal; genitourinary;                                musculoskeletal; integumentary; psychological;                                hematologic/lymphatic and endocrine.                  These systems were negative for significant symptoms except                 for the following: none and see HPI.                                Exam:  VS as noted.                    Abd is                             normal or negative except for, or in particular noting, the following                pertinent findings: none. Wd A.      Assessment: Postpartum exam    Plan and Recommendations: Counseling included the following: Symptoms, problems and concerns reviewed. Discussed pregnancy, birth, future pregnancy plans, work plans and infant care issues.  Problem list updated and Pregnancy Episode closed. See orders. Return to clinic in 12 months.  30 minutes spent on the date of encounter doing chart review, history, examination, discussion with patient, and documentation, and further activities as noted, and review of appropriateness of decision-making for care, and review of outside records.    Ely was seen today for post partum exam and  surgical followup.    Diagnoses and all orders for this visit:    Routine postpartum follow-up        Kelvin Waddell MD

## 2022-01-28 ENCOUNTER — MYC MEDICAL ADVICE (OUTPATIENT)
Dept: OBGYN | Facility: CLINIC | Age: 37
End: 2022-01-28
Payer: COMMERCIAL

## 2022-01-28 ASSESSMENT — PATIENT HEALTH QUESTIONNAIRE - PHQ9: SUM OF ALL RESPONSES TO PHQ QUESTIONS 1-9: 3

## 2022-05-24 ENCOUNTER — OFFICE VISIT (OUTPATIENT)
Dept: URGENT CARE | Facility: URGENT CARE | Age: 37
End: 2022-05-24
Payer: COMMERCIAL

## 2022-05-24 DIAGNOSIS — B34.9 VIRAL SYNDROME: Primary | ICD-10-CM

## 2022-05-24 DIAGNOSIS — R50.9 FEVER IN ADULT: ICD-10-CM

## 2022-05-24 DIAGNOSIS — Z11.52 ENCOUNTER FOR SCREENING FOR COVID-19: ICD-10-CM

## 2022-05-24 LAB
DEPRECATED S PYO AG THROAT QL EIA: NEGATIVE
FLUAV AG SPEC QL IA: NEGATIVE
FLUBV AG SPEC QL IA: NEGATIVE
GROUP A STREP BY PCR: NOT DETECTED
SARS-COV-2 RNA RESP QL NAA+PROBE: NEGATIVE

## 2022-05-24 PROCEDURE — U0003 INFECTIOUS AGENT DETECTION BY NUCLEIC ACID (DNA OR RNA); SEVERE ACUTE RESPIRATORY SYNDROME CORONAVIRUS 2 (SARS-COV-2) (CORONAVIRUS DISEASE [COVID-19]), AMPLIFIED PROBE TECHNIQUE, MAKING USE OF HIGH THROUGHPUT TECHNOLOGIES AS DESCRIBED BY CMS-2020-01-R: HCPCS | Performed by: NURSE PRACTITIONER

## 2022-05-24 PROCEDURE — 99213 OFFICE O/P EST LOW 20 MIN: CPT | Performed by: NURSE PRACTITIONER

## 2022-05-24 PROCEDURE — U0005 INFEC AGEN DETEC AMPLI PROBE: HCPCS | Performed by: NURSE PRACTITIONER

## 2022-05-24 PROCEDURE — 87651 STREP A DNA AMP PROBE: CPT | Performed by: NURSE PRACTITIONER

## 2022-05-24 PROCEDURE — 87804 INFLUENZA ASSAY W/OPTIC: CPT | Performed by: NURSE PRACTITIONER

## 2022-05-24 RX ORDER — IBUPROFEN 600 MG/1
600 TABLET, FILM COATED ORAL
COMMUNITY
Start: 2022-01-07

## 2022-05-24 ASSESSMENT — ENCOUNTER SYMPTOMS
SORE THROAT: 1
CHILLS: 1
APPETITE CHANGE: 1
HEADACHES: 0
FEVER: 1
VOMITING: 0
DIARRHEA: 1
DYSURIA: 0
RHINORRHEA: 1
ACTIVITY CHANGE: 1
MYALGIAS: 1
COUGH: 0
FATIGUE: 1

## 2022-05-24 NOTE — PATIENT INSTRUCTIONS
Please consider Zyrtec and Flonase Nasal Spray, over the counter agents for some relief of nasal congestion and pharyngitis.
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
## 2022-11-02 NOTE — PROGRESS NOTES
Ely Lott is a 32 year old year old who is here today for an early postpartum exam.      Doing well after recent delivery- reviewed. Had 4th degree tear and reports normal BMs- instructions given.  No signif signs, symptoms or concerns. Here with family. Breast feeding.       Past medical, obstetrical, surgical, family and social history reviewed and as noted or updated in chart.      Exam: Abdomen and pelvis are negative.  Perineum and lower vaginal repair intact and non-tender without hematoma.    A/P: Satisfactory postpartum progress. RTC in 4 weeks for PPE final recheck.  Taper off narcotics. Medications and prescriptions given as noted.  I reviewed side effects, risks, benefits and instructions on proper use.    Kelvin Waddell MD       Patient is due for follow up please schedule  No further refills of Adderall until follow up

## 2022-11-15 ASSESSMENT — ENCOUNTER SYMPTOMS
SHORTNESS OF BREATH: 0
CHILLS: 0
MYALGIAS: 0
NAUSEA: 0
JOINT SWELLING: 0
PARESTHESIAS: 0
DIZZINESS: 0
HEMATOCHEZIA: 0
DIARRHEA: 0
ABDOMINAL PAIN: 1
ARTHRALGIAS: 0
NERVOUS/ANXIOUS: 0
PALPITATIONS: 0
CONSTIPATION: 0
HEARTBURN: 1
WEAKNESS: 0
EYE PAIN: 0
HEMATURIA: 0
COUGH: 0
SORE THROAT: 0
FREQUENCY: 0
FEVER: 0
DYSURIA: 0
HEADACHES: 0
BREAST MASS: 0

## 2022-11-18 ENCOUNTER — OFFICE VISIT (OUTPATIENT)
Dept: FAMILY MEDICINE | Facility: CLINIC | Age: 37
End: 2022-11-18
Payer: COMMERCIAL

## 2022-11-18 VITALS
OXYGEN SATURATION: 100 % | HEIGHT: 65 IN | SYSTOLIC BLOOD PRESSURE: 128 MMHG | HEART RATE: 93 BPM | TEMPERATURE: 97.5 F | DIASTOLIC BLOOD PRESSURE: 82 MMHG | WEIGHT: 159 LBS | BODY MASS INDEX: 26.49 KG/M2

## 2022-11-18 DIAGNOSIS — R10.13 ABDOMINAL PAIN, EPIGASTRIC: ICD-10-CM

## 2022-11-18 DIAGNOSIS — Z00.00 ROUTINE HISTORY AND PHYSICAL EXAMINATION OF ADULT: ICD-10-CM

## 2022-11-18 LAB
FASTING STATUS PATIENT QL REPORTED: YES
GLUCOSE BLD-MCNC: 99 MG/DL (ref 70–99)
TSH SERPL DL<=0.005 MIU/L-ACNC: 1.53 MU/L (ref 0.4–4)

## 2022-11-18 PROCEDURE — 90471 IMMUNIZATION ADMIN: CPT | Performed by: FAMILY MEDICINE

## 2022-11-18 PROCEDURE — 90686 IIV4 VACC NO PRSV 0.5 ML IM: CPT | Performed by: FAMILY MEDICINE

## 2022-11-18 PROCEDURE — 36415 COLL VENOUS BLD VENIPUNCTURE: CPT | Performed by: FAMILY MEDICINE

## 2022-11-18 PROCEDURE — 99395 PREV VISIT EST AGE 18-39: CPT | Mod: 25 | Performed by: FAMILY MEDICINE

## 2022-11-18 PROCEDURE — 82947 ASSAY GLUCOSE BLOOD QUANT: CPT | Performed by: FAMILY MEDICINE

## 2022-11-18 PROCEDURE — 84443 ASSAY THYROID STIM HORMONE: CPT | Performed by: FAMILY MEDICINE

## 2022-11-18 ASSESSMENT — ENCOUNTER SYMPTOMS
DIZZINESS: 0
HEARTBURN: 1
DIARRHEA: 0
FEVER: 0
DYSURIA: 0
SHORTNESS OF BREATH: 0
HEMATOCHEZIA: 0
HEADACHES: 0
ARTHRALGIAS: 0
FREQUENCY: 0
JOINT SWELLING: 0
NAUSEA: 0
CONSTIPATION: 0
MYALGIAS: 0
NERVOUS/ANXIOUS: 0
HEMATURIA: 0
WEAKNESS: 0
PARESTHESIAS: 0
PALPITATIONS: 0
CHILLS: 0
EYE PAIN: 0
ABDOMINAL PAIN: 1
BREAST MASS: 0
SORE THROAT: 0
COUGH: 0
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
## 2023-03-27 ENCOUNTER — MYC MEDICAL ADVICE (OUTPATIENT)
Dept: FAMILY MEDICINE | Facility: CLINIC | Age: 38
End: 2023-03-27
Payer: COMMERCIAL

## 2023-03-29 ENCOUNTER — OFFICE VISIT (OUTPATIENT)
Dept: URGENT CARE | Facility: URGENT CARE | Age: 38
End: 2023-03-29
Payer: COMMERCIAL

## 2023-03-29 VITALS
SYSTOLIC BLOOD PRESSURE: 144 MMHG | DIASTOLIC BLOOD PRESSURE: 64 MMHG | RESPIRATION RATE: 16 BRPM | TEMPERATURE: 98.5 F | WEIGHT: 152.6 LBS | HEART RATE: 90 BPM | OXYGEN SATURATION: 99 % | BODY MASS INDEX: 25.59 KG/M2

## 2023-03-29 DIAGNOSIS — R30.0 DYSURIA: ICD-10-CM

## 2023-03-29 DIAGNOSIS — N30.00 ACUTE CYSTITIS WITHOUT HEMATURIA: Primary | ICD-10-CM

## 2023-03-29 LAB
ALBUMIN UR-MCNC: 100 MG/DL
APPEARANCE UR: ABNORMAL
BACTERIA #/AREA URNS HPF: ABNORMAL /HPF
BILIRUB UR QL STRIP: NEGATIVE
COLOR UR AUTO: YELLOW
GLUCOSE UR STRIP-MCNC: NEGATIVE MG/DL
HCG UR QL: NEGATIVE
HGB UR QL STRIP: ABNORMAL
KETONES UR STRIP-MCNC: 15 MG/DL
LEUKOCYTE ESTERASE UR QL STRIP: ABNORMAL
NITRATE UR QL: POSITIVE
PH UR STRIP: 6 [PH] (ref 5–7)
RBC #/AREA URNS AUTO: >100 /HPF
SP GR UR STRIP: 1.02 (ref 1–1.03)
SQUAMOUS #/AREA URNS AUTO: ABNORMAL /LPF
UROBILINOGEN UR STRIP-ACNC: 0.2 E.U./DL
WBC #/AREA URNS AUTO: ABNORMAL /HPF

## 2023-03-29 PROCEDURE — 81001 URINALYSIS AUTO W/SCOPE: CPT | Performed by: PHYSICIAN ASSISTANT

## 2023-03-29 PROCEDURE — 99214 OFFICE O/P EST MOD 30 MIN: CPT | Performed by: PHYSICIAN ASSISTANT

## 2023-03-29 PROCEDURE — 81025 URINE PREGNANCY TEST: CPT | Performed by: PHYSICIAN ASSISTANT

## 2023-03-29 PROCEDURE — 87086 URINE CULTURE/COLONY COUNT: CPT | Performed by: PHYSICIAN ASSISTANT

## 2023-03-29 PROCEDURE — 87186 SC STD MICRODIL/AGAR DIL: CPT | Performed by: PHYSICIAN ASSISTANT

## 2023-03-29 RX ORDER — NITROFURANTOIN 25; 75 MG/1; MG/1
100 CAPSULE ORAL 2 TIMES DAILY
Qty: 14 CAPSULE | Refills: 0 | Status: SHIPPED | OUTPATIENT
Start: 2023-03-29 | End: 2023-04-05

## 2023-03-29 ASSESSMENT — ENCOUNTER SYMPTOMS
DIARRHEA: 0
DYSURIA: 1
HEMATURIA: 0
VOMITING: 0
RESPIRATORY NEGATIVE: 1
CARDIOVASCULAR NEGATIVE: 1
ENDOCRINE NEGATIVE: 1
LIGHT-HEADEDNESS: 0
FLANK PAIN: 0
CONSTITUTIONAL NEGATIVE: 1
MUSCULOSKELETAL NEGATIVE: 1
HEADACHES: 0
SORE THROAT: 0
FREQUENCY: 1
RHINORRHEA: 0
PALPITATIONS: 0
FEVER: 0
ACTIVITY CHANGE: 0
POLYDIPSIA: 0
NEUROLOGICAL NEGATIVE: 1
NECK STIFFNESS: 0
DIZZINESS: 0
GASTROINTESTINAL NEGATIVE: 1
MYALGIAS: 0
COUGH: 0
ADENOPATHY: 0
FATIGUE: 0
NECK PAIN: 0
CHILLS: 0
ABDOMINAL PAIN: 0
SHORTNESS OF BREATH: 0
WEAKNESS: 0
NAUSEA: 0

## 2023-03-29 NOTE — PROGRESS NOTES
"Chief Complaint:    Chief Complaint   Patient presents with     Rule out Urinary Tract Infection     Beginning Sunday, patient woke up with dysuria, odorous urine, \"insane\" flank pain and lower abdominal cramping. Denies hematuria.      ASSESSMENT     1. Acute cystitis without hematuria    2. Dysuria         PLAN    Patient is in no acute distress.  She is afebrile with stable vital signs.  Abdominal exam was benign.    Urinalysis discussed with patient  We will call with culture results if resistant.  Urine pregnancy was negative.  Rx for Macrobid today  Follow up with PCP in 1 week if symptoms are not improving.  Worrisome symptoms discussed with instructions to go to the ED.  Patient verbalized understanding and agreed with this plan.    Labs:     Results for orders placed or performed in visit on 03/29/23   UA Macro with Reflex to Micro and Culture - lab collect     Status: Abnormal    Specimen: Urine, Midstream   Result Value Ref Range    Color Urine Yellow Colorless, Straw, Light Yellow, Yellow    Appearance Urine Cloudy (A) Clear    Glucose Urine Negative Negative mg/dL    Bilirubin Urine Negative Negative    Ketones Urine 15 (A) Negative mg/dL    Specific Gravity Urine 1.020 1.003 - 1.035    Blood Urine Large (A) Negative    pH Urine 6.0 5.0 - 7.0    Protein Albumin Urine 100 (A) Negative mg/dL    Urobilinogen Urine 0.2 0.2, 1.0 E.U./dL    Nitrite Urine Positive (A) Negative    Leukocyte Esterase Urine Moderate (A) Negative   HCG Qual, Urine ([id number])     Status: Normal   Result Value Ref Range    hCG Urine Qualitative Negative Negative   Urine Microscopic Exam     Status: Abnormal   Result Value Ref Range    Bacteria Urine Moderate (A) None Seen /HPF    RBC Urine >100 (A) 0-2 /HPF /HPF    WBC Urine  (A) 0-5 /HPF /HPF    Squamous Epithelials Urine Few (A) None Seen /LPF       Problem history    Patient Active Problem List   Diagnosis     Chronic neck pain       Current Meds    Current Outpatient " Medications:      ibuprofen (ADVIL/MOTRIN) 600 MG tablet, Take 600 mg by mouth, Disp: , Rfl:      Multiple Vitamins-Minerals (WOMENS MULTIVITAMIN PO), , Disp: , Rfl:      nitroFURantoin macrocrystal-monohydrate (MACROBID) 100 MG capsule, Take 1 capsule (100 mg) by mouth 2 times daily for 7 days, Disp: 14 capsule, Rfl: 0    Allergies  Allergies   Allergen Reactions     Omnicef [Cefdinir] Swelling     Penicillins Hives       SUBJECTIVE    HPI:  Ely Lott is a 37 year old female who has symptoms of urinary dysuria, urgency, frequency and suprapubic pain and pressure for 2 day(s).  she denies back pain, nausea, vomiting, fever and chills, flank pain, vaginal discharge, and vaginal odor.    ROS:      Review of Systems   Constitutional: Negative.  Negative for activity change, chills, fatigue and fever.   HENT: Negative for congestion, ear pain, rhinorrhea and sore throat.    Respiratory: Negative.  Negative for cough and shortness of breath.    Cardiovascular: Negative.  Negative for chest pain and palpitations.   Gastrointestinal: Negative.  Negative for abdominal pain, diarrhea, nausea and vomiting.   Endocrine: Negative.  Negative for polydipsia and polyuria.   Genitourinary: Positive for dysuria, frequency and urgency. Negative for flank pain, hematuria, pelvic pain, vaginal discharge and vaginal pain.   Musculoskeletal: Negative.  Negative for myalgias, neck pain and neck stiffness.   Allergic/Immunologic: Negative for immunocompromised state.   Neurological: Negative.  Negative for dizziness, weakness, light-headedness and headaches.   Hematological: Negative for adenopathy.       Family History   Family History   Problem Relation Age of Onset     Asthma Mother      Diabetes Father      Myocardial Infarction Father 53     Coronary Artery Disease Father      Myocardial Infarction Paternal Uncle      Coronary Artery Disease Paternal Uncle      Hyperlipidemia No family hx of      Breast Cancer No family hx of       Colon Cancer No family hx of      Depression No family hx of      Anxiety Disorder No family hx of      Thyroid Disease No family hx of         Social History  Social History     Socioeconomic History     Marital status:      Spouse name: Kelvin     Number of children: 3     Years of education: Not on file     Highest education level: Not on file   Occupational History     Occupation:      Comment: self-employed   Tobacco Use     Smoking status: Never     Smokeless tobacco: Never   Vaping Use     Vaping Use: Never used   Substance and Sexual Activity     Alcohol use: Not Currently     Drug use: No     Sexual activity: Yes     Partners: Male     Birth control/protection: None   Other Topics Concern     Parent/sibling w/ CABG, MI or angioplasty before 65F 55M? Not Asked   Social History Narrative     Not on file     Social Determinants of Health     Financial Resource Strain: Not on file   Food Insecurity: Not on file   Transportation Needs: Not on file   Physical Activity: Not on file   Stress: Not on file   Social Connections: Not on file   Intimate Partner Violence: Not on file   Housing Stability: Not on file           OBJECTIVE     Vital signs noted and reviewed by Naeem Tong PA-C  BP (!) 144/64 (BP Location: Left arm, Patient Position: Sitting, Cuff Size: Adult Regular)   Pulse 90   Temp 98.5  F (36.9  C) (Tympanic)   Resp 16   Wt 69.2 kg (152 lb 9.6 oz)   LMP 03/15/2023 (Exact Date)   SpO2 99%   Breastfeeding No   BMI 25.59 kg/m       Physical Exam  Vitals and nursing note reviewed.   Constitutional:       General: She is not in acute distress.     Appearance: Normal appearance. She is well-developed. She is not ill-appearing, toxic-appearing or diaphoretic.   HENT:      Head: Normocephalic and atraumatic.      Right Ear: Tympanic membrane and external ear normal.      Left Ear: Tympanic membrane and external ear normal.   Eyes:      Pupils: Pupils are equal, round, and  reactive to light.   Cardiovascular:      Rate and Rhythm: Normal rate and regular rhythm.      Heart sounds: Normal heart sounds. No murmur heard.    No friction rub. No gallop.   Pulmonary:      Effort: Pulmonary effort is normal. No respiratory distress.      Breath sounds: Normal breath sounds. No wheezing or rales.   Chest:      Chest wall: No tenderness.   Abdominal:      General: Bowel sounds are normal. There is no distension.      Palpations: Abdomen is soft. Abdomen is not rigid. There is no mass.      Tenderness: There is no abdominal tenderness. There is no guarding or rebound. Negative signs include Malave's sign and McBurney's sign.   Musculoskeletal:      Cervical back: Normal range of motion and neck supple.   Lymphadenopathy:      Cervical: No cervical adenopathy.   Skin:     General: Skin is warm and dry.   Neurological:      Mental Status: She is alert and oriented to person, place, and time.      Cranial Nerves: No cranial nerve deficit.      Deep Tendon Reflexes: Reflexes are normal and symmetric.   Psychiatric:         Behavior: Behavior normal. Behavior is cooperative.         Thought Content: Thought content normal.         Judgment: Judgment normal.             Naeem Tong PA-C  3/29/2023, 9:56 AM

## 2023-03-30 LAB — BACTERIA UR CULT: ABNORMAL

## 2023-04-20 ENCOUNTER — E-VISIT (OUTPATIENT)
Dept: URGENT CARE | Facility: CLINIC | Age: 38
End: 2023-04-20
Payer: COMMERCIAL

## 2023-04-20 ENCOUNTER — MYC MEDICAL ADVICE (OUTPATIENT)
Dept: FAMILY MEDICINE | Facility: CLINIC | Age: 38
End: 2023-04-20
Payer: COMMERCIAL

## 2023-04-20 DIAGNOSIS — R30.0 DYSURIA: Primary | ICD-10-CM

## 2023-04-20 PROCEDURE — 99421 OL DIG E/M SVC 5-10 MIN: CPT | Performed by: PREVENTIVE MEDICINE

## 2023-04-20 NOTE — PATIENT INSTRUCTIONS
Dear Ely Lott,     After reviewing your responses, I would like you to come in for a urine test to make sure we treat you correctly. This urine test is to evaluate you for a possible urinary tract infection, and should be scheduled for today or tomorrow. Schedule a Lab Only appointment here.     Lab appointments are not available at most locations on the weekends. If no Lab Only appointment is available, you should be seen in any of our convenient Walk-in or Urgent Care Centers, which can be found on our website here.     You will receive instructions with your results in zandat once they are available.     If your symptoms worsen, you develop pain in your back or stomach, develop fevers, or are not improving in 5 days, please contact your primary care provider for an appointment or visit a Walk-in or Urgent Care Center to be seen.     Thanks again for choosing us as your health care partner,     Samuel Garduno MD, MD    Dysuria with Uncertain Cause (Adult)    The urethra is the tube that allows urine to pass out of the body. In a woman, the urethra is the opening above the vagina. In men, the urethra is the opening on the tip of the penis. Dysuria is the feeling of pain or burning in the urethra when passing urine.   Dysuria can be caused by anything that irritates or inflames the urethra. An infection or chemical irritation can cause this reaction. A bladder infection is the most common cause of dysuria in adults. A urine test can diagnose this. A bladder infection needs antibiotic treatment.   Soaps, lotions, colognes, and feminine hygiene products can cause dysuria. So can birth control jellies, creams, and foams. It will go away 1 to 3 days after discontinuing the use of these irritants.   Sexually transmitted infections (STIs), such as chlamydia or gonorrhea, can cause dysuria. Your healthcare provider may take a culture sample. Your provider may start you on antibiotic medicine  before the culture test returns.   In women who have gone through menopause, dysuria can be from dryness in the lining of the urethra. This can be treated with hormones. Dysuria becomes long-term (chronic) when it lasts for weeks or months. You may need to see a specialist (urologist) to diagnose and treat chronic dysuria.   Home care  These home care tips may help:    Don't use any chemicals or products that you think may be causing your symptoms.    If you were given a prescription medicine, take as directed. Take it until it's all used up.    If a culture was taken, don't have sex until you have been told that it is negative. A negative culture means you don't have an infection. Then follow your healthcare provider's advice to treat your condition.  If a culture was done and it is positive:     Both you and your sexual partner may need to be treated. This is true even if your partner has no symptoms.    Contact your healthcare provider or go to an urgent care clinic or the public health department to be looked at and treated.    Don't have sex until both you and your partner have finished all antibiotics and your healthcare provider says you are no longer contagious.    Learn about and use safe sex practices. The safest sex is with a partner who has tested negative and only has sex with you. Condoms can prevent STIs from spreading, but they aren't a guarantee.  Follow-up care  Follow up with your healthcare provider, or as advised. If a culture was taken, you may call as directed for the results. If you have an STI, follow up with your provider or the public health department for a complete STI screening, including HIV testing. For more information, contact CDC-INFO at 162-616-4284.   When to call your healthcare provider   Call your healthcare provider right away if any of these occur:    You aren't better after 3 days of treatment    Fever of 100.4 F (38 C) or higher, or as directed by your healthcare  provider    Back or belly pain that gets worse    You can't urinate because of pain    New discharge from the urethra, vagina, or penis    Painful sores on the penis    Rash or joint pain    Painful lumps (lymph nodes) in the groin    Testicle pain or swelling of the scrotum  Krystyna last reviewed this educational content on 6/1/2022 2000-2022 [employer]. All rights reserved. This information is not intended as a substitute for professional medical care. Always follow your healthcare professional's instructions.

## 2023-04-21 ENCOUNTER — LAB (OUTPATIENT)
Dept: LAB | Facility: CLINIC | Age: 38
End: 2023-04-21
Payer: COMMERCIAL

## 2023-04-21 DIAGNOSIS — R30.0 DYSURIA: ICD-10-CM

## 2023-04-21 LAB
ALBUMIN UR-MCNC: NEGATIVE MG/DL
APPEARANCE UR: CLEAR
BILIRUB UR QL STRIP: NEGATIVE
COLOR UR AUTO: COLORLESS
GLUCOSE UR STRIP-MCNC: NEGATIVE MG/DL
HGB UR QL STRIP: NEGATIVE
KETONES UR STRIP-MCNC: NEGATIVE MG/DL
LEUKOCYTE ESTERASE UR QL STRIP: NEGATIVE
NITRATE UR QL: NEGATIVE
PH UR STRIP: 6 [PH] (ref 5–7)
SKIP: NORMAL
SP GR UR STRIP: 1 (ref 1–1.03)
UROBILINOGEN UR STRIP-MCNC: NORMAL MG/DL

## 2023-04-21 PROCEDURE — 81003 URINALYSIS AUTO W/O SCOPE: CPT

## 2023-09-26 ENCOUNTER — OFFICE VISIT (OUTPATIENT)
Dept: URGENT CARE | Facility: URGENT CARE | Age: 38
End: 2023-09-26
Payer: COMMERCIAL

## 2023-09-26 VITALS
SYSTOLIC BLOOD PRESSURE: 142 MMHG | OXYGEN SATURATION: 100 % | BODY MASS INDEX: 26.41 KG/M2 | HEART RATE: 91 BPM | RESPIRATION RATE: 16 BRPM | TEMPERATURE: 98.2 F | DIASTOLIC BLOOD PRESSURE: 93 MMHG | WEIGHT: 157.5 LBS

## 2023-09-26 DIAGNOSIS — R39.15 URINARY URGENCY: ICD-10-CM

## 2023-09-26 DIAGNOSIS — N30.00 ACUTE CYSTITIS WITHOUT HEMATURIA: Primary | ICD-10-CM

## 2023-09-26 DIAGNOSIS — R10.32 LLQ ABDOMINAL PAIN: ICD-10-CM

## 2023-09-26 DIAGNOSIS — B37.31 YEAST INFECTION OF THE VAGINA: ICD-10-CM

## 2023-09-26 LAB
ALBUMIN UR-MCNC: NEGATIVE MG/DL
APPEARANCE UR: CLEAR
BACTERIA #/AREA URNS HPF: ABNORMAL /HPF
BILIRUB UR QL STRIP: NEGATIVE
COLOR UR AUTO: YELLOW
GLUCOSE UR STRIP-MCNC: NEGATIVE MG/DL
HCG UR QL: NEGATIVE
HGB UR QL STRIP: ABNORMAL
KETONES UR STRIP-MCNC: NEGATIVE MG/DL
LEUKOCYTE ESTERASE UR QL STRIP: NEGATIVE
MUCOUS THREADS #/AREA URNS LPF: PRESENT /LPF
NITRATE UR QL: NEGATIVE
PH UR STRIP: 7 [PH] (ref 5–7)
RBC #/AREA URNS AUTO: ABNORMAL /HPF
SP GR UR STRIP: 1.02 (ref 1–1.03)
SQUAMOUS #/AREA URNS AUTO: ABNORMAL /LPF
TRANS CELLS #/AREA URNS HPF: ABNORMAL /HPF
UROBILINOGEN UR STRIP-ACNC: 0.2 E.U./DL
WBC #/AREA URNS AUTO: ABNORMAL /HPF
YEAST #/AREA URNS HPF: ABNORMAL /HPF

## 2023-09-26 PROCEDURE — 87088 URINE BACTERIA CULTURE: CPT | Performed by: PHYSICIAN ASSISTANT

## 2023-09-26 PROCEDURE — 81025 URINE PREGNANCY TEST: CPT | Performed by: PHYSICIAN ASSISTANT

## 2023-09-26 PROCEDURE — 99214 OFFICE O/P EST MOD 30 MIN: CPT | Performed by: PHYSICIAN ASSISTANT

## 2023-09-26 PROCEDURE — 81001 URINALYSIS AUTO W/SCOPE: CPT | Performed by: PHYSICIAN ASSISTANT

## 2023-09-26 PROCEDURE — 87086 URINE CULTURE/COLONY COUNT: CPT | Performed by: PHYSICIAN ASSISTANT

## 2023-09-26 PROCEDURE — 87186 SC STD MICRODIL/AGAR DIL: CPT | Performed by: PHYSICIAN ASSISTANT

## 2023-09-26 RX ORDER — NITROFURANTOIN 25; 75 MG/1; MG/1
100 CAPSULE ORAL 2 TIMES DAILY
Qty: 14 CAPSULE | Refills: 0 | Status: SHIPPED | OUTPATIENT
Start: 2023-09-26 | End: 2023-10-03

## 2023-09-26 RX ORDER — FLUCONAZOLE 150 MG/1
150 TABLET ORAL ONCE
Qty: 2 TABLET | Refills: 0 | Status: SHIPPED | OUTPATIENT
Start: 2023-09-26 | End: 2023-09-26
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
## 2023-09-27 ENCOUNTER — MYC MEDICAL ADVICE (OUTPATIENT)
Dept: FAMILY MEDICINE | Facility: CLINIC | Age: 38
End: 2023-09-27
Payer: COMMERCIAL

## 2023-09-27 DIAGNOSIS — N30.00 ACUTE CYSTITIS WITHOUT HEMATURIA: Primary | ICD-10-CM

## 2023-09-27 RX ORDER — SULFAMETHOXAZOLE/TRIMETHOPRIM 800-160 MG
1 TABLET ORAL 2 TIMES DAILY
Qty: 14 TABLET | Refills: 0 | Status: SHIPPED | OUTPATIENT
Start: 2023-09-27 | End: 2023-10-04

## 2023-09-27 NOTE — TELEPHONE ENCOUNTER
See MyChart encounter below. Routing to provider to review and advise.    Patient seen at [facility] yesterday, urine culture came back today, patient on macrobid for UTI, patient wanting to be sure abx is appropriate for culture results below:          Tea Waldrop, JENISEN, RN  [facility]

## 2023-09-27 NOTE — TELEPHONE ENCOUNTER
Called microbiology lab and requested  additional susceptibilities including bactrimdue to patient is allergic to pcn and cephalosporins-    Called patient and instructed  her to stop the nitrofurantoin and will start bactrim 2 times a day for 7 days- sent to pharmacy  as noted     Ana Bush MD

## 2023-09-29 LAB — BACTERIA UR CULT: ABNORMAL

## 2023-09-29 NOTE — TELEPHONE ENCOUNTER
Patient calling in stating she missed a call from an UC provider. Writer reviewed chart, noted UC provider result note below:        Writer relayed provider's note to patient, patient verbalized understanding. Aware to keep on the Bactrim and if no resolution, contact PCPs office. No further questions or concerns.      JENISE ToroN, RN  [facility]

## 2023-10-04 ENCOUNTER — MYC MEDICAL ADVICE (OUTPATIENT)
Dept: FAMILY MEDICINE | Facility: CLINIC | Age: 38
End: 2023-10-04
Payer: COMMERCIAL

## 2023-10-19 ENCOUNTER — PATIENT OUTREACH (OUTPATIENT)
Dept: CARE COORDINATION | Facility: CLINIC | Age: 38
End: 2023-10-19
Payer: COMMERCIAL

## 2023-10-30 ENCOUNTER — VIRTUAL VISIT (OUTPATIENT)
Dept: FAMILY MEDICINE | Facility: CLINIC | Age: 38
End: 2023-10-30
Payer: COMMERCIAL

## 2023-10-30 ENCOUNTER — LAB (OUTPATIENT)
Dept: LAB | Facility: CLINIC | Age: 38
End: 2023-10-30
Payer: COMMERCIAL

## 2023-10-30 DIAGNOSIS — R30.0 DYSURIA: ICD-10-CM

## 2023-10-30 DIAGNOSIS — B37.31 CANDIDIASIS OF VAGINA: ICD-10-CM

## 2023-10-30 DIAGNOSIS — R30.0 DYSURIA: Primary | ICD-10-CM

## 2023-10-30 LAB
ALBUMIN UR-MCNC: NEGATIVE MG/DL
APPEARANCE UR: CLEAR
BACTERIA #/AREA URNS HPF: ABNORMAL /HPF
BILIRUB UR QL STRIP: NEGATIVE
CLUE CELLS: ABNORMAL
COLOR UR AUTO: YELLOW
GLUCOSE UR STRIP-MCNC: NEGATIVE MG/DL
HGB UR QL STRIP: ABNORMAL
KETONES UR STRIP-MCNC: NEGATIVE MG/DL
LEUKOCYTE ESTERASE UR QL STRIP: NEGATIVE
NITRATE UR QL: NEGATIVE
PH UR STRIP: 6 [PH] (ref 5–7)
RBC #/AREA URNS AUTO: ABNORMAL /HPF
SP GR UR STRIP: 1.02 (ref 1–1.03)
SQUAMOUS #/AREA URNS AUTO: ABNORMAL /LPF
TRICHOMONAS, WET PREP: ABNORMAL
UROBILINOGEN UR STRIP-ACNC: 0.2 E.U./DL
WBC #/AREA URNS AUTO: ABNORMAL /HPF
WBC'S/HIGH POWER FIELD, WET PREP: ABNORMAL
YEAST #/AREA URNS HPF: ABNORMAL /HPF
YEAST, WET PREP: PRESENT

## 2023-10-30 PROCEDURE — 87210 SMEAR WET MOUNT SALINE/INK: CPT | Mod: VID | Performed by: NURSE PRACTITIONER

## 2023-10-30 PROCEDURE — 81001 URINALYSIS AUTO W/SCOPE: CPT

## 2023-10-30 PROCEDURE — 99213 OFFICE O/P EST LOW 20 MIN: CPT | Mod: VID | Performed by: NURSE PRACTITIONER

## 2023-10-30 NOTE — PROGRESS NOTES
"    Instructions Relayed to Patient by Virtual Roomer:     Reminded patient to ensure they were logged on to virtual visit by arrival time listed. Documented in appointment notes if patient had flexibility to initiate visit sooner than arrival time. If pediatric virtual visit, ensured pediatric patient along with parent/guardian will be present for video visit.     Patient offered the website [email or web address] and/or phone number to [facility] Help line: [phone]      Lizbeth is a 37 year old who is being evaluated via a billable video visit.      How would you like to obtain your AVS? [employer]  If the video visit is dropped, the invitation should be resent by: Text to cell phone: [phone]  Will anyone else be joining your video visit? No          Assessment & Plan     Dysuria  UA consistent with vaginal yeast. Reviewed genital hygiene and the importance of staying well hydrated  - REVIEW OF HEALTH MAINTENANCE PROTOCOL ORDERS  - UA Macroscopic with reflex to Microscopic and Culture - Lab Collect  - Wet prep - Clinic Collect    Candidiasis of vagina  Treating with diflucan.  - fluconazole (DIFLUCAN) 150 MG tablet  Dispense: 1 tablet; Refill: 0               BMI:   Estimated body mass index is 26.41 kg/m  as calculated from the following:    Height as of 11/18/22: 1.645 m (5' 4.75\").    Weight as of 9/26/23: 71.4 kg (157 lb 8 oz).           MALIK Sheldon [facility]    Dalia Nieves is a 37 year old, presenting for the following health issues:  UTI        10/30/2023    11:29 AM   Additional Questions   Roomed by valdemar       History of Present Illness       Reason for visit:  UTI    She eats 2-3 servings of fruits and vegetables daily.She consumes 1 sweetened beverage(s) daily.She exercises with enough effort to increase her heart rate 30 to 60 minutes per day.  She exercises with enough effort to increase her heart rate 3 or less days per week.   She " is taking medications regularly.       Genitourinary - Female  Onset/Duration: had UTI end of September, pt was seen in  on 9/26/2023  Description:   Painful urination (Dysuria): not pain, only pressure           Frequency: No  Blood in urine (Hematuria): No  Delay in urine (Hesitency): No  Intensity: mild  Progression of Symptoms:  intermittent  Accompanying Signs & Symptoms:  Fever/chills: YES- last week  Flank pain: YES  Nausea and vomiting: No  Vaginal symptoms: discharge and itching  Abdominal/Pelvic Pain: YES  History:   History of frequent UTI s: YES  History of kidney stones: No  Sexually Active: YES  Possibility of pregnancy: No  Precipitating or alleviating factors: None  Therapies tried and outcome: Increase fluid intake, tried antibiotics-not effective  Has had 2 UTI's in the past year.          Review of Systems   Constitutional, HEENT, cardiovascular, pulmonary, gi and gu systems are negative, except as otherwise noted.      Objective           Vitals:  No vitals were obtained today due to virtual visit.    Physical Exam   GENERAL: Healthy, alert and no distress  EYES: Eyes grossly normal to inspection.  No discharge or erythema, or obvious scleral/conjunctival abnormalities.  RESP: No audible wheeze, cough, or visible cyanosis.  No visible retractions or increased work of breathing.    SKIN: Visible skin clear. No significant rash, abnormal pigmentation or lesions.  NEURO: Cranial nerves grossly intact.  Mentation and speech appropriate for age.  PSYCH: Mentation appears normal, affect normal/bright, judgement and insight intact, normal speech and appearance well-groomed.    Results for orders placed or performed in visit on 10/30/23 (from the past 24 hour(s))   Wet prep - Clinic Collect    Specimen: Vagina; Swab   Result Value Ref Range    Trichomonas Absent Absent    Yeast Present (A) Absent    Clue Cells Absent Absent    WBCs/high power field 2+ (A) None               Video-Visit Details    Type  of service:  Video Visit     Originating Location (pt. Location): Home    Distant Location (provider location):  On-site  Platform used for Video Visit: Moshe

## 2023-10-31 ENCOUNTER — MYC MEDICAL ADVICE (OUTPATIENT)
Dept: FAMILY MEDICINE | Facility: CLINIC | Age: 38
End: 2023-10-31
Payer: COMMERCIAL

## 2023-10-31 RX ORDER — FLUCONAZOLE 150 MG/1
150 TABLET ORAL ONCE
Qty: 1 TABLET | Refills: 0 | Status: SHIPPED | OUTPATIENT
Start: 2023-10-31 | End: 2023-10-31

## 2023-11-02 ENCOUNTER — PATIENT OUTREACH (OUTPATIENT)
Dept: CARE COORDINATION | Facility: CLINIC | Age: 38
End: 2023-11-02
Payer: COMMERCIAL

## 2023-11-07 ENCOUNTER — OFFICE VISIT (OUTPATIENT)
Dept: FAMILY MEDICINE | Facility: CLINIC | Age: 38
End: 2023-11-07
Payer: COMMERCIAL

## 2023-11-07 VITALS
HEART RATE: 85 BPM | DIASTOLIC BLOOD PRESSURE: 97 MMHG | RESPIRATION RATE: 10 BRPM | HEIGHT: 65 IN | TEMPERATURE: 98.6 F | SYSTOLIC BLOOD PRESSURE: 143 MMHG | WEIGHT: 158 LBS | OXYGEN SATURATION: 100 % | BODY MASS INDEX: 26.33 KG/M2

## 2023-11-07 DIAGNOSIS — R10.2 PELVIC PAIN IN FEMALE: Primary | ICD-10-CM

## 2023-11-07 LAB
ALBUMIN UR-MCNC: NEGATIVE MG/DL
APPEARANCE UR: CLEAR
BACTERIA #/AREA URNS HPF: ABNORMAL /HPF
BILIRUB UR QL STRIP: NEGATIVE
CLUE CELLS: ABNORMAL
COLOR UR AUTO: YELLOW
GLUCOSE UR STRIP-MCNC: NEGATIVE MG/DL
HGB UR QL STRIP: NEGATIVE
KETONES UR STRIP-MCNC: NEGATIVE MG/DL
LEUKOCYTE ESTERASE UR QL STRIP: ABNORMAL
NITRATE UR QL: NEGATIVE
PH UR STRIP: 7 [PH] (ref 5–7)
RBC #/AREA URNS AUTO: ABNORMAL /HPF
SP GR UR STRIP: 1.01 (ref 1–1.03)
SQUAMOUS #/AREA URNS AUTO: ABNORMAL /LPF
TRICHOMONAS, WET PREP: ABNORMAL
UROBILINOGEN UR STRIP-ACNC: 0.2 E.U./DL
WBC #/AREA URNS AUTO: ABNORMAL /HPF
WBC'S/HIGH POWER FIELD, WET PREP: ABNORMAL
YEAST, WET PREP: ABNORMAL

## 2023-11-07 PROCEDURE — 81001 URINALYSIS AUTO W/SCOPE: CPT | Performed by: FAMILY MEDICINE

## 2023-11-07 PROCEDURE — 87210 SMEAR WET MOUNT SALINE/INK: CPT | Performed by: FAMILY MEDICINE

## 2023-11-07 PROCEDURE — 99214 OFFICE O/P EST MOD 30 MIN: CPT | Performed by: FAMILY MEDICINE

## 2023-11-07 ASSESSMENT — PAIN SCALES - GENERAL: PAINLEVEL: MODERATE PAIN (5)

## 2023-11-07 NOTE — TELEPHONE ENCOUNTER
Attempted to call patient and schedule with our same day provider today, as there are openings - call went to , left message asking her to call clinic back to discuss appts today      JENISE ToroN, RN  [facility]

## 2023-11-07 NOTE — PATIENT INSTRUCTIONS
At [facility], we strive to deliver an exceptional experience to you, every time we see you. If you receive a survey, please complete it as we do value your feedback.  If you have MyChart, you can expect to receive results automatically within 24 hours of their completion.  Your provider will send a note interpreting your results as well.   If you do not have MyChart, you should receive your results in about a week by mail.    Your care team:                            Family Medicine Internal Medicine   MD Lavelle Soler MD Shantel Branch-Fleming, MD Srinivasa Vaka, MD Katya Belousova, PAFLOR Yuan, MD Pediatrics   Ashwin Smalls, PAMD Jenna Tang MD Amelia Massimini APRN CNP   MALIK Glass CNP, MD Charanya Pasupathi, MD Kathleen Widmer, NP coming October 2023 Same-Day (No follow up visit)    RE Keating PA coming Oct 2023     Clinic hours: Monday - Thursday 7 am-6 pm; Fridays 7 am-5 pm.   Urgent care: Monday - Friday 10 am- 8 pm; Saturday and Sunday 9 am-5 pm.    Clinic: [phone]       [facility]: Monday - Thursday 8 am - 7 pm; Friday 8 am - 6 pm  [facility]: [phone]

## 2023-11-07 NOTE — PROGRESS NOTES
"  Dalia Nieves is a 37 year old, presenting for the following health issues:  Vaginal Problem        11/7/2023     2:26 PM   Additional Questions   Roomed by Isma       Vaginal Problem     History of Present Illness       Reason for visit:  UTI    She eats 2-3 servings of fruits and vegetables daily.She consumes 1 sweetened beverage(s) daily.She exercises with enough effort to increase her heart rate 30 to 60 minutes per day.  She exercises with enough effort to increase her heart rate 3 or less days per week.   She is taking medications regularly.         Vaginal Symptoms  Onset/Duration: one month  Description:  Vaginal Discharge: yes  Itching (Pruritis): No  Burning sensation:  YES  Odor: N/A  Accompanying Signs & Symptoms:  Urinary symptoms: No  Abdominal pain: YES- Pelvic pressure  Fever: No  History:   Sexually active: No  New Partner: No  Possibility of Pregnancy:  No  Recent antibiotic use: YES  Previous vaginitis issues: YES  Precipitating or alleviating factors: None  Therapies tried and outcome: none    Review of Systems   Genitourinary:  Positive for vaginal discharge.      Patient having normal bm's.     Constitutional, HEENT, cardiovascular, pulmonary, GI, , musculoskeletal, neuro, skin, endocrine and psych systems are negative, except as otherwise noted.      Objective    BP (!) 143/97 (BP Location: Left arm, Patient Position: Sitting, Cuff Size: Adult Regular)   Pulse 85   Temp 98.6  F (37  C) (Oral)   Resp 10   Ht 1.643 m (5' 4.69\")   Wt 71.7 kg (158 lb)   LMP 10/15/2023 (Approximate)   SpO2 100%   BMI 26.55 kg/m    Body mass index is 26.55 kg/m .  Physical Exam   GENERAL: healthy, alert and no distress  NECK: no adenopathy, no asymmetry, masses, or scars and thyroid normal to palpation  RESP: lungs clear to auscultation - no rales, rhonchi or wheezes  CV: regular rate and rhythm, normal S1 S2, no S3 or S4, no murmur, click or rub, no peripheral edema and peripheral pulses " "strong  ABDOMEN: soft, nontender, no hepatosplenomegaly, no masses and bowel sounds normal    A/P:  (R10.2) Pelvic pain in female  (primary encounter diagnosis)  Comment:   Plan: UA with Microscopic reflex to Culture - lab         collect, Wet prep - lab collect, US Pelvic         Complete with Transvaginal        H/o UTI and yeast infection which was treated. Patient continues to have \"lower pelvic pressure.\" Pelvic ultrasound ordered for further evaluation. Repeat UA and wet prep.    Smith Yuan MD                "

## 2023-11-07 NOTE — TELEPHONE ENCOUNTER
Patient returned call, appointment scheduled with Dr. Smith Yuan today at 2:20    Yee BURDENN, RN

## 2023-11-08 ENCOUNTER — ANCILLARY PROCEDURE (OUTPATIENT)
Dept: ULTRASOUND IMAGING | Facility: CLINIC | Age: 38
End: 2023-11-08
Attending: FAMILY MEDICINE
Payer: COMMERCIAL

## 2023-11-08 DIAGNOSIS — R10.2 PELVIC PAIN IN FEMALE: ICD-10-CM

## 2023-11-08 PROCEDURE — 76830 TRANSVAGINAL US NON-OB: CPT | Mod: TC | Performed by: RADIOLOGY

## 2023-11-08 PROCEDURE — 76856 US EXAM PELVIC COMPLETE: CPT | Mod: TC | Performed by: RADIOLOGY

## 2023-11-29 ENCOUNTER — OFFICE VISIT (OUTPATIENT)
Dept: OBGYN | Facility: CLINIC | Age: 38
End: 2023-11-29
Payer: COMMERCIAL

## 2023-11-29 VITALS — HEART RATE: 88 BPM | SYSTOLIC BLOOD PRESSURE: 133 MMHG | OXYGEN SATURATION: 100 % | DIASTOLIC BLOOD PRESSURE: 92 MMHG

## 2023-11-29 DIAGNOSIS — R10.2 PELVIC PAIN IN FEMALE: ICD-10-CM

## 2023-11-29 DIAGNOSIS — N71.1 CHRONIC ENDOMETRITIS: Primary | ICD-10-CM

## 2023-11-29 LAB
ALBUMIN UR-MCNC: NEGATIVE MG/DL
AMORPH CRY #/AREA URNS HPF: ABNORMAL /HPF
APPEARANCE UR: CLEAR
BACTERIA #/AREA URNS HPF: ABNORMAL /HPF
BILIRUB UR QL STRIP: NEGATIVE
COLOR UR AUTO: YELLOW
GLUCOSE UR STRIP-MCNC: NEGATIVE MG/DL
HGB UR QL STRIP: NEGATIVE
KETONES UR STRIP-MCNC: ABNORMAL MG/DL
LEUKOCYTE ESTERASE UR QL STRIP: NEGATIVE
NITRATE UR QL: NEGATIVE
PH UR STRIP: 7 [PH] (ref 5–7)
RBC #/AREA URNS AUTO: ABNORMAL /HPF
SP GR UR STRIP: 1.02 (ref 1–1.03)
SQUAMOUS #/AREA URNS AUTO: ABNORMAL /LPF
UROBILINOGEN UR STRIP-ACNC: 1 E.U./DL
WBC #/AREA URNS AUTO: ABNORMAL /HPF

## 2023-11-29 PROCEDURE — 81001 URINALYSIS AUTO W/SCOPE: CPT | Performed by: GENERAL PRACTICE

## 2023-11-29 PROCEDURE — 99214 OFFICE O/P EST MOD 30 MIN: CPT | Performed by: GENERAL PRACTICE

## 2023-11-29 RX ORDER — DOXYCYCLINE 100 MG/1
100 CAPSULE ORAL 2 TIMES DAILY
Qty: 20 CAPSULE | Refills: 0 | Status: SHIPPED | OUTPATIENT
Start: 2023-11-29 | End: 2023-12-09

## 2023-11-29 NOTE — PATIENT INSTRUCTIONS
If you have labs or imaging done, the results will automatically release in [employer] without an interpretation.  Your health care professional will review those results and send an interpretation with recommendations as soon as possible, but this may be 1-3 business days.    If you have any questions regarding your visit, please contact your care team.     [employer] [facility]: [phone]  [facility] CLINIC HOURS TELEPHONE NUMBER   Sharath PETTY Clint .      Katherine-MARIAN Natarajan-MARIAN Vásquez-Surgery Scheduler  Jayla-         Monday-[place]  8:00 am-4:00 pm  [facility]00 [facility]  Wednesday-[place] 8:00 am-4:00 pm  Thursday-[place] 8:00 am-4:00 pm    Typical Surgery Day Friday [facility]  [address] [place] [address]  PH: [phone] Fax    Imaging Scheduling all locations  PH: [phone]     [facility]  [address]  [place], [place] [address] [phone]    [facility]  [address] [place] [address]  PH: [phone]     **Surgeries** Our Surgery Schedulers will contact you to schedule. If you do not receive a call within 3 business days, please call [phone].  Urgent Care locations:  [facility]       Monday-Friday   10 am - 8 pm  Saturday and Sunday   9 am - 5 pm   [phone]   If you need a medication refill, please contact your pharmacy. Please allow 3 business days for your refill to be completed.  As always, Thank you for trusting us with your healthcare needs!
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
## 2024-01-01 ASSESSMENT — ENCOUNTER SYMPTOMS
PALPITATIONS: 0
FEVER: 0
DIARRHEA: 0
CONSTIPATION: 0
ABDOMINAL PAIN: 0
HEADACHES: 0
NERVOUS/ANXIOUS: 0
ARTHRALGIAS: 0
DYSURIA: 0
HEMATURIA: 0
HEMATOCHEZIA: 0
HEARTBURN: 0
BREAST MASS: 0
PARESTHESIAS: 0
MYALGIAS: 0
COUGH: 0
FREQUENCY: 0
SORE THROAT: 0
DIZZINESS: 0
SHORTNESS OF BREATH: 0
JOINT SWELLING: 0
WEAKNESS: 0
EYE PAIN: 0
CHILLS: 0
NAUSEA: 0

## 2024-01-02 ENCOUNTER — OFFICE VISIT (OUTPATIENT)
Dept: FAMILY MEDICINE | Facility: CLINIC | Age: 39
End: 2024-01-02
Attending: NURSE PRACTITIONER
Payer: COMMERCIAL

## 2024-01-02 VITALS
BODY MASS INDEX: 26.46 KG/M2 | RESPIRATION RATE: 17 BRPM | SYSTOLIC BLOOD PRESSURE: 142 MMHG | HEART RATE: 90 BPM | TEMPERATURE: 98.6 F | HEIGHT: 65 IN | WEIGHT: 158.8 LBS | DIASTOLIC BLOOD PRESSURE: 82 MMHG | OXYGEN SATURATION: 100 %

## 2024-01-02 DIAGNOSIS — R03.0 ELEVATED BP WITHOUT DIAGNOSIS OF HYPERTENSION: ICD-10-CM

## 2024-01-02 DIAGNOSIS — Z23 NEED FOR HEPATITIS B VACCINATION: ICD-10-CM

## 2024-01-02 DIAGNOSIS — Z13.6 CARDIOVASCULAR SCREENING; LDL GOAL LESS THAN 130: ICD-10-CM

## 2024-01-02 DIAGNOSIS — Z00.00 ROUTINE GENERAL MEDICAL EXAMINATION AT A HEALTH CARE FACILITY: Primary | ICD-10-CM

## 2024-01-02 PROCEDURE — 90746 HEPB VACCINE 3 DOSE ADULT IM: CPT | Performed by: NURSE PRACTITIONER

## 2024-01-02 PROCEDURE — 99213 OFFICE O/P EST LOW 20 MIN: CPT | Mod: 25 | Performed by: NURSE PRACTITIONER

## 2024-01-02 PROCEDURE — 80061 LIPID PANEL: CPT | Performed by: NURSE PRACTITIONER

## 2024-01-02 PROCEDURE — 99395 PREV VISIT EST AGE 18-39: CPT | Mod: 25 | Performed by: NURSE PRACTITIONER

## 2024-01-02 PROCEDURE — 80048 BASIC METABOLIC PNL TOTAL CA: CPT | Performed by: NURSE PRACTITIONER

## 2024-01-02 PROCEDURE — 90471 IMMUNIZATION ADMIN: CPT | Performed by: NURSE PRACTITIONER

## 2024-01-02 PROCEDURE — 36415 COLL VENOUS BLD VENIPUNCTURE: CPT | Performed by: NURSE PRACTITIONER

## 2024-01-02 ASSESSMENT — ENCOUNTER SYMPTOMS
NAUSEA: 0
HEARTBURN: 0
COUGH: 0
JOINT SWELLING: 0
CHILLS: 0
FEVER: 0
NERVOUS/ANXIOUS: 0
ARTHRALGIAS: 0
SORE THROAT: 0
DYSURIA: 0
ABDOMINAL PAIN: 0
SHORTNESS OF BREATH: 0
HEMATOCHEZIA: 0
DIZZINESS: 0
BREAST MASS: 0
EYE PAIN: 0
MYALGIAS: 0
HEADACHES: 0
CONSTIPATION: 0
PARESTHESIAS: 0
HEMATURIA: 0
PALPITATIONS: 0
DIARRHEA: 0
FREQUENCY: 0
WEAKNESS: 0

## 2024-01-02 ASSESSMENT — PAIN SCALES - GENERAL: PAINLEVEL: NO PAIN (0)
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
## 2024-01-03 LAB
ANION GAP SERPL CALCULATED.3IONS-SCNC: 9 MMOL/L (ref 7–15)
BUN SERPL-MCNC: 10 MG/DL (ref 6–20)
CALCIUM SERPL-MCNC: 9.2 MG/DL (ref 8.6–10)
CHLORIDE SERPL-SCNC: 104 MMOL/L (ref 98–107)
CHOLEST SERPL-MCNC: 195 MG/DL
CREAT SERPL-MCNC: 0.92 MG/DL (ref 0.51–0.95)
DEPRECATED HCO3 PLAS-SCNC: 28 MMOL/L (ref 22–29)
EGFRCR SERPLBLD CKD-EPI 2021: 82 ML/MIN/1.73M2
FASTING STATUS PATIENT QL REPORTED: NO
GLUCOSE SERPL-MCNC: 90 MG/DL (ref 70–99)
HDLC SERPL-MCNC: 62 MG/DL
LDLC SERPL CALC-MCNC: 95 MG/DL
NONHDLC SERPL-MCNC: 133 MG/DL
POTASSIUM SERPL-SCNC: 4.2 MMOL/L (ref 3.4–5.3)
SODIUM SERPL-SCNC: 141 MMOL/L (ref 135–145)
TRIGL SERPL-MCNC: 189 MG/DL

## 2024-06-26 ENCOUNTER — NURSE TRIAGE (OUTPATIENT)
Dept: FAMILY MEDICINE | Facility: CLINIC | Age: 39
End: 2024-06-26
Payer: COMMERCIAL

## 2024-06-26 NOTE — TELEPHONE ENCOUNTER
"Patient calling about possible reaction to something, doesn't know what may be cause.   Suddenly started to experience a very annoyed, swelling feeling in throat. Feels irritated, feels she has to keep clearing throat and coughing. To her, it feels swollen but is still able to swallow down liquids.  Denies wheezing, shortness of breath, trouble breathing.  Entire body is itching, severe itching,  but no hives and no rash.  Eye lids are slightly swollen.  Tongue feels \"weird\". Feels it may be swollen.   Feels \"off\", not herself, feels something is wrong.  Took a total of 25 mL of children's liquid Benadryl and also took a cetirizine allergy pill.  Unknown if medications are causing any lingering symptoms.  Pt feels faint and woozy-like.  Notes she only is aware of allergy to 2 different antibiotics, which she did not take any time recently. Otherwise has some environmental allergies but doesn't think she was necessarily exposed to these outdoor allergens, but isn't sure.  No history of asthma.  Not feeling better due to taking allergy medications.    Advised for pt to be seen in the ER. Hard to rule out if having more of an anaphylactic type reaction vs severe allergic reaction. Given time of day and that symptoms are not resolving with taking allergy medications around 2:30 PM today, instructed pt to be seen and further evaluated in nearest ER. Advised for pt not to drive self, have someone else drive.  Patient agreed with this plan and will proceed to [facility] ER now.    Kelsey Green RN  Clinical Triage/Primary Care  [facility]        Reason for Disposition   Widespread hives, itching or facial swelling and onset < 2 hours of exposure to high-risk allergen (e.g., sting, nuts, 1st dose of antibiotic)    Protocols used: [id number]    "

## 2024-07-12 ENCOUNTER — LAB (OUTPATIENT)
Dept: LAB | Facility: CLINIC | Age: 39
End: 2024-07-12
Payer: COMMERCIAL

## 2024-07-12 ENCOUNTER — VIRTUAL VISIT (OUTPATIENT)
Dept: URGENT CARE | Facility: CLINIC | Age: 39
End: 2024-07-12
Payer: COMMERCIAL

## 2024-07-12 DIAGNOSIS — B37.31 YEAST INFECTION OF THE VAGINA: Primary | ICD-10-CM

## 2024-07-12 DIAGNOSIS — R30.0 DYSURIA: ICD-10-CM

## 2024-07-12 LAB
ALBUMIN UR-MCNC: NEGATIVE MG/DL
APPEARANCE UR: CLEAR
BACTERIA #/AREA URNS HPF: ABNORMAL /HPF
BILIRUB UR QL STRIP: NEGATIVE
CLUE CELLS: ABNORMAL
COLOR UR AUTO: YELLOW
GLUCOSE UR STRIP-MCNC: NEGATIVE MG/DL
HGB UR QL STRIP: ABNORMAL
KETONES UR STRIP-MCNC: NEGATIVE MG/DL
LEUKOCYTE ESTERASE UR QL STRIP: NEGATIVE
NITRATE UR QL: NEGATIVE
PH UR STRIP: 6.5 [PH] (ref 5–7)
RBC #/AREA URNS AUTO: ABNORMAL /HPF
SP GR UR STRIP: 1.02 (ref 1–1.03)
SQUAMOUS #/AREA URNS AUTO: ABNORMAL /LPF
TRICHOMONAS, WET PREP: ABNORMAL
UROBILINOGEN UR STRIP-ACNC: 0.2 E.U./DL
WBC #/AREA URNS AUTO: ABNORMAL /HPF
WBC'S/HIGH POWER FIELD, WET PREP: ABNORMAL
YEAST #/AREA URNS HPF: ABNORMAL /HPF
YEAST, WET PREP: PRESENT

## 2024-07-12 PROCEDURE — 87210 SMEAR WET MOUNT SALINE/INK: CPT

## 2024-07-12 PROCEDURE — 99213 OFFICE O/P EST LOW 20 MIN: CPT | Mod: 95

## 2024-07-12 PROCEDURE — 81001 URINALYSIS AUTO W/SCOPE: CPT

## 2024-07-12 RX ORDER — FLUCONAZOLE 150 MG/1
150 TABLET ORAL
Qty: 2 TABLET | Refills: 0 | Status: SHIPPED | OUTPATIENT
Start: 2024-07-12

## 2024-07-12 NOTE — PROGRESS NOTES
Assessment & Plan     Yeast infection of the vagina  - fluconazole (DIFLUCAN) 150 MG tablet; Take 1 tablet (150 mg) by mouth every 3 days    Dysuria  - UA Macroscopic with reflex to Microscopic and Culture - Lab Collect; Future  - Wet prep - lab collect; Future    Wet prep with yeast, budding yeast seen in UA.  UA otherwise normal.  Will treat with diflucan, repeat in 3 days. Follow up if symptoms worsen or fail to improve.    Return in about 1 week (around 7/19/2024) for visit with primary care provider if not improving.     Keyona Sanders PA-C  [facility]    Subjective   Ely Lott is a 38 year old who presents for the following health issues    HPI    Lizbeth presents via video for evaluation of vaginal irritation.  Symptoms first began 3 days ago and did not seem to be getting better day or 2 but now are worse.  She has no discharge or discomfort in her vaginal area.  No pain with urination or urinary frequency.    Review of Systems   ROS negative except as stated above.      Objective    Physical Exam   GENERAL: Healthy, alert and no distress  EYES: Eyes grossly normal to inspection.  No discharge or erythema, or obvious scleral/conjunctival abnormalities.  HENT: Normal cephalic/atraumatic.  External ears, nose and mouth without ulcers or lesions.  No nasal drainage visible.  RESP: No audible cough wheeze or visible cyanosis.  No visible retractions or increased work of breathing.    SKIN: Visible skin clear. No significant rash, abnormal pigmentation or lesions.  NEURO: Cranial nerves grossly intact.  Mentation and speech appropriate for age.  PSYCH: Mentation appears normal, affect normal/bright, judgement and insight intact, normal speech and appearance well-groomed.    Type of service:  Video Visit  Video Start Time: 12:20PM  Video End Time: 12:27PM  Originating Location (pt. Location): Home  Distant Location (provider location):  [facility]-  offsite at home, [facility]  Platform used for Video Visit: [facility]    Results for orders placed or performed in visit on 07/12/24   UA Macroscopic with reflex to Microscopic and Culture - Lab Collect     Status: Abnormal    Specimen: Urine, Midstream   Result Value Ref Range    Color Urine Yellow Colorless, Straw, Light Yellow, Yellow    Appearance Urine Clear Clear    Glucose Urine Negative Negative mg/dL    Bilirubin Urine Negative Negative    Ketones Urine Negative Negative mg/dL    Specific Gravity Urine 1.025 1.003 - 1.035    Blood Urine Trace (A) Negative    pH Urine 6.5 5.0 - 7.0    Protein Albumin Urine Negative Negative mg/dL    Urobilinogen Urine 0.2 0.2, 1.0 E.U./dL    Nitrite Urine Negative Negative    Leukocyte Esterase Urine Negative Negative   UA Microscopic with Reflex to Culture     Status: Abnormal   Result Value Ref Range    Bacteria Urine Few (A) None Seen /HPF    RBC Urine 2-5 (A) 0-2 /HPF /HPF    WBC Urine None Seen 0-5 /HPF /HPF    Squamous Epithelials Urine Moderate (A) None Seen /LPF    Budding Yeast Urine Moderate (A) None Seen /HPF    Narrative    Urine Culture not indicated   Wet prep - lab collect     Status: Abnormal    Specimen: Vagina; Swab   Result Value Ref Range    Trichomonas Absent Absent    Yeast Present (A) Absent    Clue Cells Absent Absent    WBCs/high power field 2+ (A) None

## 2024-09-03 ENCOUNTER — TELEPHONE (OUTPATIENT)
Dept: FAMILY MEDICINE | Facility: CLINIC | Age: 39
End: 2024-09-03
Payer: COMMERCIAL

## 2024-09-03 NOTE — TELEPHONE ENCOUNTER
Patient Quality Outreach    Patient is due for the following:   Cervical Cancer Screening - PAP Needed  Urine Drug Screen      Topic Date Due    Flu Vaccine (1) 09/01/2024    COVID-19 Vaccine (3 - 2023-24 season) 09/01/2024       Next Steps:   Patient has upcoming appointment, these items will be addressed at that time.    Type of outreach:    Chart review performed, no outreach needed.      Questions for provider review:    None           Allyson Echeverria

## 2025-01-28 SDOH — HEALTH STABILITY: PHYSICAL HEALTH: ON AVERAGE, HOW MANY DAYS PER WEEK DO YOU ENGAGE IN MODERATE TO STRENUOUS EXERCISE (LIKE A BRISK WALK)?: 4 DAYS

## 2025-01-28 SDOH — HEALTH STABILITY: PHYSICAL HEALTH: ON AVERAGE, HOW MANY MINUTES DO YOU ENGAGE IN EXERCISE AT THIS LEVEL?: 20 MIN

## 2025-01-28 ASSESSMENT — SOCIAL DETERMINANTS OF HEALTH (SDOH): HOW OFTEN DO YOU GET TOGETHER WITH FRIENDS OR RELATIVES?: MORE THAN THREE TIMES A WEEK

## 2025-01-30 ENCOUNTER — OFFICE VISIT (OUTPATIENT)
Dept: FAMILY MEDICINE | Facility: CLINIC | Age: 40
End: 2025-01-30
Attending: NURSE PRACTITIONER
Payer: COMMERCIAL

## 2025-01-30 VITALS
WEIGHT: 150.2 LBS | BODY MASS INDEX: 25.02 KG/M2 | OXYGEN SATURATION: 99 % | DIASTOLIC BLOOD PRESSURE: 94 MMHG | HEART RATE: 102 BPM | SYSTOLIC BLOOD PRESSURE: 142 MMHG | TEMPERATURE: 98.4 F | RESPIRATION RATE: 18 BRPM | HEIGHT: 65 IN

## 2025-01-30 DIAGNOSIS — E78.1 HYPERTRIGLYCERIDEMIA: ICD-10-CM

## 2025-01-30 DIAGNOSIS — R03.0 ELEVATED BP WITHOUT DIAGNOSIS OF HYPERTENSION: ICD-10-CM

## 2025-01-30 DIAGNOSIS — Z00.00 ROUTINE HISTORY AND PHYSICAL EXAMINATION OF ADULT: Primary | ICD-10-CM

## 2025-01-30 DIAGNOSIS — R82.90 ABNORMAL URINE ODOR: ICD-10-CM

## 2025-01-30 DIAGNOSIS — Z12.4 CERVICAL CANCER SCREENING: ICD-10-CM

## 2025-01-30 LAB
ALBUMIN UR-MCNC: NEGATIVE MG/DL
APPEARANCE UR: CLEAR
BACTERIA #/AREA URNS HPF: ABNORMAL /HPF
BILIRUB UR QL STRIP: NEGATIVE
CLUE CELLS: ABNORMAL
COLOR UR AUTO: YELLOW
GLUCOSE UR STRIP-MCNC: NEGATIVE MG/DL
HGB UR QL STRIP: ABNORMAL
KETONES UR STRIP-MCNC: NEGATIVE MG/DL
LEUKOCYTE ESTERASE UR QL STRIP: NEGATIVE
NITRATE UR QL: NEGATIVE
PH UR STRIP: 6 [PH] (ref 5–7)
RBC #/AREA URNS AUTO: ABNORMAL /HPF
SP GR UR STRIP: 1.01 (ref 1–1.03)
SQUAMOUS #/AREA URNS AUTO: ABNORMAL /LPF
TRICHOMONAS, WET PREP: ABNORMAL
UROBILINOGEN UR STRIP-ACNC: 0.2 E.U./DL
WBC #/AREA URNS AUTO: ABNORMAL /HPF
WBC'S/HIGH POWER FIELD, WET PREP: ABNORMAL
YEAST, WET PREP: ABNORMAL

## 2025-01-30 PROCEDURE — 99213 OFFICE O/P EST LOW 20 MIN: CPT | Mod: 25 | Performed by: NURSE PRACTITIONER

## 2025-01-30 PROCEDURE — 1126F AMNT PAIN NOTED NONE PRSNT: CPT | Performed by: NURSE PRACTITIONER

## 2025-01-30 PROCEDURE — G2211 COMPLEX E/M VISIT ADD ON: HCPCS | Performed by: NURSE PRACTITIONER

## 2025-01-30 PROCEDURE — 80061 LIPID PANEL: CPT | Performed by: NURSE PRACTITIONER

## 2025-01-30 PROCEDURE — 36415 COLL VENOUS BLD VENIPUNCTURE: CPT | Performed by: NURSE PRACTITIONER

## 2025-01-30 PROCEDURE — 3080F DIAST BP >= 90 MM HG: CPT | Performed by: NURSE PRACTITIONER

## 2025-01-30 PROCEDURE — 81001 URINALYSIS AUTO W/SCOPE: CPT | Performed by: NURSE PRACTITIONER

## 2025-01-30 PROCEDURE — 80048 BASIC METABOLIC PNL TOTAL CA: CPT | Performed by: NURSE PRACTITIONER

## 2025-01-30 PROCEDURE — G0145 SCR C/V CYTO,THINLAYER,RESCR: HCPCS | Performed by: NURSE PRACTITIONER

## 2025-01-30 PROCEDURE — 3077F SYST BP >= 140 MM HG: CPT | Performed by: NURSE PRACTITIONER

## 2025-01-30 PROCEDURE — 87624 HPV HI-RISK TYP POOLED RSLT: CPT | Performed by: NURSE PRACTITIONER

## 2025-01-30 PROCEDURE — 87210 SMEAR WET MOUNT SALINE/INK: CPT | Performed by: NURSE PRACTITIONER

## 2025-01-30 PROCEDURE — 99395 PREV VISIT EST AGE 18-39: CPT | Mod: 25 | Performed by: NURSE PRACTITIONER

## 2025-01-30 RX ORDER — LACTOBACILLUS RHAMNOSUS GG 10B CELL
1 CAPSULE ORAL 2 TIMES DAILY
COMMUNITY

## 2025-01-30 ASSESSMENT — PAIN SCALES - GENERAL: PAINLEVEL_OUTOF10: NO PAIN (0)

## 2025-01-30 NOTE — PROGRESS NOTES
"Preventive Care Visit  [facility]  MALIK Sheldon CNP, Family Medicine  Jan 30, 2025      Assessment & Plan     Routine history and physical examination of adult  Appropriate preventive services were addressed with this patient via screening, questionnaire, or discussion as appropriate for fall prevention, nutrition, physical activity, tobacco-use cessation, social engagement, weight loss and cognition.  Checklist reviewing preventive services available has been given to the patient.      - REVIEW OF HEALTH MAINTENANCE PROTOCOL ORDERS    Cervical cancer screening    - HPV and Gynecologic Cytology Panel - Recommended Age 30 - 65 Years  - Gynecologic Cytology (PAP)    Elevated BP without diagnosis of hypertension  Checkign BMP, she endorses anxiety prior to paps  - Basic metabolic panel  (Ca, Cl, CO2, Creat, Gluc, K, Na, BUN)  - Basic metabolic panel  (Ca, Cl, CO2, Creat, Gluc, K, Na, BUN)    Hypertriglyceridemia    - Lipid panel reflex to direct LDL Fasting  - Lipid panel reflex to direct LDL Fasting    Abnormal urine odor    - UA Macroscopic with reflex to Microscopic and Culture - Lab Collect  - Wet prep - Clinic Collect  - UA Macroscopic with reflex to Microscopic and Culture - Lab Collect  - UA Microscopic with Reflex to Culture          BMI  Estimated body mass index is 25.12 kg/m  as calculated from the following:    Height as of this encounter: 1.647 m (5' 4.84\").    Weight as of this encounter: 68.1 kg (150 lb 3.2 oz).       Counseling  Appropriate preventive services were addressed with this patient via screening, questionnaire, or discussion as appropriate for fall prevention, nutrition, physical activity, Tobacco-use cessation, social engagement, weight loss and cognition.  Checklist reviewing preventive services available has been given to the patient.      Work on weight loss  Regular exercise  See Patient Instructions    Dalia   Lizbeth is a 39 year old, " presenting for the following:  Physical        1/30/2025    12:47 PM   Additional Questions   Roomed by Courtney MARTIN   Accompanied by Self         1/30/2025    12:47 PM   Patient Reported Additional Medications   Patient reports taking the following new medications Probiotic          HPI      Hyperlipidemia Follow-Up    Are you regularly taking any medication or supplement to lower your cholesterol?   No  Are you having muscle aches or other side effects that you think could be caused by your cholesterol lowering medication?  No    She also complains of strong urine odor, no dysuria, urgency, or  frequencyk, no abdominal, flank, or back pain, no vaginal discomfort, discharge or odor.  Health Care Directive  Patient does not have a Health Care Directive: Discussed advance care planning with patient; however, patient declined at this time.      1/28/2025   General Health   How would you rate your overall physical health? (!) FAIR   Feel stress (tense, anxious, or unable to sleep) To some extent   (!) STRESS CONCERN      1/28/2025   Nutrition   Three or more servings of calcium each day? Yes   Diet: Regular (no restrictions)    Breakfast skipped   How many servings of fruit and vegetables per day? (!) 2-3   How many sweetened beverages each day? (!) 2       Multiple values from one day are sorted in reverse-chronological order         1/28/2025   Exercise   Days per week of moderate/strenous exercise 4 days   Average minutes spent exercising at this level 20 min         1/28/2025   Social Factors   Frequency of gathering with friends or relatives More than three times a week   Worry food won't last until get money to buy more No   Food not last or not have enough money for food? No   Do you have housing? (Housing is defined as stable permanent housing and does not include staying ouside in a car, in a tent, in an abandoned building, in an overnight shelter, or couch-surfing.) Yes   Are you worried about losing your housing?  No   Lack of transportation? No   Unable to get utilities (heat,electricity)? No         1/28/2025   Dental   Dentist two times every year? Yes         1/28/2025   TB Screening   Were you born outside of the [place]? No         Today's PHQ-2 Score:       1/30/2025    12:36 PM   PHQ-2 ( 1999 [employer])   Q1: Little interest or pleasure in doing things 0   Q2: Feeling down, depressed or hopeless 1   PHQ-2 Score 1    Q1: Little interest or pleasure in doing things Not at all   Q2: Feeling down, depressed or hopeless Several days   PHQ-2 Score 1       Patient-reported           1/28/2025   Substance Use   Alcohol more than 3/day or more than 7/wk No   Do you use any other substances recreationally? No     Social History     Tobacco Use    Smoking status: Never     Passive exposure: Never    Smokeless tobacco: Never   Vaping Use    Vaping status: Never Used   Substance Use Topics    Alcohol use: Yes    Drug use: No             1/28/2025   Breast Cancer Screening   Family history of breast, colon, or ovarian cancer? No / Unknown      Mammogram Screening - Patient under 40 years of age: Routine Mammogram Screening not recommended.         1/28/2025   STI Screening   New sexual partner(s) since last STI/HIV test? No     History of abnormal Pap smear: No - age 30-64 HPV with reflex Pap every 5 years recommended        Latest Ref Rng & Units 8/7/2019    10:38 AM 8/7/2019    10:37 AM 4/18/2016     5:39 PM   PAP / HPV   PAP (Historical)  NIL      HPV 16 DNA NEG^Negative  Negative  Negative    HPV 18 DNA NEG^Negative  Negative  Negative    Other HR HPV NEG^Negative  Negative  Negative            1/28/2025   Contraception/Family Planning   Questions about contraception or family planning No        Reviewed and updated as needed this visit by Provider                    Lab work is in process  Labs reviewed in EPIC  BP Readings from Last 3 Encounters:   02/21/25 (!) 155/94   01/30/25 (!) 142/94   01/10/25 (!) 138/96    Wt Readings from  "Last 3 Encounters:   02/21/25 69.3 kg (152 lb 12.8 oz)   01/30/25 68.1 kg (150 lb 3.2 oz)   01/10/25 70.1 kg (154 lb 8 oz)                      Review of Systems  Constitutional, HEENT, cardiovascular, pulmonary, gi and gu systems are negative, except as otherwise noted.     Objective    Exam  BP (!) 149/104 (BP Location: Left arm, Patient Position: Sitting, Cuff Size: Adult Regular)   Pulse 102   Temp 98.4  F (36.9  C) (Tympanic)   Resp 18   Ht 1.647 m (5' 4.84\")   Wt 68.1 kg (150 lb 3.2 oz)   LMP 01/15/2025 (Approximate)   SpO2 99%   BMI 25.12 kg/m     Estimated body mass index is 25.12 kg/m  as calculated from the following:    Height as of this encounter: 1.647 m (5' 4.84\").    Weight as of this encounter: 68.1 kg (150 lb 3.2 oz).    Physical Exam  GENERAL: alert and no distress  EYES: Eyes grossly normal to inspection, PERRL and conjunctivae and sclerae normal  HENT: ear canals and TM's normal, nose and mouth without ulcers or lesions  NECK: no adenopathy, no asymmetry, masses, or scars  RESP: lungs clear to auscultation - no rales, rhonchi or wheezes  CV: regular rate and rhythm, normal S1 S2, no S3 or S4, no murmur, click or rub, no peripheral edema  ABDOMEN: soft, nontender, no hepatosplenomegaly, no masses and bowel sounds normal   (female): normal female external genitalia, normal urethral meatus, normal vaginal mucosa, pap and wet prep obtained  MS: no gross musculoskeletal defects noted, no edema  SKIN: no suspicious lesions or rashes  NEURO: Normal strength and tone, mentation intact and speech normal  BACK: no CVA tenderness, no paralumbar tenderness  PSYCH: mentation appears normal, affect normal/bright  LYMPH: normal ant/post cervical, supraclavicular nodes  inguinal: no adenopathy        Signed Electronically by: MALIK Sheldon CNP    "

## 2025-01-31 LAB
ANION GAP SERPL CALCULATED.3IONS-SCNC: 12 MMOL/L (ref 7–15)
BUN SERPL-MCNC: 8.8 MG/DL (ref 6–20)
CALCIUM SERPL-MCNC: 9.2 MG/DL (ref 8.8–10.4)
CHLORIDE SERPL-SCNC: 104 MMOL/L (ref 98–107)
CHOLEST SERPL-MCNC: 165 MG/DL
CREAT SERPL-MCNC: 0.93 MG/DL (ref 0.51–0.95)
EGFRCR SERPLBLD CKD-EPI 2021: 80 ML/MIN/1.73M2
FASTING STATUS PATIENT QL REPORTED: NO
FASTING STATUS PATIENT QL REPORTED: NO
GLUCOSE SERPL-MCNC: 87 MG/DL (ref 70–99)
HCO3 SERPL-SCNC: 22 MMOL/L (ref 22–29)
HDLC SERPL-MCNC: 66 MG/DL
HPV HR 12 DNA CVX QL NAA+PROBE: NEGATIVE
HPV16 DNA CVX QL NAA+PROBE: NEGATIVE
HPV18 DNA CVX QL NAA+PROBE: NEGATIVE
HUMAN PAPILLOMA VIRUS FINAL DIAGNOSIS: NORMAL
LDLC SERPL CALC-MCNC: 82 MG/DL
NONHDLC SERPL-MCNC: 99 MG/DL
POTASSIUM SERPL-SCNC: 4.4 MMOL/L (ref 3.4–5.3)
SODIUM SERPL-SCNC: 138 MMOL/L (ref 135–145)
TRIGL SERPL-MCNC: 86 MG/DL

## 2025-02-04 LAB
BKR AP ASSOCIATED HPV REPORT: NORMAL
BKR LAB AP GYN ADEQUACY: NORMAL
BKR LAB AP GYN INTERPRETATION: NORMAL
BKR LAB AP LMP: NORMAL
BKR LAB AP PREVIOUS ABNORMAL: NORMAL
PATH REPORT.COMMENTS IMP SPEC: NORMAL
PATH REPORT.COMMENTS IMP SPEC: NORMAL
PATH REPORT.RELEVANT HX SPEC: NORMAL